# Patient Record
Sex: FEMALE | Race: WHITE | Employment: OTHER | URBAN - METROPOLITAN AREA
[De-identification: names, ages, dates, MRNs, and addresses within clinical notes are randomized per-mention and may not be internally consistent; named-entity substitution may affect disease eponyms.]

---

## 2017-03-07 ENCOUNTER — OFFICE VISIT (OUTPATIENT)
Dept: FAMILY MEDICINE CLINIC | Age: 77
End: 2017-03-07

## 2017-03-07 VITALS
HEART RATE: 81 BPM | OXYGEN SATURATION: 95 % | SYSTOLIC BLOOD PRESSURE: 132 MMHG | HEIGHT: 65 IN | BODY MASS INDEX: 29.16 KG/M2 | WEIGHT: 175 LBS | DIASTOLIC BLOOD PRESSURE: 82 MMHG

## 2017-03-07 DIAGNOSIS — E78.2 MIXED HYPERLIPIDEMIA: ICD-10-CM

## 2017-03-07 DIAGNOSIS — I10 ESSENTIAL HYPERTENSION: ICD-10-CM

## 2017-03-07 DIAGNOSIS — Z00.00 PHYSICAL EXAM, ROUTINE: Primary | ICD-10-CM

## 2017-03-07 DIAGNOSIS — Z71.2 ENCOUNTER TO DISCUSS TEST RESULTS: ICD-10-CM

## 2017-03-07 DIAGNOSIS — E03.9 HYPOTHYROIDISM, UNSPECIFIED TYPE: ICD-10-CM

## 2017-03-07 DIAGNOSIS — L98.9 SKIN ABNORMALITIES: ICD-10-CM

## 2017-03-07 PROCEDURE — G8399 PT W/DXA RESULTS DOCUMENT: HCPCS | Performed by: FAMILY MEDICINE

## 2017-03-07 PROCEDURE — 3017F COLORECTAL CA SCREEN DOC REV: CPT | Performed by: FAMILY MEDICINE

## 2017-03-07 PROCEDURE — G8598 ASA/ANTIPLAT THER USED: HCPCS | Performed by: FAMILY MEDICINE

## 2017-03-07 PROCEDURE — 4040F PNEUMOC VAC/ADMIN/RCVD: CPT | Performed by: FAMILY MEDICINE

## 2017-03-07 PROCEDURE — 1090F PRES/ABSN URINE INCON ASSESS: CPT | Performed by: FAMILY MEDICINE

## 2017-03-07 PROCEDURE — 99214 OFFICE O/P EST MOD 30 MIN: CPT | Performed by: FAMILY MEDICINE

## 2017-03-07 PROCEDURE — 1036F TOBACCO NON-USER: CPT | Performed by: FAMILY MEDICINE

## 2017-03-07 PROCEDURE — G8420 CALC BMI NORM PARAMETERS: HCPCS | Performed by: FAMILY MEDICINE

## 2017-03-07 PROCEDURE — 3014F SCREEN MAMMO DOC REV: CPT | Performed by: FAMILY MEDICINE

## 2017-03-07 PROCEDURE — 1123F ACP DISCUSS/DSCN MKR DOCD: CPT | Performed by: FAMILY MEDICINE

## 2017-03-07 PROCEDURE — G8427 DOCREV CUR MEDS BY ELIG CLIN: HCPCS | Performed by: FAMILY MEDICINE

## 2017-03-07 PROCEDURE — G8484 FLU IMMUNIZE NO ADMIN: HCPCS | Performed by: FAMILY MEDICINE

## 2017-03-07 RX ORDER — CHOLECALCIFEROL (VITAMIN D3) 125 MCG
1 CAPSULE ORAL DAILY
COMMUNITY

## 2017-07-03 RX ORDER — PRAVASTATIN SODIUM 40 MG
40 TABLET ORAL NIGHTLY
Qty: 90 TABLET | Refills: 3 | Status: SHIPPED | OUTPATIENT
Start: 2017-07-03 | End: 2017-07-07 | Stop reason: ALTCHOICE

## 2017-07-07 RX ORDER — ATORVASTATIN CALCIUM 10 MG/1
10 TABLET, FILM COATED ORAL DAILY
Qty: 90 TABLET | Refills: 3 | Status: SHIPPED | OUTPATIENT
Start: 2017-07-07 | End: 2018-05-06 | Stop reason: SDUPTHER

## 2017-11-17 ENCOUNTER — OFFICE VISIT (OUTPATIENT)
Dept: FAMILY MEDICINE CLINIC | Age: 77
End: 2017-11-17

## 2017-11-17 VITALS
HEART RATE: 99 BPM | OXYGEN SATURATION: 96 % | SYSTOLIC BLOOD PRESSURE: 132 MMHG | WEIGHT: 176.6 LBS | TEMPERATURE: 98 F | HEIGHT: 65 IN | BODY MASS INDEX: 29.42 KG/M2 | DIASTOLIC BLOOD PRESSURE: 80 MMHG

## 2017-11-17 DIAGNOSIS — J20.9 BRONCHITIS WITH BRONCHOSPASM: ICD-10-CM

## 2017-11-17 DIAGNOSIS — R06.02 SOB (SHORTNESS OF BREATH) ON EXERTION: Primary | ICD-10-CM

## 2017-11-17 PROCEDURE — 1090F PRES/ABSN URINE INCON ASSESS: CPT | Performed by: FAMILY MEDICINE

## 2017-11-17 PROCEDURE — 4040F PNEUMOC VAC/ADMIN/RCVD: CPT | Performed by: FAMILY MEDICINE

## 2017-11-17 PROCEDURE — 1036F TOBACCO NON-USER: CPT | Performed by: FAMILY MEDICINE

## 2017-11-17 PROCEDURE — 1123F ACP DISCUSS/DSCN MKR DOCD: CPT | Performed by: FAMILY MEDICINE

## 2017-11-17 PROCEDURE — 99213 OFFICE O/P EST LOW 20 MIN: CPT | Performed by: FAMILY MEDICINE

## 2017-11-17 PROCEDURE — G8484 FLU IMMUNIZE NO ADMIN: HCPCS | Performed by: FAMILY MEDICINE

## 2017-11-17 PROCEDURE — 3017F COLORECTAL CA SCREEN DOC REV: CPT | Performed by: FAMILY MEDICINE

## 2017-11-17 PROCEDURE — G8427 DOCREV CUR MEDS BY ELIG CLIN: HCPCS | Performed by: FAMILY MEDICINE

## 2017-11-17 PROCEDURE — G8399 PT W/DXA RESULTS DOCUMENT: HCPCS | Performed by: FAMILY MEDICINE

## 2017-11-17 PROCEDURE — G8419 CALC BMI OUT NRM PARAM NOF/U: HCPCS | Performed by: FAMILY MEDICINE

## 2017-11-17 RX ORDER — FLUTICASONE FUROATE AND VILANTEROL 100; 25 UG/1; UG/1
1 POWDER RESPIRATORY (INHALATION) DAILY
Qty: 1 EACH | Refills: 0 | COMMUNITY
Start: 2017-11-17 | End: 2020-02-24

## 2017-11-17 RX ORDER — DOXYCYCLINE HYCLATE 100 MG
100 TABLET ORAL 2 TIMES DAILY
Qty: 20 TABLET | Refills: 0 | Status: SHIPPED | OUTPATIENT
Start: 2017-11-17 | End: 2017-11-27

## 2017-11-17 ASSESSMENT — PATIENT HEALTH QUESTIONNAIRE - PHQ9
2. FEELING DOWN, DEPRESSED OR HOPELESS: 0
SUM OF ALL RESPONSES TO PHQ9 QUESTIONS 1 & 2: 0
1. LITTLE INTEREST OR PLEASURE IN DOING THINGS: 0
SUM OF ALL RESPONSES TO PHQ QUESTIONS 1-9: 0

## 2017-11-27 RX ORDER — LOSARTAN POTASSIUM 50 MG/1
50 TABLET ORAL DAILY
Qty: 90 TABLET | Refills: 1 | Status: SHIPPED | OUTPATIENT
Start: 2017-11-27 | End: 2018-05-06 | Stop reason: SDUPTHER

## 2017-11-27 RX ORDER — LEVOTHYROXINE SODIUM 0.07 MG/1
75 TABLET ORAL
Qty: 90 TABLET | Refills: 0 | Status: SHIPPED | OUTPATIENT
Start: 2017-11-27 | End: 2018-01-21 | Stop reason: SDUPTHER

## 2017-11-27 NOTE — TELEPHONE ENCOUNTER
Last OV  11/17/2017 SOB on exertion   Metformin  Last Refill 1/16/2017 #180 2 refills  Lab Results   Component Value Date    LABA1C 6.0 (H) 11/21/2016     Lab Results   Component Value Date    .8 12/17/2014   `    Levothyroxine  Last refill 11/26/2016 # 90 3 refils  Lab Results   Component Value Date    TSH 1.94 11/21/2016         Losartan  Last refill  11/26/2016 #90 3 refills   Lab Results   Component Value Date     11/21/2016    K 4.6 11/21/2016     11/21/2016    CO2 27 11/21/2016    BUN 24 11/21/2016    CREATININE 0.90 11/21/2016    GLUCOSE 104 (H) 11/21/2016    CALCIUM 10.1 11/21/2016    PROT 7.0 11/21/2016    LABALBU 4.4 11/21/2016    BILITOT 0.5 11/21/2016    ALKPHOS 97 11/21/2016    AST 17 11/21/2016    ALT 18 11/21/2016    LABGLOM 63 11/21/2016    GFRAA 73 11/21/2016    AGRATIO 1.7 11/21/2016    GLOB 2.6 11/21/2016

## 2017-11-27 NOTE — TELEPHONE ENCOUNTER
From: Ami Lehman  Sent: 11/26/2017 1:17 PM EST  Subject: Medication Renewal Request    Jennifer Davies. Dayana Urbina would like a refill of the following medications:  losartan (COZAAR) 50 MG tablet [Sarita Neal MD]  levothyroxine (SYNTHROID) 75 MCG tablet [Sarita Neal MD]  metFORMIN (GLUCOPHAGE) 500 MG tablet Johanna Cruz MD]    Preferred pharmacy: [address] [place] [address] [phone]    Comment:  Please request refill renewals for above Rxs (all 90 day) from [facility] asap.  Thanks

## 2018-01-03 ENCOUNTER — OFFICE VISIT (OUTPATIENT)
Dept: FAMILY MEDICINE CLINIC | Age: 78
End: 2018-01-03

## 2018-01-03 VITALS
SYSTOLIC BLOOD PRESSURE: 148 MMHG | WEIGHT: 177.2 LBS | HEIGHT: 65 IN | DIASTOLIC BLOOD PRESSURE: 92 MMHG | OXYGEN SATURATION: 98 % | BODY MASS INDEX: 29.52 KG/M2 | HEART RATE: 80 BPM

## 2018-01-03 DIAGNOSIS — I10 ESSENTIAL HYPERTENSION: Primary | ICD-10-CM

## 2018-01-03 DIAGNOSIS — E78.49 OTHER HYPERLIPIDEMIA: ICD-10-CM

## 2018-01-03 DIAGNOSIS — E03.9 ACQUIRED HYPOTHYROIDISM: ICD-10-CM

## 2018-01-03 DIAGNOSIS — J20.9 BRONCHITIS WITH BRONCHOSPASM: ICD-10-CM

## 2018-01-03 DIAGNOSIS — R73.03 PREDIABETES: ICD-10-CM

## 2018-01-03 PROCEDURE — 3017F COLORECTAL CA SCREEN DOC REV: CPT | Performed by: FAMILY MEDICINE

## 2018-01-03 PROCEDURE — G8484 FLU IMMUNIZE NO ADMIN: HCPCS | Performed by: FAMILY MEDICINE

## 2018-01-03 PROCEDURE — G8419 CALC BMI OUT NRM PARAM NOF/U: HCPCS | Performed by: FAMILY MEDICINE

## 2018-01-03 PROCEDURE — 99214 OFFICE O/P EST MOD 30 MIN: CPT | Performed by: FAMILY MEDICINE

## 2018-01-03 PROCEDURE — 4040F PNEUMOC VAC/ADMIN/RCVD: CPT | Performed by: FAMILY MEDICINE

## 2018-01-03 PROCEDURE — 1090F PRES/ABSN URINE INCON ASSESS: CPT | Performed by: FAMILY MEDICINE

## 2018-01-03 PROCEDURE — G8427 DOCREV CUR MEDS BY ELIG CLIN: HCPCS | Performed by: FAMILY MEDICINE

## 2018-01-03 PROCEDURE — 1036F TOBACCO NON-USER: CPT | Performed by: FAMILY MEDICINE

## 2018-01-03 PROCEDURE — G8399 PT W/DXA RESULTS DOCUMENT: HCPCS | Performed by: FAMILY MEDICINE

## 2018-01-03 PROCEDURE — 1123F ACP DISCUSS/DSCN MKR DOCD: CPT | Performed by: FAMILY MEDICINE

## 2018-01-03 NOTE — PROGRESS NOTES
po daily, will recheck BP on future visit  - Labs to be drawn on future visit  - CBC; Future  - Comprehensive Metabolic Panel; Future    2. Acquired hypothyroidism  - Continue Synthroid 75 mcg PO daily  - Labs to be drawn on future visit  - T4, Free; Future  - TSH without Reflex; Future    3. Other hyperlipidemia  - Continue Lipitor 10 mg po daily  - Will draw lipid panel on future visit    4. Prediabetes  - Continue metformin 500 mg po daily  - Hemoglobin A1C; Future  - Lipid Panel; Future    5. Bronchitis with bronchospasm  - Likely viral etiology  - Mild severity lack of symptoms such as fever or drainage to indicate bacterial infection  - CBC; Future  - mometasone-formoterol (DULERA) 100-5 MCG/ACT inhaler; Inhale 2 puffs into the lungs 2 times daily  Dispense: 1 Inhaler; Refill: 0      25 minutes spent with the pt face-to-face,  >50% spent reviewing test results and discussing plan of care and counseled on healthy diet, regular exercise, fasting blood work when she is feeling better and routine care in a month or 2.

## 2018-01-08 RX ORDER — PREDNISONE 20 MG/1
20 TABLET ORAL DAILY
Qty: 5 TABLET | Refills: 0 | Status: SHIPPED | OUTPATIENT
Start: 2018-01-08 | End: 2018-01-13

## 2018-01-22 RX ORDER — LEVOTHYROXINE SODIUM 0.07 MG/1
TABLET ORAL
Qty: 90 TABLET | Refills: 1 | Status: SHIPPED | OUTPATIENT
Start: 2018-01-22 | End: 2018-07-15 | Stop reason: SDUPTHER

## 2018-01-25 LAB
A/G RATIO: 1.6 (CALC) (ref 1–2.5)
ALBUMIN SERPL-MCNC: 4 G/DL (ref 3.6–5.1)
ALP BLD-CCNC: 100 U/L (ref 33–130)
ALT SERPL-CCNC: 13 U/L (ref 6–29)
AST SERPL-CCNC: 15 U/L (ref 10–35)
ATYPICAL LYMPHOCYTE RELATIVE PERCENT: NORMAL % (ref 0–10)
BAND NEUTROPHILS: NORMAL %
BANDED NEUTROPHILS ABSOLUTE COUNT: NORMAL CELLS/UL (ref 0–750)
BASOPHILS ABSOLUTE: 19 CELLS/UL (ref 0–200)
BASOPHILS RELATIVE PERCENT: 0.3 %
BILIRUB SERPL-MCNC: 0.4 MG/DL (ref 0.2–1.2)
BLASTS ABSOLUTE COUNT: NORMAL CELLS/UL
BLASTS RELATIVE PERCENT: NORMAL %
BUN / CREAT RATIO: 29 (CALC) (ref 6–22)
BUN BLDV-MCNC: 27 MG/DL (ref 7–25)
CALCIUM SERPL-MCNC: 9.7 MG/DL (ref 8.6–10.4)
CHLORIDE BLD-SCNC: 106 MMOL/L (ref 98–110)
CHOLESTEROL, TOTAL: 167 MG/DL
CHOLESTEROL/HDL RATIO: 2.6 (CALC)
CHOLESTEROL: 103 MG/DL (CALC)
CO2: 27 MMOL/L (ref 20–31)
COMMENT: NORMAL
CREAT SERPL-MCNC: 0.92 MG/DL (ref 0.6–0.93)
EOSINOPHILS ABSOLUTE: 130 CELLS/UL (ref 15–500)
EOSINOPHILS RELATIVE PERCENT: 2.1 %
GFR AFRICAN AMERICAN: 71 ML/MIN/1.73M2
GFR SERPL CREATININE-BSD FRML MDRD: 61 ML/MIN/1.73M2
GLOBULIN: 2.5 G/DL (CALC) (ref 1.9–3.7)
GLUCOSE BLD-MCNC: 87 MG/DL (ref 65–99)
HBA1C MFR BLD: 5.7 % OF TOTAL HGB
HCT VFR BLD CALC: 38.4 % (ref 35–45)
HDLC SERPL-MCNC: 64 MG/DL
HEMOGLOBIN: 13.1 G/DL (ref 11.7–15.5)
LDL CHOLESTEROL CALCULATED: 83 MG/DL (CALC)
LYMPHOCYTES ABSOLUTE: 1941 CELLS/UL (ref 850–3900)
LYMPHOCYTES RELATIVE PERCENT: 31.3 %
MCH RBC QN AUTO: 30.3 PG (ref 27–33)
MCHC RBC AUTO-ENTMCNC: 34.1 G/DL (ref 32–36)
MCV RBC AUTO: 88.7 FL (ref 80–100)
METAMYELOCYTES ABSOLUTE COUNT: NORMAL CELLS/UL
METAMYELOCYTES RELATIVE PERCENT: NORMAL %
MONOCYTES ABSOLUTE: 558 CELLS/UL (ref 200–950)
MONOCYTES RELATIVE PERCENT: 9 %
MYELOCYTE PERCENT: NORMAL %
MYELOCYTES ABSOLUTE COUNT: NORMAL CELLS/UL
NEUTROPHILS ABSOLUTE: 3553 CELLS/UL (ref 1500–7800)
NUCLEATED RED BLOOD CELLS: NORMAL /100 WBC
NUCLEATED RED BLOOD CELLS: NORMAL CELLS/UL
PDW BLD-RTO: 13.8 % (ref 11–15)
PLATELET # BLD: 287 THOUSAND/UL (ref 140–400)
PMV BLD AUTO: 9.1 FL (ref 7.5–12.5)
POTASSIUM SERPL-SCNC: 4.4 MMOL/L (ref 3.5–5.3)
PROMYELOCYTES ABSOLUTE COUNT: NORMAL CELLS/UL
PROMYELOCYTES PERCENT: NORMAL %
RBC # BLD: 4.33 MILLION/UL (ref 3.8–5.1)
SEGMENTED NEUTROPHILS RELATIVE PERCENT: 57.3 %
SODIUM BLD-SCNC: 140 MMOL/L (ref 135–146)
T4 FREE: 1.3 NG/DL (ref 0.8–1.8)
TOTAL PROTEIN: 6.5 G/DL (ref 6.1–8.1)
TRIGL SERPL-MCNC: 100 MG/DL
TSH ULTRASENSITIVE: 3.18 MIU/L (ref 0.4–4.5)
WBC # BLD: 6.2 THOUSAND/UL (ref 3.8–10.8)

## 2018-05-07 RX ORDER — LOSARTAN POTASSIUM 50 MG/1
50 TABLET ORAL DAILY
Qty: 90 TABLET | Refills: 3 | Status: SHIPPED | OUTPATIENT
Start: 2018-05-07 | End: 2019-02-18 | Stop reason: SDUPTHER

## 2018-05-07 RX ORDER — ATORVASTATIN CALCIUM 10 MG/1
10 TABLET, FILM COATED ORAL DAILY
Qty: 90 TABLET | Refills: 3 | Status: SHIPPED | OUTPATIENT
Start: 2018-05-07 | End: 2019-02-04 | Stop reason: SDUPTHER

## 2018-07-16 RX ORDER — LEVOTHYROXINE SODIUM 0.07 MG/1
TABLET ORAL
Qty: 90 TABLET | Refills: 2 | Status: SHIPPED | OUTPATIENT
Start: 2018-07-16 | End: 2019-02-04 | Stop reason: SDUPTHER

## 2018-10-17 ENCOUNTER — HOSPITAL ENCOUNTER (OUTPATIENT)
Dept: WOMENS IMAGING | Age: 78
Discharge: HOME | End: 2018-10-17
Payer: MEDICARE

## 2018-10-17 DIAGNOSIS — Z12.31 ENCOUNTER FOR SCREENING MAMMOGRAM FOR BREAST CANCER: ICD-10-CM

## 2018-10-17 PROCEDURE — 77063 BREAST TOMOSYNTHESIS BI: CPT

## 2019-02-04 DIAGNOSIS — E78.2 MIXED HYPERLIPIDEMIA: ICD-10-CM

## 2019-02-04 DIAGNOSIS — R73.03 PREDIABETES: Primary | ICD-10-CM

## 2019-02-04 DIAGNOSIS — E03.9 ACQUIRED HYPOTHYROIDISM: ICD-10-CM

## 2019-02-13 LAB
A/G RATIO: 1.8 (CALC) (ref 1–2.5)
ALBUMIN SERPL-MCNC: 4.4 G/DL (ref 3.6–5.1)
ALP BLD-CCNC: 98 U/L (ref 33–130)
ALT SERPL-CCNC: 15 U/L (ref 6–29)
AST SERPL-CCNC: 16 U/L (ref 10–35)
BILIRUB SERPL-MCNC: 0.5 MG/DL (ref 0.2–1.2)
BUN / CREAT RATIO: NORMAL (CALC) (ref 6–22)
BUN BLDV-MCNC: 24 MG/DL (ref 7–25)
CALCIUM SERPL-MCNC: 10 MG/DL (ref 8.6–10.4)
CHLORIDE BLD-SCNC: 105 MMOL/L (ref 98–110)
CHOLESTEROL, TOTAL: 176 MG/DL
CHOLESTEROL/HDL RATIO: 2.6 (CALC)
CHOLESTEROL: 108 MG/DL (CALC)
CO2: 28 MMOL/L (ref 20–32)
CREAT SERPL-MCNC: 0.84 MG/DL (ref 0.6–0.93)
GFR AFRICAN AMERICAN: 78 ML/MIN/1.73M2
GFR, ESTIMATED: 68 ML/MIN/1.73M2
GLOBULIN: 2.5 G/DL (CALC) (ref 1.9–3.7)
GLUCOSE BLD-MCNC: 91 MG/DL (ref 65–99)
HBA1C MFR BLD: 6.2 % OF TOTAL HGB
HDLC SERPL-MCNC: 68 MG/DL
LDL CHOLESTEROL CALCULATED: 89 MG/DL (CALC)
POTASSIUM SERPL-SCNC: 4.5 MMOL/L (ref 3.5–5.3)
SODIUM BLD-SCNC: 142 MMOL/L (ref 135–146)
T4 FREE: 1.2 NG/DL (ref 0.8–1.8)
TOTAL PROTEIN: 6.9 G/DL (ref 6.1–8.1)
TRIGL SERPL-MCNC: 95 MG/DL
TSH ULTRASENSITIVE: 2.47 MIU/L (ref 0.4–4.5)

## 2019-02-18 ENCOUNTER — OFFICE VISIT (OUTPATIENT)
Dept: FAMILY MEDICINE CLINIC | Age: 79
End: 2019-02-18
Payer: MEDICARE

## 2019-02-18 VITALS
WEIGHT: 187 LBS | SYSTOLIC BLOOD PRESSURE: 140 MMHG | DIASTOLIC BLOOD PRESSURE: 82 MMHG | OXYGEN SATURATION: 96 % | HEART RATE: 92 BPM | HEIGHT: 64 IN | BODY MASS INDEX: 31.92 KG/M2

## 2019-02-18 DIAGNOSIS — I10 ESSENTIAL HYPERTENSION: Primary | ICD-10-CM

## 2019-02-18 DIAGNOSIS — Z71.2 ENCOUNTER TO DISCUSS TEST RESULTS: ICD-10-CM

## 2019-02-18 DIAGNOSIS — Z76.0 ENCOUNTER FOR MEDICATION REFILL: ICD-10-CM

## 2019-02-18 DIAGNOSIS — R73.03 PREDIABETES: ICD-10-CM

## 2019-02-18 DIAGNOSIS — E03.9 ACQUIRED HYPOTHYROIDISM: ICD-10-CM

## 2019-02-18 DIAGNOSIS — Z23 NEED FOR PNEUMOCOCCAL VACCINATION: ICD-10-CM

## 2019-02-18 PROCEDURE — 1123F ACP DISCUSS/DSCN MKR DOCD: CPT | Performed by: FAMILY MEDICINE

## 2019-02-18 PROCEDURE — 1101F PT FALLS ASSESS-DOCD LE1/YR: CPT | Performed by: FAMILY MEDICINE

## 2019-02-18 PROCEDURE — 99214 OFFICE O/P EST MOD 30 MIN: CPT | Performed by: FAMILY MEDICINE

## 2019-02-18 PROCEDURE — G8417 CALC BMI ABV UP PARAM F/U: HCPCS | Performed by: FAMILY MEDICINE

## 2019-02-18 PROCEDURE — 1090F PRES/ABSN URINE INCON ASSESS: CPT | Performed by: FAMILY MEDICINE

## 2019-02-18 PROCEDURE — 90732 PPSV23 VACC 2 YRS+ SUBQ/IM: CPT | Performed by: FAMILY MEDICINE

## 2019-02-18 PROCEDURE — G8482 FLU IMMUNIZE ORDER/ADMIN: HCPCS | Performed by: FAMILY MEDICINE

## 2019-02-18 PROCEDURE — G8510 SCR DEP NEG, NO PLAN REQD: HCPCS | Performed by: FAMILY MEDICINE

## 2019-02-18 PROCEDURE — G8427 DOCREV CUR MEDS BY ELIG CLIN: HCPCS | Performed by: FAMILY MEDICINE

## 2019-02-18 PROCEDURE — G0009 ADMIN PNEUMOCOCCAL VACCINE: HCPCS | Performed by: FAMILY MEDICINE

## 2019-02-18 PROCEDURE — 1036F TOBACCO NON-USER: CPT | Performed by: FAMILY MEDICINE

## 2019-02-18 PROCEDURE — G8399 PT W/DXA RESULTS DOCUMENT: HCPCS | Performed by: FAMILY MEDICINE

## 2019-02-18 PROCEDURE — 3288F FALL RISK ASSESSMENT DOCD: CPT | Performed by: FAMILY MEDICINE

## 2019-02-18 PROCEDURE — 4040F PNEUMOC VAC/ADMIN/RCVD: CPT | Performed by: FAMILY MEDICINE

## 2019-02-18 RX ORDER — LOSARTAN POTASSIUM 50 MG/1
50 TABLET ORAL DAILY
Qty: 90 TABLET | Refills: 3 | Status: SHIPPED | OUTPATIENT
Start: 2019-02-18 | End: 2020-01-02 | Stop reason: SDUPTHER

## 2019-02-18 ASSESSMENT — PATIENT HEALTH QUESTIONNAIRE - PHQ9
SUM OF ALL RESPONSES TO PHQ QUESTIONS 1-9: 0
SUM OF ALL RESPONSES TO PHQ9 QUESTIONS 1 & 2: 0
2. FEELING DOWN, DEPRESSED OR HOPELESS: 0
1. LITTLE INTEREST OR PLEASURE IN DOING THINGS: 0
SUM OF ALL RESPONSES TO PHQ QUESTIONS 1-9: 0

## 2019-08-27 ENCOUNTER — OFFICE VISIT (OUTPATIENT)
Dept: FAMILY MEDICINE CLINIC | Age: 79
End: 2019-08-27
Payer: MEDICARE

## 2019-08-27 VITALS
DIASTOLIC BLOOD PRESSURE: 82 MMHG | WEIGHT: 179 LBS | BODY MASS INDEX: 30.26 KG/M2 | HEART RATE: 78 BPM | OXYGEN SATURATION: 97 % | SYSTOLIC BLOOD PRESSURE: 132 MMHG

## 2019-08-27 DIAGNOSIS — R53.1 GENERALIZED WEAKNESS: ICD-10-CM

## 2019-08-27 DIAGNOSIS — R10.10 UPPER ABDOMINAL PAIN OF UNKNOWN ETIOLOGY: ICD-10-CM

## 2019-08-27 DIAGNOSIS — R73.03 PREDIABETES: ICD-10-CM

## 2019-08-27 DIAGNOSIS — R15.9 INCONTINENCE OF FECES, UNSPECIFIED FECAL INCONTINENCE TYPE: Primary | ICD-10-CM

## 2019-08-27 LAB — HBA1C MFR BLD: 6.2 %

## 2019-08-27 PROCEDURE — 83036 HEMOGLOBIN GLYCOSYLATED A1C: CPT | Performed by: FAMILY MEDICINE

## 2019-08-27 PROCEDURE — 4040F PNEUMOC VAC/ADMIN/RCVD: CPT | Performed by: FAMILY MEDICINE

## 2019-08-27 PROCEDURE — 99214 OFFICE O/P EST MOD 30 MIN: CPT | Performed by: FAMILY MEDICINE

## 2019-08-27 PROCEDURE — G8427 DOCREV CUR MEDS BY ELIG CLIN: HCPCS | Performed by: FAMILY MEDICINE

## 2019-08-27 PROCEDURE — G8399 PT W/DXA RESULTS DOCUMENT: HCPCS | Performed by: FAMILY MEDICINE

## 2019-08-27 PROCEDURE — 1090F PRES/ABSN URINE INCON ASSESS: CPT | Performed by: FAMILY MEDICINE

## 2019-08-27 PROCEDURE — 1123F ACP DISCUSS/DSCN MKR DOCD: CPT | Performed by: FAMILY MEDICINE

## 2019-08-27 PROCEDURE — G8417 CALC BMI ABV UP PARAM F/U: HCPCS | Performed by: FAMILY MEDICINE

## 2019-08-27 PROCEDURE — 1036F TOBACCO NON-USER: CPT | Performed by: FAMILY MEDICINE

## 2019-08-27 RX ORDER — METFORMIN HYDROCHLORIDE 500 MG/1
1000 TABLET, EXTENDED RELEASE ORAL
Qty: 60 TABLET | Refills: 5 | Status: SHIPPED | OUTPATIENT
Start: 2019-08-27 | End: 2019-12-23

## 2019-08-27 NOTE — PATIENT INSTRUCTIONS
[place] [facility]  Multiple locations  [facility] [phone]  Fax  [phone]    [facility]  Phone  [phone]  Fax       [phone]    [facility]  Phone  [phone]  Fax       [phone]    [employer]  [phone]  Fax      [phone]

## 2019-08-27 NOTE — PROGRESS NOTES
times daily Yes Kirstie Figueroa MD   fluticasone-vilanterol (BREO ELLIPTA) 100-25 MCG/INH AEPB inhaler Inhale 1 puff into the lungs daily Yes Kirstie Figueroa MD   Coenzyme Q-10 100 MG CAPS Take 1 capsule by mouth Daily Yes Historical Provider, MD   calcium citrate-vitamin D (CITRICAL + D) 315-250 MG-UNIT TABS Take 2 tablets by mouth. Yes Historical Provider, MD   aspirin 81 MG tablet Take 81 mg by mouth daily. Yes Historical Provider, MD       Past Medical History:   Diagnosis Date    CAD (coronary artery disease)     Hypercholesterolemia     Hypertension     Pericarditis     history of    Vertigo        Social History     Tobacco Use    Smoking status: Former Smoker     Packs/day: 1.00     Years: 4.00     Pack years: 4.00     Types: Cigarettes     Last attempt to quit: 1965     Years since quittin.2    Smokeless tobacco: Never Used    Tobacco comment: stopped using cig many years ago late teens early 19's   Substance Use Topics    Alcohol use: Yes     Comment: seldom       History reviewed. No pertinent family history. OBJECTIVE:  /82   Pulse 78   Wt 179 lb (81.2 kg)   SpO2 97%   BMI 30.26 kg/m²   GEN:  in NAD  HEENT:  NCAT, TMs:normal and throat: clear  NECK:  Supple without adenopathy. CV:  Regular rate and rhythm, S1 and S2 normal, no murmurs, clicks  PULM:  Chest is clear, no wheezing ,  symmetric air entry throughout both lung fields. ABD: Soft, NT, no masses appreciated  EXT: No rash or edema  NEURO: Alert oriented ×3, no assistive device    ASSESSMENT/PLAN:  1. Incontinence of feces, unspecified fecal incontinence type  Trial of extended release metformin or no metformin at all  And monitor symptoms  Refer if symptoms persist  - Nikole Palmer MD, Colorectal Surgery, [place]    2. Prediabetes  A1c 6.2  - POCT glycosylated hemoglobin (Hb A1C)    3. Generalized weakness  Refer to PT  - External Referral To Physical Therapy    4.  Upper abdominal pain of unknown

## 2019-09-05 ENCOUNTER — HOSPITAL ENCOUNTER (OUTPATIENT)
Dept: ULTRASOUND IMAGING | Age: 79
Discharge: HOME | End: 2019-09-05
Payer: MEDICARE

## 2019-09-05 DIAGNOSIS — R10.10 UPPER ABDOMINAL PAIN OF UNKNOWN ETIOLOGY: ICD-10-CM

## 2019-09-05 PROCEDURE — 76700 US EXAM ABDOM COMPLETE: CPT

## 2019-10-01 RX ORDER — LEVOTHYROXINE SODIUM 0.07 MG/1
TABLET ORAL
Qty: 90 TABLET | Refills: 1 | Status: SHIPPED | OUTPATIENT
Start: 2019-10-01 | End: 2020-01-02 | Stop reason: SDUPTHER

## 2019-12-23 RX ORDER — METFORMIN HYDROCHLORIDE 500 MG/1
TABLET, EXTENDED RELEASE ORAL
Qty: 180 TABLET | Refills: 2 | Status: SHIPPED | OUTPATIENT
Start: 2019-12-23 | End: 2020-04-17 | Stop reason: SDUPTHER

## 2019-12-23 RX ORDER — ATORVASTATIN CALCIUM 10 MG/1
TABLET, FILM COATED ORAL
Qty: 90 TABLET | Refills: 2 | Status: SHIPPED | OUTPATIENT
Start: 2019-12-23 | End: 2020-04-17 | Stop reason: SDUPTHER

## 2020-01-02 ENCOUNTER — PATIENT MESSAGE (OUTPATIENT)
Dept: FAMILY MEDICINE CLINIC | Age: 80
End: 2020-01-02

## 2020-01-02 RX ORDER — LOSARTAN POTASSIUM 50 MG/1
50 TABLET ORAL DAILY
Qty: 90 TABLET | Refills: 0 | Status: SHIPPED | OUTPATIENT
Start: 2020-01-02 | End: 2020-02-24 | Stop reason: SDUPTHER

## 2020-01-02 RX ORDER — LEVOTHYROXINE SODIUM 0.07 MG/1
TABLET ORAL
Qty: 90 TABLET | Refills: 0 | Status: SHIPPED | OUTPATIENT
Start: 2020-01-02 | End: 2020-02-24 | Stop reason: SDUPTHER

## 2020-01-02 NOTE — TELEPHONE ENCOUNTER
Medication:   Requested Prescriptions     Pending Prescriptions Disp Refills    losartan (COZAAR) 50 MG tablet 90 tablet 3     Sig: Take 1 tablet by mouth daily    levothyroxine (SYNTHROID) 75 MCG tablet 90 tablet 1       Last Filled:    Cozaar 2/18/19 #90, 3 RF  Synthroid 10/1/19 #90, 1 RF       Patient Phone Number: [phone] (home)     Last appt: 8/27/2019 incontinence  Next appt: Visit date not found     Lab Results   Component Value Date     02/12/2019    K 4.5 02/12/2019     02/12/2019    CO2 28 02/12/2019    BUN 24 02/12/2019    CREATININE 0.84 02/12/2019    GLUCOSE 91 02/12/2019    CALCIUM 10.0 02/12/2019    PROT 6.9 02/12/2019    LABALBU 4.4 02/12/2019    BILITOT 0.5 02/12/2019    ALKPHOS 98 02/12/2019    AST 16 02/12/2019    ALT 15 02/12/2019    LABGLOM 61 01/24/2018    GFRAA 78 02/12/2019    AGRATIO 1.8 02/12/2019    GLOB 2.5 02/12/2019       Lab Results   Component Value Date    TSH 2.47 02/12/2019

## 2020-01-03 NOTE — TELEPHONE ENCOUNTER
I have called and spoke with pt and medications were sent to [facility] the mail order pharmacy. Pt had refills on her scripts from local [facility] and the mail order would not fill till resolve. Pt said all has been taking care of.

## 2020-02-03 ENCOUNTER — TELEPHONE (OUTPATIENT)
Dept: FAMILY MEDICINE CLINIC | Age: 80
End: 2020-02-03

## 2020-02-24 ENCOUNTER — OFFICE VISIT (OUTPATIENT)
Dept: FAMILY MEDICINE CLINIC | Age: 80
End: 2020-02-24
Payer: MEDICARE

## 2020-02-24 VITALS
BODY MASS INDEX: 30.56 KG/M2 | WEIGHT: 179 LBS | OXYGEN SATURATION: 97 % | DIASTOLIC BLOOD PRESSURE: 78 MMHG | HEIGHT: 64 IN | HEART RATE: 88 BPM | SYSTOLIC BLOOD PRESSURE: 114 MMHG

## 2020-02-24 PROCEDURE — G8482 FLU IMMUNIZE ORDER/ADMIN: HCPCS | Performed by: FAMILY MEDICINE

## 2020-02-24 PROCEDURE — G0439 PPPS, SUBSEQ VISIT: HCPCS | Performed by: FAMILY MEDICINE

## 2020-02-24 PROCEDURE — 1123F ACP DISCUSS/DSCN MKR DOCD: CPT | Performed by: FAMILY MEDICINE

## 2020-02-24 PROCEDURE — 4040F PNEUMOC VAC/ADMIN/RCVD: CPT | Performed by: FAMILY MEDICINE

## 2020-02-24 RX ORDER — LEVOTHYROXINE SODIUM 0.07 MG/1
TABLET ORAL
Qty: 90 TABLET | Refills: 3 | Status: SHIPPED | OUTPATIENT
Start: 2020-02-24 | End: 2020-11-20

## 2020-02-24 RX ORDER — LOSARTAN POTASSIUM 50 MG/1
50 TABLET ORAL DAILY
Qty: 90 TABLET | Refills: 3 | Status: SHIPPED | OUTPATIENT
Start: 2020-02-24 | End: 2020-11-20

## 2020-02-24 ASSESSMENT — LIFESTYLE VARIABLES
HOW OFTEN DO YOU HAVE A DRINK CONTAINING ALCOHOL: 1
HOW OFTEN DURING THE LAST YEAR HAVE YOU FOUND THAT YOU WERE NOT ABLE TO STOP DRINKING ONCE YOU HAD STARTED: 0
HAS A RELATIVE, FRIEND, DOCTOR, OR ANOTHER HEALTH PROFESSIONAL EXPRESSED CONCERN ABOUT YOUR DRINKING OR SUGGESTED YOU CUT DOWN: 0
HOW OFTEN DURING THE LAST YEAR HAVE YOU FAILED TO DO WHAT WAS NORMALLY EXPECTED FROM YOU BECAUSE OF DRINKING: 0
HOW OFTEN DURING THE LAST YEAR HAVE YOU NEEDED AN ALCOHOLIC DRINK FIRST THING IN THE MORNING TO GET YOURSELF GOING AFTER A NIGHT OF HEAVY DRINKING: 0
HOW OFTEN DO YOU HAVE SIX OR MORE DRINKS ON ONE OCCASION: 0
HAVE YOU OR SOMEONE ELSE BEEN INJURED AS A RESULT OF YOUR DRINKING: 0
AUDIT TOTAL SCORE: 1
HOW MANY STANDARD DRINKS CONTAINING ALCOHOL DO YOU HAVE ON A TYPICAL DAY: 0
AUDIT-C TOTAL SCORE: 1
HOW OFTEN DURING THE LAST YEAR HAVE YOU HAD A FEELING OF GUILT OR REMORSE AFTER DRINKING: 0
HOW OFTEN DURING THE LAST YEAR HAVE YOU BEEN UNABLE TO REMEMBER WHAT HAPPENED THE NIGHT BEFORE BECAUSE YOU HAD BEEN DRINKING: 0

## 2020-02-24 ASSESSMENT — PATIENT HEALTH QUESTIONNAIRE - PHQ9
SUM OF ALL RESPONSES TO PHQ QUESTIONS 1-9: 0
SUM OF ALL RESPONSES TO PHQ QUESTIONS 1-9: 0

## 2020-02-24 NOTE — PROGRESS NOTES
Medicare Annual Wellness Visit  Name: Major Somersing Date: 2020   MRN: [id number] Sex: Female   Age: 70 y.o. Ethnicity: Non-/Non    : 1940 Race: Janae Robles is here for Medicare AWV    Screenings for behavioral, psychosocial and functional/safety risks, and cognitive dysfunction are all negative except as indicated below. These results, as well as other patient data from the [address] form, are documented in Flowsheets linked to this Encounter. Overall doing very well  Painting is her hobby lately    Allergies   Allergen Reactions    Ceftin [Cefuroxime Axetil]      Diarrhea         Prior to Visit Medications    Medication Sig Taking? Authorizing Provider   losartan (COZAAR) 50 MG tablet Take 1 tablet by mouth daily Yes Alfred Eason MD   levothyroxine (SYNTHROID) 75 MCG tablet TAKE 1 TABLET BY MOUTH EVERY MORNING BEFORE BREAKFAST Yes Alfred Eason MD   atorvastatin (LIPITOR) 10 MG tablet TAKE 1 TABLET BY MOUTH EVERY DAY Yes Alfred Eason MD   metFORMIN (GLUCOPHAGE-XR) 500 MG extended release tablet TAKE 2 TABLETS BY MOUTH EVERY DAY WITH SUPPER Yes Alfred Eason MD   Coenzyme Q-10 100 MG CAPS Take 1 capsule by mouth Daily Yes Historical Provider, MD   calcium citrate-vitamin D (CITRICAL + D) 315-250 MG-UNIT TABS Take 2 tablets by mouth. Yes Historical Provider, MD   aspirin 81 MG tablet Take 81 mg by mouth daily.    Yes Historical Provider, MD         Past Medical History:   Diagnosis Date    CAD (coronary artery disease)     Hypercholesterolemia     Hypertension     Pericarditis     history of    Vertigo        Past Surgical History:   Procedure Laterality Date    CATARACT REMOVAL Left 2012    FRACTURE SURGERY      repair    TONSILLECTOMY           Family History   Problem Relation Age of Onset    Dementia Mother     Pancreatic Cancer Father        CareTeam (Including outside providers/suppliers regularly involved in providing in [facility]. The following problems were reviewed today and where indicated follow up appointments were made and/or referrals ordered. Positive Risk Factor Screenings with Interventions:     Health Habits/Nutrition:  Health Habits/Nutrition  Do you exercise for at least 20 minutes 2-3 times per week?: (!) No  Have you lost any weight without trying in the past 3 months?: No  Do you eat fewer than 2 meals per day?: No  Have you seen a dentist within the past year?: Yes  Body mass index is 30.26 kg/m².   Health Habits/Nutrition Interventions:  · Healthy diet, regular exercise    Hearing/Vision:  No exam data present  Hearing/Vision  Do you or your family notice any trouble with your hearing?: (!) Yes  Do you have difficulty driving, watching TV, or doing any of your daily activities because of your eyesight?: No  Have you had an eye exam within the past year?: Yes  Hearing/Vision Interventions:  · Offer hearing screening when ready    Personalized Preventive Plan   Current Health Maintenance Status  Immunization History   Administered Date(s) Administered    Influenza Virus Vaccine 10/22/2014, 10/22/2014    Influenza, High Dose (Fluzone 65 yrs and older) 12/11/2015, 10/28/2016, 10/28/2016, 10/25/2017, 10/20/2018, 10/06/2019    Pneumococcal Conjugate 13-valent ([id number]) 02/13/2015    Pneumococcal Polysaccharide ([id number]) 02/18/2019    Tdap (Boostrix, Adacel) 07/29/2014    Zoster Recombinant (Shingrix) 05/06/2019, 05/13/2019, 09/12/2019        Health Maintenance   Topic Date Due    Annual Wellness Visit (AWV)  05/29/2019    Breast cancer screen  10/17/2020    Lipid screen  02/14/2021    TSH testing  02/14/2021    Potassium monitoring  02/14/2021    Creatinine monitoring  02/14/2021    DTaP/Tdap/Td vaccine (2 - Td) 07/29/2024    DEXA (modify frequency per FRAX score)  Completed    Flu vaccine  Completed    Shingles Vaccine  Completed    Pneumococcal 65+ years Vaccine  Completed    Hepatitis A vaccine  Aged Out    Hepatitis B vaccine  Aged Out    Hib vaccine  Aged Out    Meningococcal (ACWY) vaccine  Aged Out     Recommendations for [place] Due: see orders and patient instructions/AVS.  . Recommended screening schedule for the next 5-10 years is provided to the patient in written form: see Patient Instructions/AVS.    Norma Park was seen today for medicare awv. Diagnoses and all orders for this visit:    Encounter for Medicare annual wellness exam  Healthy diet, stay active  Essential hypertension   Stable, continue medication  -     losartan (COZAAR) 50 MG tablet; Take 1 tablet by mouth daily    Acquired hypothyroidism  Stable, continue medication    Mixed hyperlipidemia  Stable, continue medication    Prediabetes  Stable, continue medication  Healthy diet and regular exercise    Breast cancer screening by mammogram  Screen every 1 to 2 years  -     IRVIN DIGITAL SCREENING AUGMENTED BILATERAL;  Future    Other orders  -     levothyroxine (SYNTHROID) 75 MCG tablet; TAKE 1 TABLET BY MOUTH EVERY MORNING BEFORE BREAKFAST    Follow-up every 6 to 12 months with Dr. Hyacinth Rucker

## 2020-02-24 NOTE — PATIENT INSTRUCTIONS
Personalized Preventive Plan for Angelica Raymundo - 2/24/2020  Medicare offers a range of preventive health benefits. Some of the tests and screenings are paid in full while other may be subject to a deductible, co-insurance, and/or copay. Some of these benefits include a comprehensive review of your medical history including lifestyle, illnesses that may run in your family, and various assessments and screenings as appropriate. After reviewing your medical record and screening and assessments performed today your provider may have ordered immunizations, labs, imaging, and/or referrals for you. A list of these orders (if applicable) as well as your Preventive Care list are included within your After Visit Summary for your review. Other Preventive Recommendations:    · A preventive eye exam performed by an eye specialist is recommended every 1-2 years to screen for glaucoma; cataracts, macular degeneration, and other eye disorders. · A preventive dental visit is recommended every 6 months. · Try to get at least 150 minutes of exercise per week or 10,000 steps per day on a pedometer . · Order or download the FREE \"Exercise & Physical Activity: Your Everyday Guide\" from [employer]. Call 3-890.914.7501 or search [employer] online. · You need 0905-9775 mg of calcium and 5444-1652 IU of vitamin D per day. It is possible to meet your calcium requirement with diet alone, but a vitamin D supplement is usually necessary to meet this goal.  · When exposed to the sun, use a sunscreen that protects against both UVA and UVB radiation with an SPF of 30 or greater. Reapply every 2 to 3 hours or after sweating, drying off with a towel, or swimming. · Always wear a seat belt when traveling in a car. Always wear a helmet when riding a bicycle or motorcycle.

## 2020-04-17 RX ORDER — ATORVASTATIN CALCIUM 10 MG/1
TABLET, FILM COATED ORAL
Qty: 90 TABLET | Refills: 2 | Status: SHIPPED | OUTPATIENT
Start: 2020-04-17 | End: 2020-11-20

## 2020-04-17 RX ORDER — METFORMIN HYDROCHLORIDE 500 MG/1
TABLET, EXTENDED RELEASE ORAL
Qty: 180 TABLET | Refills: 2 | Status: SHIPPED | OUTPATIENT
Start: 2020-04-17 | End: 2020-11-20

## 2020-04-17 NOTE — TELEPHONE ENCOUNTER
Medication and Quantity requested: atorvastatin  10mg # 90  Metformin er 500mg #180    Last Visit  02.24.2020    Pharmacy and phone number updated in [facility]:  yes    [facility] 90 day supply

## 2020-10-15 ENCOUNTER — NURSE ONLY (OUTPATIENT)
Dept: FAMILY MEDICINE CLINIC | Age: 80
End: 2020-10-15
Payer: MEDICARE

## 2020-10-15 PROCEDURE — G0008 ADMIN INFLUENZA VIRUS VAC: HCPCS | Performed by: FAMILY MEDICINE

## 2020-10-15 PROCEDURE — 90694 VACC AIIV4 NO PRSRV 0.5ML IM: CPT | Performed by: FAMILY MEDICINE

## 2020-10-29 ENCOUNTER — HOSPITAL ENCOUNTER (OUTPATIENT)
Dept: WOMENS IMAGING | Age: 80
Discharge: HOME | End: 2020-10-29
Payer: MEDICARE

## 2020-10-29 PROCEDURE — 77063 BREAST TOMOSYNTHESIS BI: CPT

## 2020-11-20 RX ORDER — METFORMIN HYDROCHLORIDE 500 MG/1
TABLET, EXTENDED RELEASE ORAL
Qty: 180 TABLET | Refills: 0 | Status: SHIPPED | OUTPATIENT
Start: 2020-11-20 | End: 2021-02-01

## 2020-11-20 RX ORDER — LEVOTHYROXINE SODIUM 0.07 MG/1
TABLET ORAL
Qty: 90 TABLET | Refills: 0 | Status: SHIPPED | OUTPATIENT
Start: 2020-11-20 | End: 2021-02-26 | Stop reason: SDUPTHER

## 2020-11-20 RX ORDER — ATORVASTATIN CALCIUM 10 MG/1
TABLET, FILM COATED ORAL
Qty: 90 TABLET | Refills: 0 | Status: SHIPPED | OUTPATIENT
Start: 2020-11-20 | End: 2021-02-01

## 2020-11-20 RX ORDER — LOSARTAN POTASSIUM 50 MG/1
TABLET ORAL
Qty: 90 TABLET | Refills: 0 | Status: SHIPPED | OUTPATIENT
Start: 2020-11-20 | End: 2021-01-12

## 2020-11-20 NOTE — TELEPHONE ENCOUNTER
Medication:   Requested Prescriptions     Pending Prescriptions Disp Refills    losartan (COZAAR) 50 MG tablet [Pharmacy Med Name: LOSARTAN TAB 50MG] 90 tablet 3     Sig: TAKE 1 TABLET DAILY    levothyroxine (SYNTHROID) 75 MCG tablet [Pharmacy Med Name: LEVOTHYROXIN TAB 0.075MG] 90 tablet 3     Sig: TAKE 1 TABLET EVERY MORNINGBEFORE BREAKFAST    atorvastatin (LIPITOR) 10 MG tablet [Pharmacy Med Name: ATORVASTATIN TAB 10MG] 90 tablet 2     Sig: TAKE 1 TABLET DAILY    metFORMIN (GLUCOPHAGE-XR) 500 MG extended release tablet [Pharmacy Med Name: METFORMIN ER TAB 500MG GP] 180 tablet 2     Sig: TAKE 2 TABLETS DAILY WITH  SUPPER       Last Filled:  Losartan 2/24/20 #90, 3 RF  Synthroid 2/24/20 #90, 3 RF  Atorvastatin 4/17/20 #90, 2 RF  Metformin 4/17/20 #180, 2 RF     Patient Phone Number: [phone] (home)     Last appt: 2/24/2020 AWV   Next appt: Visit date not found    Lab Results   Component Value Date     02/14/2020    K 4.0 02/14/2020     02/14/2020    CO2 27 02/14/2020    BUN 25 02/14/2020    CREATININE 0.76 02/14/2020    GLUCOSE 82 02/14/2020    CALCIUM 9.4 02/14/2020    PROT 6.7 02/14/2020    LABALBU 4.0 02/14/2020    BILITOT 0.4 02/14/2020    ALKPHOS 99 02/14/2020    AST 17 02/14/2020    ALT 14 02/14/2020    LABGLOM 61 01/24/2018    GFRAA 88 02/14/2020    AGRATIO 1.5 02/14/2020    GLOB 2.7 02/14/2020

## 2020-12-21 ENCOUNTER — OFFICE VISIT (OUTPATIENT)
Dept: PRIMARY CARE CLINIC | Age: 80
End: 2020-12-21
Payer: MEDICARE

## 2020-12-21 PROCEDURE — G8417 CALC BMI ABV UP PARAM F/U: HCPCS | Performed by: NURSE PRACTITIONER

## 2020-12-21 PROCEDURE — G8428 CUR MEDS NOT DOCUMENT: HCPCS | Performed by: NURSE PRACTITIONER

## 2020-12-21 PROCEDURE — 99211 OFF/OP EST MAY X REQ PHY/QHP: CPT | Performed by: NURSE PRACTITIONER

## 2020-12-21 NOTE — PROGRESS NOTES
Adali Anthony received a viral test for COVID-19. They were educated on isolation and quarantine as appropriate. For any symptoms, they were directed to seek care from their PCP, given contact information to establish with a doctor, directed to an urgent care or the emergency room.

## 2020-12-21 NOTE — PATIENT INSTRUCTIONS
You have received a viral test for COVID-19. Below is education on quarantine per the CDC guidelines. For any symptoms, seek care from your PCP, call [phone] to establish care with a doctor, or go directly to an urgent care or the emergency room. Test results will take 2-7 days and will be sent to you in your [employer] account. If you test positive, you will be contacted via phone. If you test negative, the ONLY communication will be through [address] GO TO [employer] AND SIGN UP FOR [employer]  (LOWER LEFT OF THE HOME PAGE)  No test is 100%. If you have symptoms, you should follow the guidance of quarantine as previously stated. You can still be contagious if you have symptoms. Your [facility] will reach out to you if you have a positive result. They will provide you with a return to work date and note. If you were tested for a pre-op, then you should remain in quarantine until your procedure. How do I know if I need to be in quarantine? If you live in a community where COVID-19 is or might be spreading (currently, that is virtually everywhere in the [place])  Be alert for symptoms. Watch for fever, cough, shortness of breath, or other symptoms of COVID-19.  ? Take your temperature if symptoms develop. ? Practice social distancing. Maintain 6 feet of distance from others and stay out of crowded places. ? Follow CDC guidance if symptoms develop. If you feel healthy but:  ? Recently had close contact with a person with COVID-19 you need to Quarantine:  ? Stay home until 14 days after your last exposure. ? Check your temperature twice a day and watch for symptoms of COVID-19.  ? If possible, stay away from people who are at higher-risk for getting very sick from COVID-19. Stay Home and Monitor Your Health if you:  ? Have been diagnosed with COVID-19, or  ? Are waiting for test results, or  ?  Have cough, fever, or shortness of breath, or symptoms of COVID-19 When You Can be Around Others After You Had or Likely Had COVID-19     If you have or think you might have COVID-19, it is important to stay home and away from other people. Staying away from others helps stop the spread of COVID-19. If you have an emergency warning sign (including trouble breathing), get emergency medical care immediately. When you can be around others (end home isolation) depends on different factors for different situations. Find CDC's recommendations for your situation below. I think or know I had COVID-19, and I had symptoms  You can be with others after  ? 3 days with no fever and  ? Respiratory symptoms have improved (e.g. cough, shortness of breath) and  ? 10 days since symptoms first appeared  Depending on your healthcare provider's advice and availability of testing, you might get tested to see if you still have COVID-19. If you will be tested, you can be around others when you have no fever, respiratory symptoms have improved, and you receive two negative test results in a row, at least 24 hours apart. I tested positive for COVID-19 but had no symptoms  If you continue to have no symptoms, you can be with others after:  ? 10 days have passed since test or 14 days since your exposure test   Depending on your healthcare provider's advice and availability of testing, you might get tested to see if you still have COVID-19. If you will be tested, you can be around others after you receive two negative test results in a row, at least 24 hours apart. If you develop symptoms after testing positive, follow the guidance above for I think or know I had COVID, and I had symptoms.   For Anyone Who Has Been Around a Person with COVID-19  It is important to remember that anyone who has close contact with someone with COVID-19 should stay home for 14 days after exposure based on the time it takes to develop illness. Testing is not necessary.     www.cdc.gov/coronavirus/2019-ncov/index.html

## 2020-12-22 LAB — SARS-COV-2, NAA: DETECTED

## 2021-01-12 DIAGNOSIS — I10 ESSENTIAL HYPERTENSION: ICD-10-CM

## 2021-01-12 RX ORDER — LOSARTAN POTASSIUM 50 MG/1
TABLET ORAL
Qty: 90 TABLET | Refills: 0 | Status: SHIPPED | OUTPATIENT
Start: 2021-01-12 | End: 2021-02-26 | Stop reason: SDUPTHER

## 2021-01-12 NOTE — TELEPHONE ENCOUNTER
Medication:   Requested Prescriptions     Pending Prescriptions Disp Refills    losartan (COZAAR) 50 MG tablet [Pharmacy Med Name: LOSARTAN TAB 50MG] 90 tablet 0     Sig: TAKE 1 TABLET DAILY       Last Filled: 11/20/20 #90, 0 RF      Patient Phone Number: [phone] (home)     Last appt: 2/24/2020 AWV   Next appt: Visit date not found    Lab Results   Component Value Date     02/14/2020    K 4.0 02/14/2020     02/14/2020    CO2 27 02/14/2020    BUN 25 02/14/2020    CREATININE 0.76 02/14/2020    GLUCOSE 82 02/14/2020    CALCIUM 9.4 02/14/2020    PROT 6.7 02/14/2020    LABALBU 4.0 02/14/2020    BILITOT 0.4 02/14/2020    ALKPHOS 99 02/14/2020    AST 17 02/14/2020    ALT 14 02/14/2020    LABGLOM 61 01/24/2018    GFRAA 88 02/14/2020    AGRATIO 1.5 02/14/2020    GLOB 2.7 02/14/2020

## 2021-01-14 ENCOUNTER — PATIENT MESSAGE (OUTPATIENT)
Dept: FAMILY MEDICINE CLINIC | Age: 81
End: 2021-01-14

## 2021-01-14 DIAGNOSIS — R73.03 PREDIABETES: ICD-10-CM

## 2021-01-14 DIAGNOSIS — I10 ESSENTIAL HYPERTENSION: Primary | ICD-10-CM

## 2021-01-14 DIAGNOSIS — E78.2 MIXED HYPERLIPIDEMIA: ICD-10-CM

## 2021-01-15 DIAGNOSIS — R73.03 PREDIABETES: Primary | ICD-10-CM

## 2021-01-15 NOTE — TELEPHONE ENCOUNTER
From: Darius Marrero  To: Wilda Pickens MD  Sent: 1/14/2021 5:23 PM EST  Subject: Non-Urgent Medical Question    Have appointment for annual checkup on 2/26/21. Can you send me an order for blood tests you would like me to have so we can discuss results at time of my appointment? Secondly, do you recommend covid vaccine for me even tho I tested positive for covid on Dec. 21?

## 2021-02-01 RX ORDER — METFORMIN HYDROCHLORIDE 500 MG/1
TABLET, EXTENDED RELEASE ORAL
Qty: 60 TABLET | Refills: 0 | Status: SHIPPED | OUTPATIENT
Start: 2021-02-01 | End: 2021-02-26 | Stop reason: SDUPTHER

## 2021-02-01 RX ORDER — ATORVASTATIN CALCIUM 10 MG/1
TABLET, FILM COATED ORAL
Qty: 30 TABLET | Refills: 0 | Status: SHIPPED | OUTPATIENT
Start: 2021-02-01 | End: 2021-02-26 | Stop reason: SDUPTHER

## 2021-02-01 NOTE — TELEPHONE ENCOUNTER
Medication:   Requested Prescriptions     Pending Prescriptions Disp Refills    atorvastatin (LIPITOR) 10 MG tablet [Pharmacy Med Name: ATORVASTATIN TAB 10MG] 90 tablet 0     Sig: TAKE 1 TABLET DAILY    metFORMIN (GLUCOPHAGE-XR) 500 MG extended release tablet [Pharmacy Med Name: METFORMIN ER TAB 500MG GP] 180 tablet 0     Sig: TAKE 2 TABLETS DAILY WITH  SUPPER       Last Filled:  11/20/20    Patient Phone Number: [phone] (home)     Last appt: 2/24/2020   Next appt: 2/26/2021    Last Labs DM:   Lab Results   Component Value Date    LABA1C 6.2 02/14/2020

## 2021-02-18 LAB
A/G RATIO: 1.4 (CALC) (ref 1–2.5)
ALBUMIN SERPL-MCNC: 4.2 G/DL (ref 3.6–5.1)
ALP BLD-CCNC: 100 U/L (ref 37–153)
ALT SERPL-CCNC: 15 U/L (ref 6–29)
AST SERPL-CCNC: 15 U/L (ref 10–35)
ATYPICAL LYMPHOCYTE RELATIVE PERCENT: NORMAL % (ref 0–10)
BAND NEUTROPHILS: NORMAL %
BANDED NEUTROPHILS ABSOLUTE COUNT: NORMAL CELLS/UL (ref 0–750)
BASOPHILS ABSOLUTE: 41 CELLS/UL (ref 0–200)
BASOPHILS RELATIVE PERCENT: 0.6 %
BILIRUB SERPL-MCNC: 0.6 MG/DL (ref 0.2–1.2)
BLASTS ABSOLUTE COUNT: NORMAL CELLS/UL
BLASTS RELATIVE PERCENT: NORMAL %
BUN / CREAT RATIO: NORMAL (CALC) (ref 6–22)
BUN BLDV-MCNC: 24 MG/DL (ref 7–25)
CALCIUM SERPL-MCNC: 10.1 MG/DL (ref 8.6–10.4)
CHLORIDE BLD-SCNC: 104 MMOL/L (ref 98–110)
CHOLESTEROL, TOTAL: 170 MG/DL
CHOLESTEROL/HDL RATIO: 2.7 (CALC)
CO2: 26 MMOL/L (ref 20–32)
COMMENT: NORMAL
CREAT SERPL-MCNC: 0.9 MG/DL (ref 0.6–0.93)
EOSINOPHILS ABSOLUTE: 143 CELLS/UL (ref 15–500)
EOSINOPHILS RELATIVE PERCENT: 2.1 %
GFR AFRICAN AMERICAN: 71 ML/MIN/1.73M2
GFR, ESTIMATED: 61 ML/MIN/1.73M2
GLOBULIN: 2.9 G/DL (CALC) (ref 1.9–3.7)
GLUCOSE BLD-MCNC: 92 MG/DL (ref 65–99)
HBA1C MFR BLD: 6.2 % OF TOTAL HGB
HCT VFR BLD CALC: 38.8 % (ref 35–45)
HDLC SERPL-MCNC: 63 MG/DL
HEMOGLOBIN: 13.2 G/DL (ref 11.7–15.5)
LDL CHOLESTEROL CALCULATED: 85 MG/DL (CALC)
LYMPHOCYTES ABSOLUTE: 2108 CELLS/UL (ref 850–3900)
LYMPHOCYTES RELATIVE PERCENT: 31 %
MCH RBC QN AUTO: 30.1 PG (ref 27–33)
MCHC RBC AUTO-ENTMCNC: 34 G/DL (ref 32–36)
MCV RBC AUTO: 88.4 FL (ref 80–100)
METAMYELOCYTES ABSOLUTE COUNT: NORMAL CELLS/UL
METAMYELOCYTES RELATIVE PERCENT: NORMAL %
MONOCYTES ABSOLUTE: 673 CELLS/UL (ref 200–950)
MONOCYTES RELATIVE PERCENT: 9.9 %
MYELOCYTE PERCENT: NORMAL %
MYELOCYTES ABSOLUTE COUNT: NORMAL CELLS/UL
NEUTROPHILS ABSOLUTE: 3835 CELLS/UL (ref 1500–7800)
NONHDLC SERPL-MCNC: 107 MG/DL (CALC)
NUCLEATED RED BLOOD CELLS: NORMAL /100 WBC
NUCLEATED RED BLOOD CELLS: NORMAL CELLS/UL
PDW BLD-RTO: 13.9 % (ref 11–15)
PLATELET # BLD: 332 THOUSAND/UL (ref 140–400)
PMV BLD AUTO: 9.1 FL (ref 7.5–12.5)
POTASSIUM SERPL-SCNC: 4.3 MMOL/L (ref 3.5–5.3)
PROMYELOCYTES ABSOLUTE COUNT: NORMAL CELLS/UL
PROMYELOCYTES PERCENT: NORMAL %
RBC # BLD: 4.39 MILLION/UL (ref 3.8–5.1)
SEGMENTED NEUTROPHILS RELATIVE PERCENT: 56.4 %
SODIUM BLD-SCNC: 141 MMOL/L (ref 135–146)
T4 FREE: 1.2 NG/DL (ref 0.8–1.8)
TOTAL PROTEIN: 7.1 G/DL (ref 6.1–8.1)
TRIGL SERPL-MCNC: 122 MG/DL
TSH ULTRASENSITIVE: 3.08 MIU/L (ref 0.4–4.5)
WBC # BLD: 6.8 THOUSAND/UL (ref 3.8–10.8)

## 2021-02-26 ENCOUNTER — OFFICE VISIT (OUTPATIENT)
Dept: FAMILY MEDICINE CLINIC | Age: 81
End: 2021-02-26
Payer: MEDICARE

## 2021-02-26 VITALS
OXYGEN SATURATION: 97 % | TEMPERATURE: 97.3 F | SYSTOLIC BLOOD PRESSURE: 118 MMHG | WEIGHT: 192 LBS | DIASTOLIC BLOOD PRESSURE: 78 MMHG | BODY MASS INDEX: 32.78 KG/M2 | HEIGHT: 64 IN | HEART RATE: 110 BPM

## 2021-02-26 DIAGNOSIS — Z00.00 MEDICARE ANNUAL WELLNESS VISIT, SUBSEQUENT: Primary | ICD-10-CM

## 2021-02-26 DIAGNOSIS — Z13.820 OSTEOPOROSIS SCREENING: ICD-10-CM

## 2021-02-26 DIAGNOSIS — I10 ESSENTIAL HYPERTENSION: ICD-10-CM

## 2021-02-26 PROCEDURE — G8484 FLU IMMUNIZE NO ADMIN: HCPCS | Performed by: FAMILY MEDICINE

## 2021-02-26 PROCEDURE — 1123F ACP DISCUSS/DSCN MKR DOCD: CPT | Performed by: FAMILY MEDICINE

## 2021-02-26 PROCEDURE — 4040F PNEUMOC VAC/ADMIN/RCVD: CPT | Performed by: FAMILY MEDICINE

## 2021-02-26 PROCEDURE — G0439 PPPS, SUBSEQ VISIT: HCPCS | Performed by: FAMILY MEDICINE

## 2021-02-26 RX ORDER — ATORVASTATIN CALCIUM 10 MG/1
TABLET, FILM COATED ORAL
Qty: 90 TABLET | Refills: 3 | Status: SHIPPED | OUTPATIENT
Start: 2021-02-26 | End: 2022-03-14

## 2021-02-26 RX ORDER — LOSARTAN POTASSIUM 50 MG/1
TABLET ORAL
Qty: 90 TABLET | Refills: 3 | Status: SHIPPED | OUTPATIENT
Start: 2021-02-26 | End: 2022-02-15

## 2021-02-26 RX ORDER — METFORMIN HYDROCHLORIDE 500 MG/1
TABLET, EXTENDED RELEASE ORAL
Qty: 180 TABLET | Refills: 3 | Status: SHIPPED | OUTPATIENT
Start: 2021-02-26 | End: 2022-03-14

## 2021-02-26 RX ORDER — LEVOTHYROXINE SODIUM 0.07 MG/1
TABLET ORAL
Qty: 90 TABLET | Refills: 3 | Status: SHIPPED | OUTPATIENT
Start: 2021-02-26 | End: 2022-02-08

## 2021-02-26 SDOH — ECONOMIC STABILITY: TRANSPORTATION INSECURITY
IN THE PAST 12 MONTHS, HAS LACK OF TRANSPORTATION KEPT YOU FROM MEETINGS, WORK, OR FROM GETTING THINGS NEEDED FOR DAILY LIVING?: NO

## 2021-02-26 SDOH — ECONOMIC STABILITY: FOOD INSECURITY: WITHIN THE PAST 12 MONTHS, THE FOOD YOU BOUGHT JUST DIDN'T LAST AND YOU DIDN'T HAVE MONEY TO GET MORE.: NEVER TRUE

## 2021-02-26 SDOH — ECONOMIC STABILITY: TRANSPORTATION INSECURITY
IN THE PAST 12 MONTHS, HAS THE LACK OF TRANSPORTATION KEPT YOU FROM MEDICAL APPOINTMENTS OR FROM GETTING MEDICATIONS?: NO

## 2021-02-26 ASSESSMENT — PATIENT HEALTH QUESTIONNAIRE - PHQ9
1. LITTLE INTEREST OR PLEASURE IN DOING THINGS: 0
2. FEELING DOWN, DEPRESSED OR HOPELESS: 0
SUM OF ALL RESPONSES TO PHQ9 QUESTIONS 1 & 2: 0
SUM OF ALL RESPONSES TO PHQ QUESTIONS 1-9: 0

## 2021-02-26 ASSESSMENT — LIFESTYLE VARIABLES: HOW OFTEN DO YOU HAVE A DRINK CONTAINING ALCOHOL: 1

## 2021-02-26 NOTE — PATIENT INSTRUCTIONS
Personalized Preventive Plan for Sandy Ambriz - 2/26/2021  Medicare offers a range of preventive health benefits. Some of the tests and screenings are paid in full while other may be subject to a deductible, co-insurance, and/or copay. Some of these benefits include a comprehensive review of your medical history including lifestyle, illnesses that may run in your family, and various assessments and screenings as appropriate. After reviewing your medical record and screening and assessments performed today your provider may have ordered immunizations, labs, imaging, and/or referrals for you. A list of these orders (if applicable) as well as your Preventive Care list are included within your After Visit Summary for your review. Other Preventive Recommendations:    · A preventive eye exam performed by an eye specialist is recommended every 1-2 years to screen for glaucoma; cataracts, macular degeneration, and other eye disorders. · A preventive dental visit is recommended every 6 months. · Try to get at least 150 minutes of exercise per week or 10,000 steps per day on a pedometer . · Order or download the FREE \"Exercise & Physical Activity: Your Everyday Guide\" from [employer]. Call 9-901.753.2377 or search [employer] online. · You need 3693-4147 mg of calcium and 2701-3068 IU of vitamin D per day. It is possible to meet your calcium requirement with diet alone, but a vitamin D supplement is usually necessary to meet this goal.  · When exposed to the sun, use a sunscreen that protects against both UVA and UVB radiation with an SPF of 30 or greater. Reapply every 2 to 3 hours or after sweating, drying off with a towel, or swimming. · Always wear a seat belt when traveling in a car. Always wear a helmet when riding a bicycle or motorcycle.

## 2021-02-26 NOTE — PROGRESS NOTES
Medicare Annual Wellness Visit  Name: Giana Perez Date: 2021   MRN: [id number] Sex: Female   Age: 68 y.o. Ethnicity: Non-/Non    : 1940 Race: Vidhya Goldsmith is here for Medicare AWV    Screenings for behavioral, psychosocial and functional/safety risks, and cognitive dysfunction are all negative except as indicated below. These results, as well as other patient data from the [address] form, are documented in Flowsheets linked to this Encounter. Overall doing well  No specific complaints  Aware of weight gain    Allergies   Allergen Reactions    Ceftin [Cefuroxime Axetil]      Diarrhea         Prior to Visit Medications    Medication Sig Taking? Authorizing Provider   metFORMIN (GLUCOPHAGE-XR) 500 MG extended release tablet TAKE 2 TABLETS DAILY WITH SUPPER Yes Destinee Curtis MD   atorvastatin (LIPITOR) 10 MG tablet TAKE 1 TABLET DAILY Yes Destinee Curtis MD   levothyroxine (SYNTHROID) 75 MCG tablet TAKE 1 TABLET EVERY MORNINGBEFORE BREAKFAST Yes Destinee Curtis MD   losartan (COZAAR) 50 MG tablet TAKE 1 TABLET DAILY Yes Destinee Curtis MD   Coenzyme Q-10 100 MG CAPS Take 1 capsule by mouth Daily Yes Historical Provider, MD   calcium citrate-vitamin D (CITRICAL + D) 315-250 MG-UNIT TABS Take 2 tablets by mouth. Yes Historical Provider, MD   aspirin 81 MG tablet Take 81 mg by mouth daily.    Yes Historical Provider, MD         Past Medical History:   Diagnosis Date    CAD (coronary artery disease)     Hypercholesterolemia     Hypertension     Pericarditis     history of    Vertigo        Past Surgical History:   Procedure Laterality Date    CATARACT REMOVAL Left 2012    FRACTURE SURGERY      repair    TONSILLECTOMY           Family History   Problem Relation Age of Onset    Dementia Mother     Pancreatic Cancer Father        CareTeam (Including outside providers/suppliers regularly involved in providing care):   Patient Care Team: Norberto Cedillo MD as PCP - General (Family Medicine)  Norberto Cedillo MD as PCP - [facility] Empaneled Provider  iTffany Masters MD as Consulting Physician (Cardiology)    Wt Readings from Last 3 Encounters:   02/26/21 192 lb (87.1 kg)   02/24/20 179 lb (81.2 kg)   08/27/19 179 lb (81.2 kg)     Vitals:    02/26/21 1105   BP: 118/78   Pulse: 110   Temp: 97.3 °F (36.3 °C)   SpO2: 97%   Weight: 192 lb (87.1 kg)   Height: 5' 4\" (1.626 m)     Body mass index is 32.96 kg/m². Based upon direct observation of the patient, evaluation of cognition reveals recent and remote memory intact.     General Appearance: alert and oriented to person, place and time, well developed and well- nourished, in no acute distress  Skin: warm and dry, no rash or erythema  Head: normocephalic and atraumatic  Eyes: pupils equal, round, and reactive to light, extraocular eye movements intact, conjunctivae normal  ENT: tympanic membrane, external ear and ear canal normal bilaterally  Neck: supple and non-tender without mass, no thyromegaly or thyroid nodules, no cervical lymphadenopathy  Pulmonary/Chest: clear to auscultation bilaterally- no wheezes, rales or rhonchi, normal air movement, no respiratory distress  Cardiovascular: normal rate, regular rhythm, normal S1 and S2, no murmurs, rubs, clicks, or gallops, distal pulses intact, no carotid bruits  Abdomen: soft, non-tender, non-distended, normal bowel sounds, no masses or organomegaly  Breast: appear normal, no suspicious masses, no skin or nipple changes or axillary nodes  Extremities: no cyanosis, clubbing or edema  Musculoskeletal: normal range of motion, no joint swelling, deformity or tenderness  Neurologic: reflexes normal and symmetric, no cranial nerve deficit, gait, coordination and speech normal Patient's complete Health Risk Assessment and screening values have been reviewed and are found in Flowsheets. The following problems were reviewed today and where indicated follow up appointments were made and/or referrals ordered.     Positive Risk Factor Screenings with Interventions:           Health Habits/Nutrition:  Health Habits/Nutrition  Do you exercise for at least 20 minutes 2-3 times per week?: (!) No  Have you lost any weight without trying in the past 3 months?: No  Do you eat only one meal per day?: No  Have you seen the dentist within the past year?: Yes  Body mass index: (!) 32.95  Health Habits/Nutrition Interventions:  · Healthy diet and more exercise     Safety:  Safety  Do you have working smoke detectors?: Yes  Have all throw rugs been removed or fastened?: Yes  Do you have non-slip mats or surfaces in all bathtubs/showers?: (!) No  Do all of your stairways have a railing or banister?: Yes  Are your doorways, halls and stairs free of clutter?: Yes  Do you always fasten your seatbelt when you are in a car?: Yes  Safety Interventions:  · Patient declines any further evaluation/treatment for this issue     Personalized Preventive Plan   Current Health Maintenance Status  Immunization History   Administered Date(s) Administered    COVID-19, Fidencio Wyatt, 30mcg/0.3ml Dose 01/31/2021, 02/22/2021    Influenza Virus Vaccine 10/22/2014, 10/22/2014    Influenza, High Dose (Fluzone 65 yrs and older) 12/11/2015, 10/28/2016, 10/28/2016, 10/25/2017, 10/20/2018, 10/06/2019    Influenza, Quadv, adjuvanted, 65 yrs +, IM, PF (Fluad) 10/15/2020    Pneumococcal Conjugate 13-valent ([id number]) 02/13/2015    Pneumococcal Polysaccharide ([id number]) 02/18/2019    Tdap (Boostrix, Adacel) 07/29/2014    Zoster Recombinant (Shingrix) 05/06/2019, 05/13/2019, 09/12/2019        Health Maintenance   Topic Date Due    Hepatitis C screen  1940    Annual Wellness Visit (AWV)  05/29/2019    Lipid screen  02/17/2022  TSH testing  02/17/2022    Potassium monitoring  02/17/2022    Creatinine monitoring  02/17/2022    Breast cancer screen  10/29/2022    DTaP/Tdap/Td vaccine (2 - Td) 07/29/2024    DEXA (modify frequency per FRAX score)  Completed    Flu vaccine  Completed    Shingles Vaccine  Completed    Pneumococcal 65+ years Vaccine  Completed    COVID-19 Vaccine  Completed    Hepatitis A vaccine  Aged Out    Hepatitis B vaccine  Aged Out    Hib vaccine  Aged Out    Meningococcal (ACWY) vaccine  Aged Out     Recommendations for [facility] Due: see orders and patient instructions/AVS.  . Recommended screening schedule for the next 5-10 years is provided to the patient in written form: see Patient Instructions/AVS.    Cheryl Mcneil was seen today for medicare aw.     Diagnoses and all orders for this visit:    Medicare annual wellness visit, subsequent  Healthy Diet, more exercise  DEXA scan    Essential hypertension  Stable, continue medication  -     losartan (COZAAR) 50 MG tablet; TAKE 1 TABLET DAILY    Osteoporosis screening  Screen  -     DEXA BONE DENSITY AXIAL SKELETON; Future    Other orders  Refill  -     metFORMIN (GLUCOPHAGE-XR) 500 MG extended release tablet; TAKE 2 TABLETS DAILY WITH SUPPER  -     atorvastatin (LIPITOR) 10 MG tablet; TAKE 1 TABLET DAILY  -     levothyroxine (SYNTHROID) 75 MCG tablet; TAKE 1 TABLET EVERY MORNINGBEFORE BREAKFAST

## 2021-03-24 LAB — DIABETIC RETINOPATHY: NEGATIVE

## 2021-08-27 ENCOUNTER — PATIENT MESSAGE (OUTPATIENT)
Dept: FAMILY MEDICINE CLINIC | Age: 81
End: 2021-08-27

## 2021-08-27 DIAGNOSIS — L98.9 SKIN ABNORMALITIES: Primary | ICD-10-CM

## 2021-08-27 NOTE — TELEPHONE ENCOUNTER
From: Darla Koyanagi  To: Lobo Arriola MD  Sent: 8/27/2021 9:42 AM EDT  Subject: Non-Urgent Medical Question    Can you recommend a dermatologist in the [place], [place] area who is accepting new patients. Preferably someone in the [facility] group where I can use [address] network diskst.

## 2021-09-28 ENCOUNTER — TELEPHONE (OUTPATIENT)
Dept: FAMILY MEDICINE CLINIC | Age: 81
End: 2021-09-28

## 2021-09-28 DIAGNOSIS — M85.851 OSTEOPENIA OF BOTH HIPS: ICD-10-CM

## 2021-09-28 DIAGNOSIS — Z78.0 POSTMENOPAUSAL: Primary | ICD-10-CM

## 2021-09-28 DIAGNOSIS — M85.852 OSTEOPENIA OF BOTH HIPS: ICD-10-CM

## 2021-09-28 NOTE — TELEPHONE ENCOUNTER
[facility] with [address] calls in to request an updated Order for the patient's Dexa Scan. Medicare rejected the diagnosis code on the order. [facility] is requesting the diagnosis code be updated so that the patient can be scheduled for testing.

## 2021-11-02 ENCOUNTER — HOSPITAL ENCOUNTER (OUTPATIENT)
Dept: WOMENS IMAGING | Age: 81
Discharge: HOME | End: 2021-11-02
Payer: MEDICARE

## 2021-11-02 ENCOUNTER — HOSPITAL ENCOUNTER (OUTPATIENT)
Dept: GENERAL RADIOLOGY | Age: 81
Discharge: HOME | End: 2021-11-02
Payer: MEDICARE

## 2021-11-02 VITALS — BODY MASS INDEX: 33.12 KG/M2 | HEIGHT: 64 IN | WEIGHT: 194 LBS

## 2021-11-02 DIAGNOSIS — Z12.31 VISIT FOR SCREENING MAMMOGRAM: ICD-10-CM

## 2021-11-02 DIAGNOSIS — M85.851 OSTEOPENIA OF BOTH HIPS: ICD-10-CM

## 2021-11-02 DIAGNOSIS — M85.852 OSTEOPENIA OF BOTH HIPS: ICD-10-CM

## 2021-11-02 DIAGNOSIS — Z78.0 POSTMENOPAUSAL: ICD-10-CM

## 2021-11-02 PROCEDURE — 77063 BREAST TOMOSYNTHESIS BI: CPT

## 2021-11-02 PROCEDURE — 77080 DXA BONE DENSITY AXIAL: CPT

## 2021-11-16 ENCOUNTER — PATIENT MESSAGE (OUTPATIENT)
Dept: FAMILY MEDICINE CLINIC | Age: 81
End: 2021-11-16

## 2021-11-16 PROBLEM — C44.729 SQUAMOUS CELL CARCINOMA OF LOWER LEG, LEFT: Status: ACTIVE | Noted: 2021-11-16

## 2021-11-17 NOTE — TELEPHONE ENCOUNTER
From: Camila Christy  To: Dr. Howard Southern: 2021 1:09 PM EST  Subject: Non-Urgent Medical Question    would you please add to my chart the followin) got flu shot at [facility] on [facility] in [facility] on 2021  2) got Nicolas Peter booster shot at same drug store on 2021

## 2021-12-14 ENCOUNTER — VIRTUAL VISIT (OUTPATIENT)
Dept: FAMILY MEDICINE CLINIC | Age: 81
End: 2021-12-14
Payer: MEDICARE

## 2021-12-14 ENCOUNTER — TELEPHONE (OUTPATIENT)
Dept: FAMILY MEDICINE CLINIC | Age: 81
End: 2021-12-14

## 2021-12-14 DIAGNOSIS — J06.9 URI, ACUTE: ICD-10-CM

## 2021-12-14 DIAGNOSIS — R05.9 COUGH: Primary | ICD-10-CM

## 2021-12-14 DIAGNOSIS — R52 BODY ACHES: ICD-10-CM

## 2021-12-14 LAB
INFLUENZA A ANTIBODY: ABNORMAL
INFLUENZA B ANTIBODY: ABNORMAL

## 2021-12-14 PROCEDURE — 99442 PR PHYS/QHP TELEPHONE EVALUATION 11-20 MIN: CPT | Performed by: FAMILY MEDICINE

## 2021-12-14 PROCEDURE — 87804 INFLUENZA ASSAY W/OPTIC: CPT | Performed by: FAMILY MEDICINE

## 2021-12-14 NOTE — PROGRESS NOTES
Little Hurtado is a 80 y.o. female. HPI:  Due to the current coronavirus pandemic, this telephone/video visit was insisted, with patient's consent, to reduce the patient's risk of exposure to COVID-19 and provide continuity of care for an established patient. The patient was at home while the provider was either at home or at the clinic. Services were provided through a synchronous discussion over the telephone and/or video chat to substitute for in person clinic visit, and coded as such. Telephone call with this patient she is concerned about congestion and cough and body achiness since Saturday/sunday   Fever for 1 day, no significant sore throat  Fully vaccinated with booster COVID and flu vaccine November    Nasal discharge, postnasal drip and some cough  Does Not feel particularly short of breath, can smell and taste  Wishes to be screened not to infect anyone    Meds, vitamins and allergies reviewed with pt    Wt Readings from Last 3 Encounters:   11/02/21 194 lb (88 kg)   02/26/21 192 lb (87.1 kg)   02/24/20 179 lb (81.2 kg)       REVIEW OF SYSTEMS:   CONSTITUTIONAL: See history of present illness,   Weight noted   HEENT: No new vision difficulties or ringing in the ears. RESPIRATORY: No new SOB, PND, orthopnea or cough. CARDIOVASCULAR: no CP, palpitations or SOB with exertion  GI: No nausea, vomiting, diarrhea, constipation, abdominal pain or changes in bowel habits. : No urinary frequency, urgency, incontinence hematuria or dysuria. SKIN: No cyanosis or skin lesions. MUSCULOSKELETAL: No new muscle or joint pain. NEUROLOGICAL: No syncope or TIA-like symptoms. PSYCHIATRIC: No anxiety, insomnia or depression     Allergies   Allergen Reactions    Ceftin [Cefuroxime Axetil]      Diarrhea       Prior to Visit Medications    Medication Sig Taking?  Authorizing Provider   metFORMIN (GLUCOPHAGE-XR) 500 MG extended release tablet TAKE 2 TABLETS DAILY WITH SUPPER Yes Matt Rosa MD atorvastatin (LIPITOR) 10 MG tablet TAKE 1 TABLET DAILY Yes Gideon Hernandez MD   levothyroxine (SYNTHROID) 75 MCG tablet TAKE 1 TABLET EVERY MORNINGBEFORE BREAKFAST Yes Gideon Hernandez MD   losartan (COZAAR) 50 MG tablet TAKE 1 TABLET DAILY Yes Gideon Hernandez MD   Coenzyme Q-10 100 MG CAPS Take 1 capsule by mouth Daily Yes Historical Provider, MD   calcium citrate-vitamin D (CITRICAL + D) 315-250 MG-UNIT TABS Take 2 tablets by mouth. Yes Historical Provider, MD   aspirin 81 MG tablet Take 81 mg by mouth daily. Yes Historical Provider, MD       Past Medical History:   Diagnosis Date    CAD (coronary artery disease)     Hypercholesterolemia     Hypertension     Pericarditis     history of    Vertigo        Social History     Tobacco Use    Smoking status: Former Smoker     Packs/day: 1.00     Years: 4.00     Pack years: 4.00     Types: Cigarettes     Quit date: 1965     Years since quittin.5    Smokeless tobacco: Never Used    Tobacco comment: stopped using cig many years ago late teens early 19's   Substance Use Topics    Alcohol use: Yes     Comment: seldom       Family History   Problem Relation Age of Onset    Dementia Mother     Pancreatic Cancer Father        OBJECTIVE:  There were no vitals taken for this visit. GEN:  alert and pleasant, sounds congested over the telephone  Respiratory rate seems normal  No abdominal pain or rash  Can smell and taste    ASSESSMENT/PLAN:  1. Cough  Screen  - COVID-19    2.  URI, acute  Screen  - POCT Influenza A/B    3. Body aches  Screen  - POCT Influenza A/B  - COVID-19    Alternate 650 mg tylenol with  400 mg advil  Every 6-8 hrs as needed for fever, pain  Rest, fluids, and screening as above  Flonase and Claritin    Addendum, her influenza test was positive for influenza A  Rest, fluids, alternate Tylenol with Advil   has had her flu vaccine    15 Total Minutes spent pre charting (reviewing problem list, reviewing health maintenance items , meds, any test results, consultant and hospital notes ) and  obtaining present visit history, performing appropriate medical exam/evaluation, counseling and educating the patient (and family), ordering medications ,tests, and procedures as needed, refilling medication(s), placing referral(s) when needed in addition to coordinating care for this patient and documenting in electronic health record

## 2021-12-14 NOTE — TELEPHONE ENCOUNTER
Symptom duration, days:  [] 1   [] 2   [] 3   [x] 4   [] 5   [] 6   [] 7   [] 8   [] 9   [] 10   [] 11   [] 12   [] 13   [] 14 or greater    Symptom course:   [] Worsening     [x] Stable     [] Improving    ** FEVER or Chills should be directed to the Flu Clinic **      [x] Cough  [] Chest Congestion  [x] Feeling short of breath  [x] Sometimes  [] Frequently  [] All the time  [x] Headaches  [x]Tolerable  [] Severe  [x] Sore throat  [x] Muscle aches  [] Nausea  [] Vomiting  []Unable to keep fluids down  [x] Diarrhea  []Severe    [x] OTHER SYMPTOMS: Nausea, no fever now, chills     RISK FACTORS:    [] Pregnant or possibly pregnant  [] Age over 61  [] Diabetes  [] Heart disease  [] Asthma  [] COPD/Other chronic lung diseases  [] Active Cancer  [] On Chemotherapy  [] Taking oral steroids  [] History Lymphoma/Leukemia  [] Close contact with a lab confirmed COVID-19 patient within 14 days of symptom onset  [] History of travel from affected geographical areas within 14 days of symptom onset    VITALS: if able to check at home   Temp: Normal  Pulse:   Pulse Ox:

## 2021-12-15 LAB — SARS-COV-2: NOT DETECTED

## 2022-02-08 DIAGNOSIS — E03.9 ACQUIRED HYPOTHYROIDISM: ICD-10-CM

## 2022-02-08 DIAGNOSIS — R73.03 PREDIABETES: ICD-10-CM

## 2022-02-08 DIAGNOSIS — I10 ESSENTIAL HYPERTENSION: Primary | ICD-10-CM

## 2022-02-08 DIAGNOSIS — E78.2 MIXED HYPERLIPIDEMIA: ICD-10-CM

## 2022-02-12 LAB
A/G RATIO: 1.4 (CALC) (ref 1–2.5)
ALBUMIN SERPL-MCNC: 4 G/DL (ref 3.6–5.1)
ALP BLD-CCNC: 101 U/L (ref 37–153)
ALT SERPL-CCNC: 13 U/L (ref 6–29)
AST SERPL-CCNC: 13 U/L (ref 10–35)
ATYPICAL LYMPHOCYTE RELATIVE PERCENT: NORMAL % (ref 0–10)
BAND NEUTROPHILS: NORMAL %
BANDED NEUTROPHILS ABSOLUTE COUNT: NORMAL CELLS/UL (ref 0–750)
BASOPHILS ABSOLUTE: 39 CELLS/UL (ref 0–200)
BASOPHILS RELATIVE PERCENT: 0.6 %
BILIRUB SERPL-MCNC: 0.3 MG/DL (ref 0.2–1.2)
BLASTS ABSOLUTE COUNT: NORMAL CELLS/UL
BLASTS RELATIVE PERCENT: NORMAL %
BUN / CREAT RATIO: NORMAL (CALC) (ref 6–22)
BUN BLDV-MCNC: 22 MG/DL (ref 7–25)
CALCIUM SERPL-MCNC: 9.8 MG/DL (ref 8.6–10.4)
CHLORIDE BLD-SCNC: 105 MMOL/L (ref 98–110)
CHOLESTEROL, TOTAL: 137 MG/DL
CHOLESTEROL/HDL RATIO: 2.9 (CALC)
CO2: 27 MMOL/L (ref 20–32)
COMMENT: NORMAL
CREAT SERPL-MCNC: 0.86 MG/DL (ref 0.6–0.93)
EOSINOPHILS ABSOLUTE: 189 CELLS/UL (ref 15–500)
EOSINOPHILS RELATIVE PERCENT: 2.9 %
GFR AFRICAN AMERICAN: 74 ML/MIN/1.73M2
GFR NON-AFRICAN AMERICAN: 64 ML/MIN/1.73M2
GLOBULIN: 2.9 G/DL (CALC) (ref 1.9–3.7)
GLUCOSE BLD-MCNC: 98 MG/DL (ref 65–99)
HBA1C MFR BLD: 6.2 % OF TOTAL HGB
HCT VFR BLD CALC: 39.6 % (ref 35–45)
HDLC SERPL-MCNC: 48 MG/DL
HEMOGLOBIN: 12.8 G/DL (ref 11.7–15.5)
LDL CHOLESTEROL CALCULATED: 68 MG/DL (CALC)
LYMPHOCYTES ABSOLUTE: 1950 CELLS/UL (ref 850–3900)
LYMPHOCYTES RELATIVE PERCENT: 30 %
MCH RBC QN AUTO: 29.4 PG (ref 27–33)
MCHC RBC AUTO-ENTMCNC: 32.3 G/DL (ref 32–36)
MCV RBC AUTO: 90.8 FL (ref 80–100)
METAMYELOCYTES ABSOLUTE COUNT: NORMAL CELLS/UL
METAMYELOCYTES RELATIVE PERCENT: NORMAL %
MONOCYTES ABSOLUTE: 572 CELLS/UL (ref 200–950)
MONOCYTES RELATIVE PERCENT: 8.8 %
MYELOCYTE PERCENT: NORMAL %
MYELOCYTES ABSOLUTE COUNT: NORMAL CELLS/UL
NEUTROPHILS ABSOLUTE: 3751 CELLS/UL (ref 1500–7800)
NONHDLC SERPL-MCNC: 89 MG/DL (CALC)
NUCLEATED RED BLOOD CELLS: NORMAL /100 WBC
NUCLEATED RED BLOOD CELLS: NORMAL CELLS/UL
PDW BLD-RTO: 14.5 % (ref 11–15)
PLATELET # BLD: 347 THOUSAND/UL (ref 140–400)
PMV BLD AUTO: 8.9 FL (ref 7.5–12.5)
POTASSIUM SERPL-SCNC: 4.4 MMOL/L (ref 3.5–5.3)
PROMYELOCYTES ABSOLUTE COUNT: NORMAL CELLS/UL
PROMYELOCYTES PERCENT: NORMAL %
RBC # BLD: 4.36 MILLION/UL (ref 3.8–5.1)
SEGMENTED NEUTROPHILS RELATIVE PERCENT: 57.7 %
SODIUM BLD-SCNC: 140 MMOL/L (ref 135–146)
T4 FREE: 1.2 NG/DL (ref 0.8–1.8)
TOTAL PROTEIN: 6.9 G/DL (ref 6.1–8.1)
TRIGL SERPL-MCNC: 128 MG/DL
TSH ULTRASENSITIVE: 3.54 MIU/L (ref 0.4–4.5)
WBC # BLD: 6.5 THOUSAND/UL (ref 3.8–10.8)

## 2022-02-14 NOTE — PROGRESS NOTES
[facility] Medicine Preoperative Evaluation       Sarita Gates M.D.     [address] [facility], [place], [address]     (phone) [phone]       (fax) [phone]    Dear Dr. Lia Berrios,        Thank you for referring Chandler Obrien to me for Preoperative Evaluation right  cataract surgery with [address]. Below are the relevant portions of my assessment and plan of care. Chandler Obrien    81 y.o.   1940    [address] [facility] [address]    Vitals:    02/15/22 0954 02/15/22 1021   BP: (!) 170/98 (!) 160/100   Pulse: 91    Temp: 96.9 °F (36.1 °C)    SpO2: 96%    Weight: 193 lb (87.5 kg)    Height: 5' 4\" (1.626 m)       Wt Readings from Last 2 Encounters:   02/15/22 193 lb (87.5 kg)   11/02/21 194 lb (88 kg)     BP Readings from Last 3 Encounters:   02/15/22 (!) 160/100   02/26/21 118/78   02/24/20 114/78        Allergies   Allergen Reactions    Ceftin [Cefuroxime Axetil]      Diarrhea     Current Outpatient Medications   Medication Sig Dispense Refill    latanoprost (XALATAN) 0.005 % ophthalmic solution       losartan (COZAAR) 100 MG tablet TAKE 1 TABLET DAILY with breakkfast 90 tablet 1    amLODIPine (NORVASC) 2.5 MG tablet Take 1 tablet by mouth daily (before dinner) 30 tablet 1    levothyroxine (SYNTHROID) 75 MCG tablet TAKE 1 TABLET EVERY MORNINGBEFORE BREAKFAST 90 tablet 0    metFORMIN (GLUCOPHAGE-XR) 500 MG extended release tablet TAKE 2 TABLETS DAILY WITH SUPPER 180 tablet 3    atorvastatin (LIPITOR) 10 MG tablet TAKE 1 TABLET DAILY 90 tablet 3    Coenzyme Q-10 100 MG CAPS Take 1 capsule by mouth Daily      calcium citrate-vitamin D (CITRICAL + D) 315-250 MG-UNIT TABS Take 2 tablets by mouth.  aspirin 81 MG tablet Take 81 mg by mouth daily. No current facility-administered medications for this visit.        She presents to the office today for a preoperative consultation at the request of surgeon, Dr. Lia Berrios who plans on performing right cataract surgery on ,   The current problem began 6 months ago and has worsened. Conservative therapy, including surveillance , has failed. Planned anesthesia is Topical anesthesia. The patient has no known anesthesia issues. Patient has no bleeding risk. No loose teeth or dentures    Past Medical History:   Diagnosis Date    CAD (coronary artery disease)     Hypercholesterolemia     Hypertension     Pericarditis     history of    Vertigo      Past Surgical History:   Procedure Laterality Date    CATARACT REMOVAL Left 2012    FRACTURE SURGERY      repair    TONSILLECTOMY       Family History is not significant for reactions to anesthesia  Social History     Socioeconomic History    Marital status:      Spouse name: Not on file    Number of children: 2    Years of education: Not on file    Highest education level: Not on file   Occupational History    Not on file   Tobacco Use    Smoking status: Former Smoker     Packs/day: 1.00     Years: 4.00     Pack years: 4.00     Types: Cigarettes     Quit date: 1965     Years since quittin.7    Smokeless tobacco: Never Used    Tobacco comment: stopped using cig many years ago late teens early 19's   Vaping Use    Vaping Use: Never used   Substance and Sexual Activity    Alcohol use: Yes     Comment: seldom    Drug use: No    Sexual activity: Not on file   Other Topics Concern    Not on file   Social History Narrative    Not on file     Social Determinants of Health     Financial Resource Strain: Low Risk     Difficulty of Paying Living Expenses: Not hard at all   Food Insecurity: No Food Insecurity    Worried About Running Out of Food in the Last Year: Never true    [place] of Food in the Last Year: Never true   Transportation Needs: No Transportation Needs    Lack of Transportation (Medical): No    Lack of Transportation (Non-Medical):  No   Physical Activity:     Days of Exercise per Week: Not on file  Minutes of Exercise per Session: Not on file   Stress:     Feeling of Stress : Not on file   Social Connections:     Frequency of Communication with Friends and Family: Not on file    Frequency of Social Gatherings with Friends and Family: Not on file    Attends Congregational Services: Not on file    Active Member of Clubs or Organizations: Not on file    Attends Club or Organization Meetings: Not on file    Marital Status: Not on file   Intimate Partner Violence:     Fear of Current or Ex-Partner: Not on file    Emotionally Abused: Not on file    Physically Abused: Not on file    Sexually Abused: Not on file   Housing Stability:     Unable to Pay for Housing in the Last Year: Not on file    Number of [place] in the Last Year: Not on file    Unstable Housing in the Last Year: Not on file       REVIEW OF SYSTEMS:   CONSTITUTIONAL: No major weight gain or loss, fatigue, weakness, night sweats or fever. HEENT: Cataract causing some visual symptoms, no trouble with hearing  RESPIRATORY: getting  over a cold  CARDIOVASCULAR: no CP, palpitations or SOB with exertion  GI: No nausea, vomiting, diarrhea, constipation, abdominal pain or changes in bowel habits. : No urinary frequency, urgency, incontinence hematuria or dysuria. SKIN: No cyanosis or skin lesions. MUSCULOSKELETAL: No new muscle or joint pain. NEUROLOGICAL: No syncope or TIA-like symptoms. PSYCHIATRIC: No anxiety, insomnia or depression     Physical Exam   BP (!) 160/100   Pulse 91   Temp 96.9 °F (36.1 °C)   Ht 5' 4\" (1.626 m)   Wt 193 lb (87.5 kg)   SpO2 96%   BMI 33.13 kg/m²   Constitutional: Patient is oriented to person, place, and time. She appears in no distress. Head: Normocephalic and atraumatic. Right Ear: Tympanic membrane, external ear and ear canal normal.   Left Ear: Tympanic membrane, external ear and ear canal normal.   Nose: Nose normal.   Mouth/Throat: There is no cervical adenopathy.   There are no loose teeth. Eyes: Conjunctivae and extraocular motions are normal. Pupils are equal, round, and reactive to light bilaterally. Neck: Neck supple. No JVD present. No mass and no thyromegaly present. Cardiovascular: Normal rate, regular rhythm, normal heart sounds and intact distal pulses. Exam reveals no gallop and no friction rub. No murmur heard. Breasts: No masses discharge or skin changes bilaterally  Pulmonary/Chest: Effort normal and breath sounds normal. No respiratory distress. There are no wheezes, rhonchi or rales. Abdominal: Soft, non-tender. Normal bowel sounds and aorta. There is no organomegaly, bruit or palpable mass. Neurological: She is alert and oriented to person, place, and time. She has normal reflexes. No cranial nerve deficit. Coordination normal.   Skin: Skin is warm and dry. There is no rash or erythema. No suspicious lesions noted. Psychiatric: She has a normal mood and affect.  Speech and behavior are normal. Judgment, cognition and memory are normal.     EKG Interpretation: not performed     Labs all acceptable     Lab Review   Orders Only on 02/08/2022   Component Date Value    Cholesterol, Total 02/11/2022 137     HDL 02/11/2022 48*    Triglycerides 02/11/2022 128     LDL Calculated 02/11/2022 68     Chol/HDL Ratio 02/11/2022 2.9     Cholesterol non HDL 02/11/2022 89     Glucose 02/11/2022 98     BUN 02/11/2022 22     CREATININE 02/11/2022 0.86     GFR Non- 02/11/2022 64     GFR  02/11/2022 74     BUN/Creatinine Ratio 81/19/1041 NOT APPLICABLE     Sodium 09/20/7447 140     Potassium 02/11/2022 4.4     Chloride 02/11/2022 105     CO2 02/11/2022 27     Calcium 02/11/2022 9.8     Total Protein 02/11/2022 6.9     Albumin 02/11/2022 4.0     Globulin 02/11/2022 2.9     Albumin/Globulin Ratio 02/11/2022 1.4     Total Bilirubin 02/11/2022 0.3     Alkaline Phosphatase 02/11/2022 101     AST 02/11/2022 13     ALT 02/11/2022 13  WBC 02/11/2022 6.5     RBC 02/11/2022 4.36     Hemoglobin 02/11/2022 12.8     Hematocrit 02/11/2022 39.6     MCV 02/11/2022 90.8     MCH 02/11/2022 29.4     MCHC 02/11/2022 32.3     RDW 02/11/2022 14.5     Platelets 25/38/9698 347     MPV 02/11/2022 8.9     Neutrophils Absolute 02/11/2022 3,751     Bands Absolute 02/11/2022 CANCELED     Metamyelocytes Absolute 02/11/2022 CANCELED     Myelocytes Absolute 02/11/2022 CANCELED     Promyelocytes Absolute 02/11/2022 CANCELED     Lymphocytes Absolute 02/11/2022 1,950     Monocytes Absolute 02/11/2022 572     Eosinophils Absolute 02/11/2022 189     Basophils Absolute 02/11/2022 39     Blasts Absolute 02/11/2022 CANCELED     nRBC 02/11/2022 CANCELED     Segs Relative 02/11/2022 57.7     Band Neutrophils 02/11/2022 CANCELED     Metamyelocytes Relative 02/11/2022 CANCELED     Myelocyte Percent 02/11/2022 CANCELED     Promyelocytes Percent 02/11/2022 CANCELED     Lymphocytes % 02/11/2022 30.0     Atypical Lymphocytes Rel* 02/11/2022 CANCELED     Monocytes % 02/11/2022 8.8     Eosinophils % 02/11/2022 2.9     Basophils % 02/11/2022 0.6     Blasts Relative 02/11/2022 CANCELED     nRBC 02/11/2022 CANCELED     Comment 02/11/2022 CANCELED     T4 Free 02/11/2022 1.2     TSH 02/11/2022 3.54     Hemoglobin A1C 02/11/2022 6.2*          Assessment:  Encounter Diagnoses   Name Primary?  Preop examination Yes    Senile cataract of right eye, unspecified age-related cataract type     Essential hypertension     Acquired hypothyroidism     Mixed hyperlipidemia     Prediabetes         80 y.o. patient with planned surgery as above. Known risk factors for perioperative complications:         Plan:    1. Preoperative workup as follows: hemoglobin, hematocrit, electrolytes, creatinine, glucose, liver function studies. 2. Change in medication regimen before surgery: discontinue NSAIDs (asa+ fish oil) 7d before surgery.   3. Prophylaxis for cardiac events with perioperative beta-blockers: not indicated. 4. Invasive hemodynamic monitoring perioperatively: not indicated. 5. Patient is cleared for upcoming surgery. 6.  Hypertension, will adjust meds for better blood pressure control        If you have questions, please do not hesitate to call me. Sincerely,        Unice Leventhal.  Samantha Whipple M.D.

## 2022-02-15 ENCOUNTER — OFFICE VISIT (OUTPATIENT)
Dept: FAMILY MEDICINE CLINIC | Age: 82
End: 2022-02-15
Payer: MEDICARE

## 2022-02-15 VITALS
OXYGEN SATURATION: 96 % | SYSTOLIC BLOOD PRESSURE: 160 MMHG | HEIGHT: 64 IN | TEMPERATURE: 96.9 F | WEIGHT: 193 LBS | DIASTOLIC BLOOD PRESSURE: 100 MMHG | BODY MASS INDEX: 32.95 KG/M2 | HEART RATE: 91 BPM

## 2022-02-15 DIAGNOSIS — E78.2 MIXED HYPERLIPIDEMIA: ICD-10-CM

## 2022-02-15 DIAGNOSIS — H25.9 SENILE CATARACT OF RIGHT EYE, UNSPECIFIED AGE-RELATED CATARACT TYPE: ICD-10-CM

## 2022-02-15 DIAGNOSIS — R73.03 PREDIABETES: ICD-10-CM

## 2022-02-15 DIAGNOSIS — I10 ESSENTIAL HYPERTENSION: ICD-10-CM

## 2022-02-15 DIAGNOSIS — Z01.818 PREOP EXAMINATION: Primary | ICD-10-CM

## 2022-02-15 DIAGNOSIS — E03.9 ACQUIRED HYPOTHYROIDISM: ICD-10-CM

## 2022-02-15 PROCEDURE — 1123F ACP DISCUSS/DSCN MKR DOCD: CPT | Performed by: FAMILY MEDICINE

## 2022-02-15 PROCEDURE — 1036F TOBACCO NON-USER: CPT | Performed by: FAMILY MEDICINE

## 2022-02-15 PROCEDURE — 1090F PRES/ABSN URINE INCON ASSESS: CPT | Performed by: FAMILY MEDICINE

## 2022-02-15 PROCEDURE — G8417 CALC BMI ABV UP PARAM F/U: HCPCS | Performed by: FAMILY MEDICINE

## 2022-02-15 PROCEDURE — 4040F PNEUMOC VAC/ADMIN/RCVD: CPT | Performed by: FAMILY MEDICINE

## 2022-02-15 PROCEDURE — G8427 DOCREV CUR MEDS BY ELIG CLIN: HCPCS | Performed by: FAMILY MEDICINE

## 2022-02-15 PROCEDURE — G8399 PT W/DXA RESULTS DOCUMENT: HCPCS | Performed by: FAMILY MEDICINE

## 2022-02-15 PROCEDURE — G8482 FLU IMMUNIZE ORDER/ADMIN: HCPCS | Performed by: FAMILY MEDICINE

## 2022-02-15 PROCEDURE — 99214 OFFICE O/P EST MOD 30 MIN: CPT | Performed by: FAMILY MEDICINE

## 2022-02-15 RX ORDER — AMLODIPINE BESYLATE 2.5 MG/1
2.5 TABLET ORAL
Qty: 30 TABLET | Refills: 1 | Status: SHIPPED | OUTPATIENT
Start: 2022-02-15 | End: 2022-02-21

## 2022-02-15 RX ORDER — LATANOPROST 50 UG/ML
SOLUTION/ DROPS OPHTHALMIC
COMMUNITY
Start: 2022-01-17

## 2022-02-15 RX ORDER — LOSARTAN POTASSIUM 100 MG/1
TABLET ORAL
Qty: 90 TABLET | Refills: 1 | Status: SHIPPED | OUTPATIENT
Start: 2022-02-15 | End: 2022-05-13 | Stop reason: ALTCHOICE

## 2022-02-21 ENCOUNTER — OFFICE VISIT (OUTPATIENT)
Dept: FAMILY MEDICINE CLINIC | Age: 82
End: 2022-02-21
Payer: MEDICARE

## 2022-02-21 VITALS
WEIGHT: 194.6 LBS | OXYGEN SATURATION: 94 % | BODY MASS INDEX: 33.4 KG/M2 | HEART RATE: 86 BPM | SYSTOLIC BLOOD PRESSURE: 144 MMHG | DIASTOLIC BLOOD PRESSURE: 88 MMHG

## 2022-02-21 DIAGNOSIS — I10 ESSENTIAL HYPERTENSION: Primary | ICD-10-CM

## 2022-02-21 PROCEDURE — 1036F TOBACCO NON-USER: CPT | Performed by: FAMILY MEDICINE

## 2022-02-21 PROCEDURE — G8417 CALC BMI ABV UP PARAM F/U: HCPCS | Performed by: FAMILY MEDICINE

## 2022-02-21 PROCEDURE — G8427 DOCREV CUR MEDS BY ELIG CLIN: HCPCS | Performed by: FAMILY MEDICINE

## 2022-02-21 PROCEDURE — 4040F PNEUMOC VAC/ADMIN/RCVD: CPT | Performed by: FAMILY MEDICINE

## 2022-02-21 PROCEDURE — G8399 PT W/DXA RESULTS DOCUMENT: HCPCS | Performed by: FAMILY MEDICINE

## 2022-02-21 PROCEDURE — 1090F PRES/ABSN URINE INCON ASSESS: CPT | Performed by: FAMILY MEDICINE

## 2022-02-21 PROCEDURE — G8482 FLU IMMUNIZE ORDER/ADMIN: HCPCS | Performed by: FAMILY MEDICINE

## 2022-02-21 PROCEDURE — 1123F ACP DISCUSS/DSCN MKR DOCD: CPT | Performed by: FAMILY MEDICINE

## 2022-02-21 PROCEDURE — 99213 OFFICE O/P EST LOW 20 MIN: CPT | Performed by: FAMILY MEDICINE

## 2022-02-21 RX ORDER — AMLODIPINE BESYLATE 5 MG/1
5 TABLET ORAL
Qty: 90 TABLET | Refills: 1 | Status: SHIPPED | OUTPATIENT
Start: 2022-02-21 | End: 2022-06-07

## 2022-02-21 NOTE — PROGRESS NOTES
Linda Trevino is a 80 y.o. female. HPI:  Here for BP check after medication adjustment prior to cataract surgery tomorrow  Increased losartan 100 mg in the morning and added on 2.5 mg  with dinner  Blood pressure improved to 144/88    Meds, vitamins and allergies reviewed with pt    Wt Readings from Last 3 Encounters:   02/21/22 194 lb 9.6 oz (88.3 kg)   02/15/22 193 lb (87.5 kg)   11/02/21 194 lb (88 kg)       REVIEW OF SYSTEMS:   CONSTITUTIONAL: See history of present illness,   Weight noted   HEENT: Upcoming cataract surgery  RESPIRATORY: No new SOB, PND, orthopnea or cough. CARDIOVASCULAR: no CP, palpitations or SOB with exertion  GI: No nausea, vomiting, diarrhea, constipation, abdominal pain or changes in bowel habits. : No urinary frequency, urgency, incontinence hematuria or dysuria. SKIN: No cyanosis or skin lesions. MUSCULOSKELETAL: No new muscle or joint pain. NEUROLOGICAL: No syncope or TIA-like symptoms. PSYCHIATRIC: No anxiety, insomnia or depression     Allergies   Allergen Reactions    Ceftin [Cefuroxime Axetil]      Diarrhea       Prior to Visit Medications    Medication Sig Taking? Authorizing Provider   amLODIPine (NORVASC) 5 MG tablet Take 1 tablet by mouth daily (before dinner) Yes Paramjit Iqbal MD   latanoprost (XALATAN) 0.005 % ophthalmic solution  Yes Historical Provider, MD   losartan (COZAAR) 100 MG tablet TAKE 1 TABLET DAILY with breakkfast Yes Paramjit Iqbal MD   levothyroxine (SYNTHROID) 75 MCG tablet TAKE 1 TABLET EVERY MORNINGBEFORE BREAKFAST Yes Paramjit Iqbal MD   metFORMIN (GLUCOPHAGE-XR) 500 MG extended release tablet TAKE 2 TABLETS DAILY WITH SUPPER Yes Paramjit Iqbal MD   atorvastatin (LIPITOR) 10 MG tablet TAKE 1 TABLET DAILY Yes Paramjit Iqbal MD   Coenzyme Q-10 100 MG CAPS Take 1 capsule by mouth Daily Yes Historical Provider, MD   calcium citrate-vitamin D (CITRICAL + D) 315-250 MG-UNIT TABS Take 2 tablets by mouth.  Yes Historical Provider, MD   aspirin 81 MG tablet Take 81 mg by mouth daily. Yes Historical Provider, MD       Past Medical History:   Diagnosis Date    CAD (coronary artery disease)     Hypercholesterolemia     Hypertension     Pericarditis     history of    Vertigo        Social History     Tobacco Use    Smoking status: Former Smoker     Packs/day: 1.00     Years: 4.00     Pack years: 4.00     Types: Cigarettes     Quit date: 1965     Years since quittin.6    Smokeless tobacco: Never Used    Tobacco comment: stopped using cig many years ago late teens early 19's   Substance Use Topics    Alcohol use: Yes     Comment: seldom       Family History   Problem Relation Age of Onset    Dementia Mother     Pancreatic Cancer Father        OBJECTIVE:  BP (!) 144/88   Pulse 86   Wt 194 lb 9.6 oz (88.3 kg)   SpO2 94%   BMI 33.40 kg/m²   GEN:  in NAD  HEENT:  NCAT  NECK:  Supple without adenopathy. CV:  Regular rate and rhythm, S1 and S2 normal, no murmurs, clicks  PULM:  Chest is clear, no wheezing ,  symmetric air entry throughout both lung fields. ABD: Soft, NT  EXT: No rash or edema  NEURO: Alert oriented ×3, nonfocal, no assistive    ASSESSMENT/PLAN:  1.  Essential hypertension  Gradually improving, will increase Norvasc to 5 mg with dinner  Follow-up 3 to 4 weeks for blood pressure  Dash diet info given   Okay to proceed with cataract surgery      20 Total Minutes spent pre charting (reviewing problem list, meds, any test results, consultant and hospital notes ) and  obtaining present visit history, performing appropriate medical exam/evaluation, counseling and educating the patient (and family), ordering medications ,tests, and procedures as needed, refilling medication(s), placing referral(s) when needed in addition to coordinating care for this patient and documenting in electronic health record

## 2022-03-14 RX ORDER — ATORVASTATIN CALCIUM 10 MG/1
TABLET, FILM COATED ORAL
Qty: 90 TABLET | Refills: 3 | Status: SHIPPED | OUTPATIENT
Start: 2022-03-14

## 2022-03-14 RX ORDER — METFORMIN HYDROCHLORIDE 500 MG/1
TABLET, EXTENDED RELEASE ORAL
Qty: 180 TABLET | Refills: 3 | Status: SHIPPED | OUTPATIENT
Start: 2022-03-14

## 2022-03-14 NOTE — TELEPHONE ENCOUNTER
Medication:   Requested Prescriptions     Pending Prescriptions Disp Refills    metFORMIN (GLUCOPHAGE-XR) 500 MG extended release tablet [Pharmacy Med Name: METFORMIN ER TAB 500MG GP] 180 tablet 3     Sig: TAKE 2 TABLETS DAILY WITH  SUPPER    atorvastatin (LIPITOR) 10 MG tablet [Pharmacy Med Name: ATORVASTATIN TAB 10MG] 90 tablet 3     Sig: TAKE 1 TABLET DAILY       Last Filled:  Metformin 02/26/2021 #180 3rf  Lipitor 02/26/2021 #90 3rf    Patient Phone Number: [phone] (home)     Last appt: 2/21/2022   Next appt: Visit date not found    Last Labs DM:   Lab Results   Component Value Date    LABA1C 6.2 02/11/2022

## 2022-04-13 ENCOUNTER — TELEMEDICINE (OUTPATIENT)
Dept: FAMILY MEDICINE CLINIC | Age: 82
End: 2022-04-13
Payer: MEDICARE

## 2022-04-13 DIAGNOSIS — J20.9 BRONCHITIS WITH BRONCHOSPASM: Primary | ICD-10-CM

## 2022-04-13 PROCEDURE — 99442 PR PHYS/QHP TELEPHONE EVALUATION 11-20 MIN: CPT | Performed by: STUDENT IN AN ORGANIZED HEALTH CARE EDUCATION/TRAINING PROGRAM

## 2022-04-13 RX ORDER — PREDNISONE 20 MG/1
40 TABLET ORAL DAILY
Qty: 14 TABLET | Refills: 0 | Status: SHIPPED | OUTPATIENT
Start: 2022-04-13 | End: 2022-04-20

## 2022-04-13 RX ORDER — ALBUTEROL SULFATE 90 UG/1
2 AEROSOL, METERED RESPIRATORY (INHALATION) EVERY 6 HOURS PRN
Qty: 18 G | Refills: 3 | Status: SHIPPED | OUTPATIENT
Start: 2022-04-13 | End: 2022-09-28 | Stop reason: ALTCHOICE

## 2022-04-13 RX ORDER — DOXYCYCLINE HYCLATE 100 MG
100 TABLET ORAL 2 TIMES DAILY
Qty: 20 TABLET | Refills: 0 | Status: SHIPPED | OUTPATIENT
Start: 2022-04-13 | End: 2022-04-23

## 2022-04-13 RX ORDER — GUAIFENESIN DEXTROMETHORPHAN HYDROBROMIDE ORAL SOLUTION 10; 100 MG/5ML; MG/5ML
10 SOLUTION ORAL EVERY 4 HOURS PRN
Qty: 237 ML | Refills: 0 | Status: SHIPPED | OUTPATIENT
Start: 2022-04-13 | End: 2022-06-07

## 2022-04-13 SDOH — ECONOMIC STABILITY: FOOD INSECURITY: WITHIN THE PAST 12 MONTHS, YOU WORRIED THAT YOUR FOOD WOULD RUN OUT BEFORE YOU GOT MONEY TO BUY MORE.: NEVER TRUE

## 2022-04-13 SDOH — ECONOMIC STABILITY: FOOD INSECURITY: WITHIN THE PAST 12 MONTHS, THE FOOD YOU BOUGHT JUST DIDN'T LAST AND YOU DIDN'T HAVE MONEY TO GET MORE.: NEVER TRUE

## 2022-04-13 ASSESSMENT — PATIENT HEALTH QUESTIONNAIRE - PHQ9
SUM OF ALL RESPONSES TO PHQ QUESTIONS 1-9: 0
SUM OF ALL RESPONSES TO PHQ QUESTIONS 1-9: 0
2. FEELING DOWN, DEPRESSED OR HOPELESS: 0
SUM OF ALL RESPONSES TO PHQ QUESTIONS 1-9: 0
1. LITTLE INTEREST OR PLEASURE IN DOING THINGS: 0
SUM OF ALL RESPONSES TO PHQ9 QUESTIONS 1 & 2: 0
SUM OF ALL RESPONSES TO PHQ QUESTIONS 1-9: 0

## 2022-04-13 ASSESSMENT — SOCIAL DETERMINANTS OF HEALTH (SDOH): HOW HARD IS IT FOR YOU TO PAY FOR THE VERY BASICS LIKE FOOD, HOUSING, MEDICAL CARE, AND HEATING?: NOT HARD AT ALL

## 2022-04-13 NOTE — PROGRESS NOTES
[address] [facility] Note    Date: 4/13/2022      Assessment/Plan:     1. Bronchitis with bronchospasm   doxy, pred burst, albuterol prn, robitussin dM prn  Warning signs    Orders Placed This Encounter   Medications    doxycycline hyclate (VIBRA-TABS) 100 MG tablet     Sig: Take 1 tablet by mouth 2 times daily for 10 days     Dispense:  20 tablet     Refill:  0    predniSONE (DELTASONE) 20 MG tablet     Sig: Take 2 tablets by mouth daily for 7 days     Dispense:  14 tablet     Refill:  0    albuterol sulfate HFA (PROVENTIL HFA) 108 (90 Base) MCG/ACT inhaler     Sig: Inhale 2 puffs into the lungs every 6 hours as needed for Wheezing     Dispense:  18 g     Refill:  3    dextromethorphan-guaiFENesin (ROBITUSSIN-DM)  MG/5ML syrup     Sig: Take 10 mLs by mouth every 4 hours as needed for Cough     Dispense:  237 mL     Refill:  0     Telephone visit. 15 minutes spent talking with patient, reviewing medical records and documenting in chart. No orders of the defined types were placed in this encounter. If any chest pain, shortness of breath, trouble breathing or other concerning symptoms to go to ER. Return if symptoms worsen or fail to improve. Due to the current coronavirus pandemic, this telephone/video visit was insisted, with patient's  (and/or legal guardian's) consent, to reduce the patient's risk of exposure to COVID-19 and provide necessary medical care. The patient was at home while the provider was either at home or at the clinic. Services were provided through a synchronous discussion over the telephone and/or video chat to substitute for in person clinic visit, and coded as such. The patient (and/or legal guardian) has also been advised to contact this office for worsening conditions or problems, and seek emergency medical treatment and/or call 911 if deemed necessary.     Discussed medication(s) risks, benefits, side effects, adverse reactions and interactions with patient. Patient voiced understanding. Subjective/Objective:     Chief Complaint   Patient presents with    Cough     2 weeks        HPI    Cough for 2 weeks  Been doing claritin and flonase without any change  Up 2-3 times a night coughing  Doesn't think has had fevers  Wheezing a lot  Tried cough drops and hot tea  No chest pain or shortness of breath  Helps with little ones  Had COVID before  Took home COVID test and it was negative    Wt Readings from Last 3 Encounters:   02/21/22 194 lb 9.6 oz (88.3 kg)   02/15/22 193 lb (87.5 kg)   11/02/21 194 lb (88 kg)     There is no height or weight on file to calculate BMI.     BP Readings from Last 3 Encounters:   02/21/22 (!) 144/88   02/15/22 (!) 160/100   02/26/21 118/78     The 10-year ASCVD risk score (Tori Gonzalez, et al., 2013) is: 37.7%    Values used to calculate the score:      Age: 80 years      Sex: Female      Is Non- : No      Diabetic: No      Tobacco smoker: No      Systolic Blood Pressure: 979 mmHg      Is BP treated: Yes      HDL Cholesterol: 48 mg/dL      Total Cholesterol: 137 mg/dL    ROS: denies nausea/vomiting, fevers, chills, chest pain, shortness of breath, diarrhea, constipation, blood in the urine or stool         Patient Active Problem List   Diagnosis    Essential hypertension    Pericarditis    Acquired hypothyroidism    Hyperlipidemia    Hx: recurrent pneumonia    Prediabetes    Squamous cell carcinoma of lower leg, left     Past Medical History:   Diagnosis Date    CAD (coronary artery disease)     Hypercholesterolemia     Hypertension     Pericarditis     history of    Vertigo        Past Surgical History:   Procedure Laterality Date    CATARACT REMOVAL Left 2/2012    FRACTURE SURGERY      repair    TONSILLECTOMY         Current Outpatient Medications   Medication Sig Dispense Refill    doxycycline hyclate (VIBRA-TABS) 100 MG tablet Take 1 tablet by mouth 2 times daily for 10 days 20 tablet 0    predniSONE (DELTASONE) 20 MG tablet Take 2 tablets by mouth daily for 7 days 14 tablet 0    albuterol sulfate HFA (PROVENTIL HFA) 108 (90 Base) MCG/ACT inhaler Inhale 2 puffs into the lungs every 6 hours as needed for Wheezing 18 g 3    dextromethorphan-guaiFENesin (ROBITUSSIN-DM)  MG/5ML syrup Take 10 mLs by mouth every 4 hours as needed for Cough 237 mL 0    metFORMIN (GLUCOPHAGE-XR) 500 MG extended release tablet TAKE 2 TABLETS DAILY WITH  SUPPER 180 tablet 3    atorvastatin (LIPITOR) 10 MG tablet TAKE 1 TABLET DAILY 90 tablet 3    amLODIPine (NORVASC) 5 MG tablet Take 1 tablet by mouth daily (before dinner) 90 tablet 1    latanoprost (XALATAN) 0.005 % ophthalmic solution       losartan (COZAAR) 100 MG tablet TAKE 1 TABLET DAILY with breakkfast 90 tablet 1    levothyroxine (SYNTHROID) 75 MCG tablet TAKE 1 TABLET EVERY MORNINGBEFORE BREAKFAST 90 tablet 0    Coenzyme Q-10 100 MG CAPS Take 1 capsule by mouth Daily      calcium citrate-vitamin D (CITRICAL + D) 315-250 MG-UNIT TABS Take 2 tablets by mouth.  aspirin 81 MG tablet Take 81 mg by mouth daily. No current facility-administered medications for this visit.      Allergies   Allergen Reactions    Ceftin [Cefuroxime Axetil]      Diarrhea       Social History     Socioeconomic History    Marital status:      Spouse name: None    Number of children: 2    Years of education: None    Highest education level: None   Occupational History    None   Tobacco Use    Smoking status: Former Smoker     Packs/day: 1.00     Years: 4.00     Pack years: 4.00     Types: Cigarettes     Quit date: 1965     Years since quittin.8    Smokeless tobacco: Never Used    Tobacco comment: stopped using cig many years ago late teens early 19's   Vaping Use    Vaping Use: Never used   Substance and Sexual Activity    Alcohol use: Yes     Comment: seldom    Drug use: No    Sexual activity: None   Other Topics Concern    None   Social History Narrative    None     Social Determinants of Health     Financial Resource Strain: Low Risk     Difficulty of Paying Living Expenses: Not hard at all   Food Insecurity: No Food Insecurity    Worried About Running Out of Food in the Last Year: Never true    Esperanza of Food in the Last Year: Never true   Transportation Needs:     Lack of Transportation (Medical): Not on file    Lack of Transportation (Non-Medical): Not on file   Physical Activity:     Days of Exercise per Week: Not on file    Minutes of Exercise per Session: Not on file   Stress:     Feeling of Stress : Not on file   Social Connections:     Frequency of Communication with Friends and Family: Not on file    Frequency of Social Gatherings with Friends and Family: Not on file    Attends [employer] Services: Not on file    Active Member of [address] [place] [address] or Organizations: Not on file    Attends Club or Organization Meetings: Not on file    Marital Status: Not on file   Intimate Partner Violence:     Fear of Current or Ex-Partner: Not on file    Emotionally Abused: Not on file    Physically Abused: Not on file    Sexually Abused: Not on file   Housing Stability:     Unable to Pay for Housing in the Last Year: Not on file    Number of [place] in the Last Year: Not on file    Unstable Housing in the Last Year: Not on file     Family History   Problem Relation Age of Onset    Dementia Mother     Pancreatic Cancer Father          Vitals: There were no vitals taken for this visit. Physical Exam   There were no vitals taken for this visit.   GEN:  alert and pleasant, in NAD  RR: in NAD over telephone, normal respiratory rate, talking in complete sentences without difficulty  NEURO: Alert oriented to person/place/date and time, normal mood and affect, able to follow commands  No focal changes over telephone    Wade Tillman MD    4/13/2022 2:45 PM    Documentation was done using voice recognition dragon software. Every effort was made to ensure accuracy; however, inadvertent, unintentional computerized transcription errors may be present.

## 2022-05-03 RX ORDER — LEVOTHYROXINE SODIUM 0.07 MG/1
TABLET ORAL
Qty: 90 TABLET | Refills: 3 | Status: SHIPPED | OUTPATIENT
Start: 2022-05-03 | End: 2022-06-24 | Stop reason: SDUPTHER

## 2022-05-13 ENCOUNTER — OFFICE VISIT (OUTPATIENT)
Dept: FAMILY MEDICINE CLINIC | Age: 82
End: 2022-05-13
Payer: MEDICARE

## 2022-05-13 VITALS
OXYGEN SATURATION: 97 % | HEART RATE: 95 BPM | WEIGHT: 182 LBS | DIASTOLIC BLOOD PRESSURE: 88 MMHG | SYSTOLIC BLOOD PRESSURE: 134 MMHG | BODY MASS INDEX: 31.07 KG/M2 | HEIGHT: 64 IN

## 2022-05-13 DIAGNOSIS — E03.9 ACQUIRED HYPOTHYROIDISM: ICD-10-CM

## 2022-05-13 DIAGNOSIS — I10 ESSENTIAL HYPERTENSION: Primary | ICD-10-CM

## 2022-05-13 DIAGNOSIS — R06.02 SOB (SHORTNESS OF BREATH) ON EXERTION: ICD-10-CM

## 2022-05-13 DIAGNOSIS — E78.2 MIXED HYPERLIPIDEMIA: ICD-10-CM

## 2022-05-13 DIAGNOSIS — Z87.01 HX: RECURRENT PNEUMONIA: ICD-10-CM

## 2022-05-13 DIAGNOSIS — Z23 NEED FOR COVID-19 VACCINE: ICD-10-CM

## 2022-05-13 DIAGNOSIS — R06.2 EXPIRATORY WHEEZING: ICD-10-CM

## 2022-05-13 PROCEDURE — G8417 CALC BMI ABV UP PARAM F/U: HCPCS | Performed by: FAMILY MEDICINE

## 2022-05-13 PROCEDURE — 4040F PNEUMOC VAC/ADMIN/RCVD: CPT | Performed by: FAMILY MEDICINE

## 2022-05-13 PROCEDURE — G8399 PT W/DXA RESULTS DOCUMENT: HCPCS | Performed by: FAMILY MEDICINE

## 2022-05-13 PROCEDURE — 1123F ACP DISCUSS/DSCN MKR DOCD: CPT | Performed by: FAMILY MEDICINE

## 2022-05-13 PROCEDURE — 99214 OFFICE O/P EST MOD 30 MIN: CPT | Performed by: FAMILY MEDICINE

## 2022-05-13 PROCEDURE — 1036F TOBACCO NON-USER: CPT | Performed by: FAMILY MEDICINE

## 2022-05-13 PROCEDURE — 1090F PRES/ABSN URINE INCON ASSESS: CPT | Performed by: FAMILY MEDICINE

## 2022-05-13 PROCEDURE — G8427 DOCREV CUR MEDS BY ELIG CLIN: HCPCS | Performed by: FAMILY MEDICINE

## 2022-05-13 RX ORDER — OLMESARTAN MEDOXOMIL AND HYDROCHLOROTHIAZIDE 20/12.5 20; 12.5 MG/1; MG/1
1 TABLET ORAL DAILY
Qty: 90 TABLET | Refills: 1 | Status: SHIPPED | OUTPATIENT
Start: 2022-05-13 | End: 2022-05-16

## 2022-05-13 RX ORDER — FLUTICASONE PROPIONATE AND SALMETEROL 113; 14 UG/1; UG/1
POWDER, METERED RESPIRATORY (INHALATION)
Qty: 1 EACH | Refills: 0 | Status: SHIPPED | OUTPATIENT
Start: 2022-05-13 | End: 2022-07-15

## 2022-05-13 ASSESSMENT — PATIENT HEALTH QUESTIONNAIRE - PHQ9
SUM OF ALL RESPONSES TO PHQ QUESTIONS 1-9: 0
2. FEELING DOWN, DEPRESSED OR HOPELESS: 0
SUM OF ALL RESPONSES TO PHQ9 QUESTIONS 1 & 2: 0
1. LITTLE INTEREST OR PLEASURE IN DOING THINGS: 0
SUM OF ALL RESPONSES TO PHQ QUESTIONS 1-9: 0

## 2022-05-16 RX ORDER — OLMESARTAN MEDOXOMIL, AMLODIPINE AND HYDROCHLOROTHIAZIDE TABLET 20/5/12.5 MG 20; 5; 12.5 MG/1; MG/1; MG/1
TABLET ORAL
Qty: 90 TABLET | Refills: 3 | Status: SHIPPED | OUTPATIENT
Start: 2022-05-16 | End: 2022-09-28 | Stop reason: ALTCHOICE

## 2022-05-27 ENCOUNTER — HOSPITAL ENCOUNTER (OUTPATIENT)
Dept: CT IMAGING | Age: 82
Discharge: HOME | End: 2022-05-27
Payer: MEDICARE

## 2022-05-27 DIAGNOSIS — R06.02 SOB (SHORTNESS OF BREATH) ON EXERTION: ICD-10-CM

## 2022-05-27 DIAGNOSIS — R06.2 EXPIRATORY WHEEZING: ICD-10-CM

## 2022-05-27 PROCEDURE — 71250 CT THORAX DX C-: CPT

## 2022-05-31 ENCOUNTER — TELEPHONE (OUTPATIENT)
Dept: FAMILY MEDICINE CLINIC | Age: 82
End: 2022-05-31

## 2022-05-31 ENCOUNTER — TELEMEDICINE (OUTPATIENT)
Dept: FAMILY MEDICINE CLINIC | Age: 82
End: 2022-05-31
Payer: MEDICARE

## 2022-05-31 DIAGNOSIS — N28.9 RENAL LESION: ICD-10-CM

## 2022-05-31 DIAGNOSIS — N28.89 RENAL MASS, RIGHT: Primary | ICD-10-CM

## 2022-05-31 DIAGNOSIS — Z01.818 PREOP EXAMINATION: ICD-10-CM

## 2022-05-31 DIAGNOSIS — R93.89 ABNORMAL CT OF THE CHEST: Primary | ICD-10-CM

## 2022-05-31 DIAGNOSIS — Z71.2 ENCOUNTER TO DISCUSS TEST RESULTS: ICD-10-CM

## 2022-05-31 PROCEDURE — 1123F ACP DISCUSS/DSCN MKR DOCD: CPT | Performed by: FAMILY MEDICINE

## 2022-05-31 PROCEDURE — 99213 OFFICE O/P EST LOW 20 MIN: CPT | Performed by: FAMILY MEDICINE

## 2022-05-31 PROCEDURE — G8427 DOCREV CUR MEDS BY ELIG CLIN: HCPCS | Performed by: FAMILY MEDICINE

## 2022-05-31 PROCEDURE — 1090F PRES/ABSN URINE INCON ASSESS: CPT | Performed by: FAMILY MEDICINE

## 2022-05-31 PROCEDURE — G8399 PT W/DXA RESULTS DOCUMENT: HCPCS | Performed by: FAMILY MEDICINE

## 2022-05-31 NOTE — PROGRESS NOTES
Stephanie Redman is a 80 y.o. female. HPI:  Stephanie Redman, was evaluated through a synchronous (real-time) audio-video encounter. The patient (or guardian if applicable) is aware that this is a billable service, which includes applicable co-pays. This Virtual Visit was conducted with patient's (and/or legal guardian's) consent. The visit was conducted pursuant to the emergency declaration under the [facility], [address] [place] [address] and the [employer] Act. Patient identification was verified, and a caregiver was present when appropriate. The patient was located in a state where the provider was licensed to provide care. --Haydee Riley MD on 5/31/2022 at 12:19 PM    An electronic signature was used to authenticate this note. Video visit to discuss results of chest CT  Results reviewed with patient in detail    She thinks inhaler may be helping some . ..she has been on it for 2 weeks    recommend CT abdomen and pelvis for right kidney mass       Wt Readings from Last 3 Encounters:   05/13/22 182 lb (82.6 kg)   02/21/22 194 lb 9.6 oz (88.3 kg)   02/15/22 193 lb (87.5 kg)       REVIEW OF SYSTEMS:   CONSTITUTIONAL: See history of present illness,   Weight noted   HEENT: No new vision difficulties or ringing in the ears. RESPIRATORY: Shortness of breath and cough, may be some improvement on inhaler  CARDIOVASCULAR: no CP, palpitations or SOB with exertion  GI: No nausea, vomiting, diarrhea, constipation, abdominal pain or changes in bowel habits. : No urinary frequency, urgency, incontinence hematuria or dysuria. SKIN: No cyanosis or skin lesions. MUSCULOSKELETAL: No new muscle or joint pain. NEUROLOGICAL: No syncope or TIA-like symptoms.    PSYCHIATRIC: No anxiety, insomnia or depression     Allergies   Allergen Reactions    Ceftin [Cefuroxime Axetil]      Diarrhea       Prior to Visit Medications Medication Sig Taking? Authorizing Provider   olmesartan-amLODIPine-HCTZ 20-5-12.5 MG TABS 1 tablet p.o. daily with dinner Yes Evgeny Grayson MD   Fluticasone-Salmeterol,sensor, (AIRDUO DIGIHALER) 113-14 MCG/ACT AEPB 1 puff twice a day Yes Evgeny Grayson MD   levothyroxine (SYNTHROID) 75 MCG tablet TAKE 1 TABLET EVERY MORNINGBEFORE BREAKFAST Yes Evgeny Grayson MD   albuterol sulfate HFA (PROVENTIL HFA) 108 (90 Base) MCG/ACT inhaler Inhale 2 puffs into the lungs every 6 hours as needed for Wheezing Yes Anuel Cheung MD   dextromethorphan-guaiFENesin (ROBITUSSIN-DM)  MG/5ML syrup Take 10 mLs by mouth every 4 hours as needed for Cough Yes Anuel Cheung MD   metFORMIN (GLUCOPHAGE-XR) 500 MG extended release tablet TAKE 2 TABLETS DAILY WITH  SUPPER Yes Evgeny Grayson MD   atorvastatin (LIPITOR) 10 MG tablet TAKE 1 TABLET DAILY Yes Evgeny Grayson MD   amLODIPine (NORVASC) 5 MG tablet Take 1 tablet by mouth daily (before dinner) Yes Evgeny Grayson MD   latanoprost (XALATAN) 0.005 % ophthalmic solution  Yes Historical Provider, MD   Coenzyme Q-10 100 MG CAPS Take 1 capsule by mouth Daily Yes Historical Provider, MD   calcium citrate-vitamin D (CITRICAL + D) 315-250 MG-UNIT TABS Take 2 tablets by mouth. Yes Historical Provider, MD   aspirin 81 MG tablet Take 81 mg by mouth daily.    Yes Historical Provider, MD       Past Medical History:   Diagnosis Date    CAD (coronary artery disease)     Hypercholesterolemia     Hypertension     Pericarditis     history of    Vertigo        Social History     Tobacco Use    Smoking status: Former Smoker     Packs/day: 1.00     Years: 4.00     Pack years: 4.00     Types: Cigarettes     Quit date: 1965     Years since quittin.9    Smokeless tobacco: Never Used    Tobacco comment: stopped using cig many years ago late teens early 19's   Substance Use Topics    Alcohol use: Yes     Comment: seldom       Family History   Problem Relation Age of Onset  Dementia Mother     Pancreatic Cancer Father        OBJECTIVE:  There were no vitals taken for this visit. GEN:  alert and pleasant , in NAD  HEENT:  NCAT, EOM intact, no facial asymmetry,   NECK:  good range of motion  RR: in NAD over video, normal respiratory rate   EXT: No rash or edema observed over video  NEURO: Alert oriented to person/place/date and time , normal mood and affect, able to follow commands ,  No focal changes over video     ASSESSMENT/PLAN:  1. Abnormal CT of the chest  Refer to pulmonary  Recommend she do PFTs  Not sure inhaler is working/air mirza  - Maxi Hubbard MD, Pulmonary, [facility]    2. Renal lesion  - CT ABDOMEN PELVIS W WO CONTRAST Additional Contrast? Radiologist Recommendation; Future    3.  Encounter to discuss test results  Chest CT results reviewed with patient indeed      22 Total Minutes spent pre charting (reviewing problem list, reviewing health maintenance items , meds, any test results, consultant and hospital notes ) and  obtaining present visit history, performing appropriate medical exam/evaluation, counseling and educating the patient (and family), ordering medications ,tests, and procedures as needed, refilling medication(s), placing referral(s) when needed in addition to coordinating care for this patient and documenting in electronic health record

## 2022-05-31 NOTE — TELEPHONE ENCOUNTER
Korey Jara with [facility] Scheduling calls in to request a stat Order be placed for a BUN/Creatinine to have it drawn before patient's CAT scan on 6/14/2022.

## 2022-06-06 NOTE — PROGRESS NOTES
Medicare Annual Wellness Visit    Summer Fields is here for Medicare AWV    Assessment & Plan   Medicare annual wellness visit, subsequent  Healthy diet, stay active    Mixed simple and mucopurulent chronic bronchitis ([address]  To see pulmonary and review chest CT, also getting PFT    Essential hypertension  Stable, continue medication    Right kidney mass  CT scan next week    Pulmonary nodule, right  Repeat chest CT 3-months    Cerumen impaction left  Debrox drops and irrigate in the future as needed      Recommendations for Preventive Services Due: see orders and patient instructions/AVS.  Recommended screening schedule for the next 5-10 years is provided to the patient in written form: see Patient Instructions/AVS.     Return for Medicare Annual Wellness Visit in 1 year. Subjective   To see     Patient's complete Health Risk Assessment and screening values have been reviewed and are found in Flowsheets. The following problems were reviewed today and where indicated follow up appointments were made and/or referrals ordered. Positive Risk Factor Screenings with Interventions:              Health Habits/Nutrition:     Physical Activity: Inactive    Days of Exercise per Week: 0 days    Minutes of Exercise per Session: 0 min     Have you lost any weight without trying in the past 3 months?: No     Have you seen the dentist within the past year?: Yes    Health Habits/Nutrition Interventions:  · seeing dentist   · To eat healthy and stay active             Objective   Vitals:    06/07/22 1021   BP: 110/80   Pulse: 92   SpO2: 94%   Weight: 193 lb (87.5 kg)      Body mass index is 33.13 kg/m².         General Appearance: alert and oriented to person, place and time, well developed and well- nourished, in no acute distress  Skin: warm and dry, no rash or erythema  Head: normocephalic and atraumatic  Eyes: pupils equal, round, and reactive to light, extraocular eye movements intact, conjunctivae normal  ENT: Cerumen impaction left, right TM clear, external ear and ear canal normal bilaterally, nose without deformity, nasal mucosa and turbinates normal without polyps  Neck: supple and non-tender without mass, no thyromegaly or thyroid nodules, no cervical lymphadenopathy  Pulmonary/Chest: clear to auscultation bilaterally- no wheezes, rales or rhonchi, normal air movement, no respiratory distress  Cardiovascular: normal rate, regular rhythm, normal S1 and S2, no murmurs, rubs, clicks, or gallops, distal pulses intact, no carotid bruits  Abdomen: soft, non-tender, non-distended, normal bowel sounds, no masses or organomegaly  Breast: appear normal, no suspicious masses, no skin or nipple changes or axillary nodes  Extremities: no cyanosis, clubbing or edema  Musculoskeletal: normal range of motion, no joint swelling, deformity or tenderness  Neurologic: reflexes normal and symmetric, no cranial nerve deficit, gait, coordination and speech normal       Allergies   Allergen Reactions    Ceftin [Cefuroxime Axetil]      Diarrhea     Prior to Visit Medications    Medication Sig Taking?  Authorizing Provider   Fluticasone furoate-vilanterol (BREO ELLIPTA) 200-25 MCG/INH AEPB inhaler Inhale 1 puff into the lungs daily Yes Neymar Jacques MD   carbamide peroxide (DEBROX) 6.5 % otic solution Place 5 drops into the left ear 2 times daily Yes Neymar Jacques MD   olmesartan-amLODIPine-HCTZ 20-5-12.5 MG TABS 1 tablet p.o. daily with dinner Yes Neymar Jacques MD   Fluticasone-Salmeterol,sensor, (AIRDUO DIGIHALER) 113-14 MCG/ACT AEPB 1 puff twice a day Yes Neymar Jacques MD   levothyroxine (SYNTHROID) 75 MCG tablet TAKE 1 TABLET EVERY MORNINGBEFORE BREAKFAST Yes Neymar Jacques MD   albuterol sulfate HFA (PROVENTIL HFA) 108 (90 Base) MCG/ACT inhaler Inhale 2 puffs into the lungs every 6 hours as needed for Wheezing Yes Carolynn Merritt MD   metFORMIN (GLUCOPHAGE-XR) 500 MG extended release tablet TAKE 2 TABLETS DAILY WITH  SUPPER Yes Jayjay Sotomayor MD   atorvastatin (LIPITOR) 10 MG tablet TAKE 1 TABLET DAILY Yes Jayjay Sotomayor MD   latanoprost (XALATAN) 0.005 % ophthalmic solution  Yes Historical Provider, MD   Coenzyme Q-10 100 MG CAPS Take 1 capsule by mouth Daily Yes Historical Provider, MD   calcium citrate-vitamin D (CITRICAL + D) 315-250 MG-UNIT TABS Take 2 tablets by mouth. Yes Historical Provider, MD   aspirin 81 MG tablet Take 81 mg by mouth daily.    Yes Historical Provider, MD Guo (Including outside providers/suppliers regularly involved in providing care):   Patient Care Team:  Jayjay Sotomayor MD as PCP - General (Family Medicine)  Jayjay Sotomayor MD as PCP - [facility] Empaneled Provider  Vitaly Hendrix MD as Consulting Physician (Cardiology)     Reviewed and updated this visit:  Tobacco  Allergies  Meds  Problems  Med Hx  Surg Hx  Soc Hx  Fam Hx

## 2022-06-07 ENCOUNTER — OFFICE VISIT (OUTPATIENT)
Dept: FAMILY MEDICINE CLINIC | Age: 82
End: 2022-06-07
Payer: MEDICARE

## 2022-06-07 VITALS
SYSTOLIC BLOOD PRESSURE: 110 MMHG | OXYGEN SATURATION: 94 % | BODY MASS INDEX: 33.13 KG/M2 | DIASTOLIC BLOOD PRESSURE: 80 MMHG | WEIGHT: 193 LBS | HEART RATE: 92 BPM

## 2022-06-07 DIAGNOSIS — H61.22 IMPACTED CERUMEN OF LEFT EAR: ICD-10-CM

## 2022-06-07 DIAGNOSIS — Z00.00 MEDICARE ANNUAL WELLNESS VISIT, SUBSEQUENT: Primary | ICD-10-CM

## 2022-06-07 DIAGNOSIS — R91.1 PULMONARY NODULE, RIGHT: ICD-10-CM

## 2022-06-07 DIAGNOSIS — R05.3 CHRONIC COUGH: ICD-10-CM

## 2022-06-07 DIAGNOSIS — N28.89 RIGHT KIDNEY MASS: ICD-10-CM

## 2022-06-07 DIAGNOSIS — J41.8 MIXED SIMPLE AND MUCOPURULENT CHRONIC BRONCHITIS (HCC): ICD-10-CM

## 2022-06-07 DIAGNOSIS — I10 ESSENTIAL HYPERTENSION: ICD-10-CM

## 2022-06-07 PROBLEM — J44.9 CHRONIC OBSTRUCTIVE PULMONARY DISEASE, UNSPECIFIED (HCC): Status: ACTIVE | Noted: 2022-06-07

## 2022-06-07 LAB
BILIRUBIN, POC: NORMAL
BLOOD URINE, POC: NORMAL
CLARITY, POC: CLEAR
COLOR, POC: YELLOW
GLUCOSE URINE, POC: NORMAL
KETONES, POC: NORMAL
LEUKOCYTE EST, POC: NORMAL
NITRITE, POC: NORMAL
PH, POC: 5
PROTEIN, POC: NORMAL
SPECIFIC GRAVITY, POC: 1.02
UROBILINOGEN, POC: 0.2

## 2022-06-07 PROCEDURE — 1123F ACP DISCUSS/DSCN MKR DOCD: CPT | Performed by: FAMILY MEDICINE

## 2022-06-07 PROCEDURE — 81002 URINALYSIS NONAUTO W/O SCOPE: CPT | Performed by: FAMILY MEDICINE

## 2022-06-07 PROCEDURE — G0439 PPPS, SUBSEQ VISIT: HCPCS | Performed by: FAMILY MEDICINE

## 2022-06-07 RX ORDER — FLUTICASONE FUROATE AND VILANTEROL 200; 25 UG/1; UG/1
1 POWDER RESPIRATORY (INHALATION) DAILY
Qty: 1 EACH | Refills: 2 | Status: SHIPPED | OUTPATIENT
Start: 2022-06-07 | End: 2022-07-15

## 2022-06-07 ASSESSMENT — PATIENT HEALTH QUESTIONNAIRE - PHQ9
2. FEELING DOWN, DEPRESSED OR HOPELESS: 0
SUM OF ALL RESPONSES TO PHQ QUESTIONS 1-9: 0
SUM OF ALL RESPONSES TO PHQ9 QUESTIONS 1 & 2: 0
1. LITTLE INTEREST OR PLEASURE IN DOING THINGS: 0
SUM OF ALL RESPONSES TO PHQ QUESTIONS 1-9: 0

## 2022-06-07 ASSESSMENT — LIFESTYLE VARIABLES: HOW OFTEN DO YOU HAVE A DRINK CONTAINING ALCOHOL: NEVER

## 2022-06-07 NOTE — PATIENT INSTRUCTIONS
Personalized Preventive Plan for Meg Hampton - 6/7/2022  Medicare offers a range of preventive health benefits. Some of the tests and screenings are paid in full while other may be subject to a deductible, co-insurance, and/or copay. Some of these benefits include a comprehensive review of your medical history including lifestyle, illnesses that may run in your family, and various assessments and screenings as appropriate. After reviewing your medical record and screening and assessments performed today your provider may have ordered immunizations, labs, imaging, and/or referrals for you. A list of these orders (if applicable) as well as your Preventive Care list are included within your After Visit Summary for your review. Other Preventive Recommendations:    · A preventive eye exam performed by an eye specialist is recommended every 1-2 years to screen for glaucoma; cataracts, macular degeneration, and other eye disorders. · A preventive dental visit is recommended every 6 months. · Try to get at least 150 minutes of exercise per week or 10,000 steps per day on a pedometer . · Order or download the FREE \"Exercise & Physical Activity: Your Everyday Guide\" from [employer]. Call 0-173.122.6802 or search [employer] online. · You need 8835-3052 mg of calcium and 6074-3633 IU of vitamin D per day. It is possible to meet your calcium requirement with diet alone, but a vitamin D supplement is usually necessary to meet this goal.  · When exposed to the sun, use a sunscreen that protects against both UVA and UVB radiation with an SPF of 30 or greater. Reapply every 2 to 3 hours or after sweating, drying off with a towel, or swimming. · Always wear a seat belt when traveling in a car. Always wear a helmet when riding a bicycle or motorcycle.

## 2022-06-08 RX ORDER — FLUTICASONE PROPIONATE AND SALMETEROL 113; 14 UG/1; UG/1
POWDER, METERED RESPIRATORY (INHALATION)
Qty: 1 EACH | Refills: 0 | COMMUNITY
Start: 2022-06-08 | End: 2022-07-15

## 2022-06-14 ENCOUNTER — HOSPITAL ENCOUNTER (OUTPATIENT)
Dept: CT IMAGING | Age: 82
Discharge: HOME | End: 2022-06-14
Payer: MEDICARE

## 2022-06-14 ENCOUNTER — HOSPITAL ENCOUNTER (OUTPATIENT)
Dept: PULMONOLOGY | Age: 82
Discharge: HOME | End: 2022-06-14
Payer: MEDICARE

## 2022-06-14 VITALS — RESPIRATION RATE: 18 BRPM | OXYGEN SATURATION: 96 % | HEART RATE: 91 BPM

## 2022-06-14 DIAGNOSIS — R06.02 SOB (SHORTNESS OF BREATH) ON EXERTION: ICD-10-CM

## 2022-06-14 DIAGNOSIS — N28.9 RENAL LESION: ICD-10-CM

## 2022-06-14 LAB
BUN BLDV-MCNC: 27 MG/DL (ref 7–20)
CREAT SERPL-MCNC: 1 MG/DL (ref 0.6–1.2)
DLCO %PRED: 61 %
DLCO PRED: NORMAL
DLCO/VA %PRED: NORMAL
DLCO/VA PRED: NORMAL
DLCO/VA: NORMAL
DLCO: NORMAL
EXPIRATORY TIME-POST: NORMAL
EXPIRATORY TIME: NORMAL
FEF 25-75% %CHNG: NORMAL
FEF 25-75% %PRED-POST: NORMAL
FEF 25-75% %PRED-PRE: NORMAL
FEF 25-75% PRED: NORMAL
FEF 25-75%-POST: NORMAL
FEF 25-75%-PRE: NORMAL
FEV1 %PRED-POST: 77 %
FEV1 %PRED-PRE: 76 %
FEV1 PRED: NORMAL
FEV1-POST: NORMAL
FEV1-PRE: NORMAL
FEV1/FVC %PRED-POST: NORMAL
FEV1/FVC %PRED-PRE: NORMAL
FEV1/FVC PRED: NORMAL
FEV1/FVC-POST: 83 %
FEV1/FVC-PRE: 79 %
FVC %PRED-POST: NORMAL
FVC %PRED-PRE: NORMAL
FVC PRED: NORMAL
FVC-POST: NORMAL
FVC-PRE: NORMAL
GAW %PRED: NORMAL
GAW PRED: NORMAL
GAW: NORMAL
GFR AFRICAN AMERICAN: >60
GFR NON-AFRICAN AMERICAN: 53
IC %PRED: NORMAL
IC PRED: NORMAL
IC: NORMAL
MEP: NORMAL
MIP: NORMAL
MVV %PRED-PRE: NORMAL
MVV PRED: NORMAL
MVV-PRE: NORMAL
PEF %PRED-POST: NORMAL
PEF %PRED-PRE: NORMAL
PEF PRED: NORMAL
PEF%CHNG: NORMAL
PEF-POST: NORMAL
PEF-PRE: NORMAL
RAW %PRED: NORMAL
RAW PRED: NORMAL
RAW: NORMAL
RV %PRED: NORMAL
RV PRED: NORMAL
RV: NORMAL
SVC %PRED: NORMAL
SVC PRED: NORMAL
SVC: NORMAL
TLC %PRED: 70 %
TLC PRED: NORMAL
TLC: NORMAL
VA %PRED: NORMAL
VA PRED: NORMAL
VA: NORMAL
VTG %PRED: NORMAL
VTG PRED: NORMAL
VTG: NORMAL

## 2022-06-14 PROCEDURE — 84520 ASSAY OF UREA NITROGEN: CPT

## 2022-06-14 PROCEDURE — 36415 COLL VENOUS BLD VENIPUNCTURE: CPT

## 2022-06-14 PROCEDURE — 6360000004 HC RX CONTRAST MEDICATION: Performed by: INTERNAL MEDICINE

## 2022-06-14 PROCEDURE — 94760 N-INVAS EAR/PLS OXIMETRY 1: CPT

## 2022-06-14 PROCEDURE — 74178 CT ABD&PLV WO CNTR FLWD CNTR: CPT

## 2022-06-14 PROCEDURE — 94729 DIFFUSING CAPACITY: CPT

## 2022-06-14 PROCEDURE — 6370000000 HC RX 637 (ALT 250 FOR IP): Performed by: FAMILY MEDICINE

## 2022-06-14 PROCEDURE — 94060 EVALUATION OF WHEEZING: CPT

## 2022-06-14 PROCEDURE — 94726 PLETHYSMOGRAPHY LUNG VOLUMES: CPT

## 2022-06-14 PROCEDURE — 94200 LUNG FUNCTION TEST (MBC/MVV): CPT

## 2022-06-14 PROCEDURE — 82565 ASSAY OF CREATININE: CPT

## 2022-06-14 RX ORDER — ALBUTEROL SULFATE 90 UG/1
2 AEROSOL, METERED RESPIRATORY (INHALATION) ONCE
Status: COMPLETED | OUTPATIENT
Start: 2022-06-14 | End: 2022-06-14

## 2022-06-14 RX ADMIN — IOPAMIDOL 75 ML: 755 INJECTION, SOLUTION INTRAVENOUS at 14:27

## 2022-06-14 RX ADMIN — Medication 2 PUFF: at 15:23

## 2022-06-14 ASSESSMENT — PULMONARY FUNCTION TESTS
FEV1/FVC_POST: 83
FEV1_PERCENT_PREDICTED_POST: 77
FEV1_PERCENT_PREDICTED_PRE: 76
FEV1/FVC_PRE: 79

## 2022-06-18 NOTE — PROCEDURES
[facility]                     [facility], [address]                               PULMONARY FUNCTION    PATIENT NAME: Raymundo Garza                   :        1940  MED REC NO:   [id number]                          ROOM:  ACCOUNT NO:   [de-identified]                           ADMIT DATE: 2022  PROVIDER:     Kemal Alberto MD    DATE OF PROCEDURE:  2022    Spirometry reveals decreased FVC at 1.94 liters, which is 71% predicted. FEV1 is normal at 76% predicted. FEV1/FVC ratio is normal.  Expiratory  flow rates are normal.  There is no significant change after inhaled  bronchodilators. Lung volumes reveal decreased total lung capacity at  70% predicted, residual volume is decreased at 58% predicted, diffusion  capacity is at the lower limits of normal.    IMPRESSION:  Mild to moderate restrictive lung disease.         Lamonte Paz MD    D: 2022 16:27:14       T: 2022 0:17:55     [id number]  Job#: [id number]     Doc#: [id number]    CC:

## 2022-06-24 ENCOUNTER — NURSE TRIAGE (OUTPATIENT)
Dept: OTHER | Facility: CLINIC | Age: 82
End: 2022-06-24

## 2022-06-24 ENCOUNTER — OFFICE VISIT (OUTPATIENT)
Dept: FAMILY MEDICINE CLINIC | Age: 82
End: 2022-06-24
Payer: MEDICARE

## 2022-06-24 VITALS
OXYGEN SATURATION: 95 % | HEIGHT: 64 IN | DIASTOLIC BLOOD PRESSURE: 78 MMHG | BODY MASS INDEX: 33.29 KG/M2 | WEIGHT: 195 LBS | SYSTOLIC BLOOD PRESSURE: 132 MMHG | HEART RATE: 107 BPM

## 2022-06-24 DIAGNOSIS — L08.9 WOUND INFECTION: Primary | ICD-10-CM

## 2022-06-24 DIAGNOSIS — T14.8XXA WOUND INFECTION: Primary | ICD-10-CM

## 2022-06-24 PROCEDURE — 1123F ACP DISCUSS/DSCN MKR DOCD: CPT | Performed by: FAMILY MEDICINE

## 2022-06-24 PROCEDURE — G8417 CALC BMI ABV UP PARAM F/U: HCPCS | Performed by: FAMILY MEDICINE

## 2022-06-24 PROCEDURE — G8427 DOCREV CUR MEDS BY ELIG CLIN: HCPCS | Performed by: FAMILY MEDICINE

## 2022-06-24 PROCEDURE — 99213 OFFICE O/P EST LOW 20 MIN: CPT | Performed by: FAMILY MEDICINE

## 2022-06-24 PROCEDURE — G8399 PT W/DXA RESULTS DOCUMENT: HCPCS | Performed by: FAMILY MEDICINE

## 2022-06-24 PROCEDURE — 1090F PRES/ABSN URINE INCON ASSESS: CPT | Performed by: FAMILY MEDICINE

## 2022-06-24 PROCEDURE — 1036F TOBACCO NON-USER: CPT | Performed by: FAMILY MEDICINE

## 2022-06-24 RX ORDER — SULFAMETHOXAZOLE AND TRIMETHOPRIM 800; 160 MG/1; MG/1
1 TABLET ORAL 2 TIMES DAILY
Qty: 20 TABLET | Refills: 0 | Status: SHIPPED | OUTPATIENT
Start: 2022-06-24 | End: 2022-07-04

## 2022-06-24 ASSESSMENT — PATIENT HEALTH QUESTIONNAIRE - PHQ9
DEPRESSION UNABLE TO ASSESS: FUNCTIONAL CAPACITY MOTIVATION LIMITS ACCURACY
SUM OF ALL RESPONSES TO PHQ QUESTIONS 1-9: 0
SUM OF ALL RESPONSES TO PHQ QUESTIONS 1-9: 0
SUM OF ALL RESPONSES TO PHQ9 QUESTIONS 1 & 2: 0
1. LITTLE INTEREST OR PLEASURE IN DOING THINGS: 0
SUM OF ALL RESPONSES TO PHQ QUESTIONS 1-9: 0
SUM OF ALL RESPONSES TO PHQ QUESTIONS 1-9: 0
2. FEELING DOWN, DEPRESSED OR HOPELESS: 0

## 2022-06-24 NOTE — TELEPHONE ENCOUNTER
Received call from Krista Brice at [facility] with Red Flag Complaint. Subjective: Caller states \"I poked myself in the R ankle, on the outside surface. It is swollen some, not really red, not warm to the touch, but is very painful to the touch. I can't touch or put any pressure on the wound - such as laying over on my R side. \"     Onset: 3 days ago; sudden    Pain Severity: 7/10; piercing; intermittent    Temperature: no fever    What has been tried: neosporin    Recommended disposition: See PCP within 24 Hours    Care advice provided, patient verbalizes understanding; denies any other questions or concerns; instructed to call back for any new or worsening symptoms. Silas, Service Expert, to work at getting an appt for pt. Attention Provider: Thank you for allowing me to participate in the care of your patient. The patient was connected to triage in response to information provided to the ECC/PSC. Please do not respond through this encounter as the response is not directed to a shared pool.       Reason for Disposition   [1] Looks infected (spreading redness, pus) AND [2] no fever    Protocols used: CUTS AND LACERATIONS-ADULT-

## 2022-06-24 NOTE — PROGRESS NOTES
[address] Note    Date: 6/24/2022                                               Carolee Mendoza:     Chief Complaint   Patient presents with    Puncture Wound     right leg / poked with a branch while doing yard work        HPI   Pt poked her R lower leg with a branch 4 days ago in the yard. Washed it with soap and water. It's getting more swollen and red. No fever, no vomiting.          Patient Active Problem List    Diagnosis Date Noted    Chronic obstructive pulmonary disease, unspecified 06/07/2022    Need for COVID-19 vaccine 05/13/2022    Squamous cell carcinoma of lower leg, left 11/16/2021    Prediabetes 08/19/2014    Acquired hypothyroidism 07/29/2014    Hyperlipidemia 07/29/2014    Hx: recurrent pneumonia 07/29/2014    Essential hypertension 08/31/2011    Pericarditis 08/31/2011       Past Medical History:   Diagnosis Date    CAD (coronary artery disease)     Hypercholesterolemia     Hypertension     Pericarditis     history of    Vertigo        Current Outpatient Medications   Medication Sig Dispense Refill    sulfamethoxazole-trimethoprim (BACTRIM DS;SEPTRA DS) 800-160 MG per tablet Take 1 tablet by mouth 2 times daily for 10 days 20 tablet 0    Fluticasone-Salmeterol,sensor, (AIRDUO DIGIHALER) 113-14 MCG/ACT AEPB 1 puff twice a day 1 each 0    carbamide peroxide (DEBROX) 6.5 % otic solution Place 5 drops into the left ear 2 times daily 15 mL 0    olmesartan-amLODIPine-HCTZ 20-5-12.5 MG TABS 1 tablet p.o. daily with dinner 90 tablet 3    Fluticasone-Salmeterol,sensor, (AIRDUO DIGIHALER) 113-14 MCG/ACT AEPB 1 puff twice a day 1 each 0    albuterol sulfate HFA (PROVENTIL HFA) 108 (90 Base) MCG/ACT inhaler Inhale 2 puffs into the lungs every 6 hours as needed for Wheezing 18 g 3    metFORMIN (GLUCOPHAGE-XR) 500 MG extended release tablet TAKE 2 TABLETS DAILY WITH  SUPPER 180 tablet 3    atorvastatin (LIPITOR) 10 MG tablet TAKE 1 TABLET DAILY 90 tablet 3    latanoprost (XALATAN) 0.005 % ophthalmic solution       Coenzyme Q-10 100 MG CAPS Take 1 capsule by mouth Daily      calcium citrate-vitamin D (CITRICAL + D) 315-250 MG-UNIT TABS Take 2 tablets by mouth.  aspirin 81 MG tablet Take 81 mg by mouth daily.  Fluticasone furoate-vilanterol (BREO ELLIPTA) 200-25 MCG/INH AEPB inhaler Inhale 1 puff into the lungs daily (Patient not taking: Reported on 6/24/2022) 1 each 2     No current facility-administered medications for this visit. Allergies   Allergen Reactions    Ceftin [Cefuroxime Axetil]      Diarrhea       Review of Systems   No SOB    Vitals:  /78   Pulse (!) 107   Ht 5' 4\" (1.626 m)   Wt 195 lb (88.5 kg)   SpO2 95%   BMI 33.47 kg/m²     Wt Readings from Last 3 Encounters:   06/24/22 195 lb (88.5 kg)   06/07/22 193 lb (87.5 kg)   05/13/22 182 lb (82.6 kg)        Physical Exam   General:  Well-appearing, NAD, alert, non-toxic  HEENT:  Normocephalic, atraumatic. CHEST/LUNGS: No dyspnea  EXTREMETIES: Normal movement of all extremities. No edema. No joint swelling. SKIN:  +6 x4 cm abrasion on R anterior lower leg with surrounding erythema. +yellow granulation tissue. PSYCH:  A+O x 3; normal affect  NEURO:  GCS 15, CN2-12 grossly intact, no focal motor/sensory deficits, normal gait, normal speech      Assessment/Plan     62-year-old female with wound infection/cellulitis. Will treat with Bactrim. Patient advised to go to the emergency room if symptoms worsen in any way. Wound care advice given. Discussed with patient medication/s dosage, instructions, potential S/E, A/R and Drug Interaction  Tylenol or Motrin as needed for pain or fever  Advise to return here if worse or go to nearest ER  Encourage fluids  Pt discharged in stable condition at 15:03      1. Wound infection  -     sulfamethoxazole-trimethoprim (BACTRIM DS;SEPTRA DS) 800-160 MG per tablet;  Take 1 tablet by mouth 2 times daily for 10 days, Disp-20 tablet, R-0Normal No orders of the defined types were placed in this encounter. No follow-ups on file.     Elsa York MD, MD    6/24/2022  3:02 PM

## 2022-07-15 ENCOUNTER — OFFICE VISIT (OUTPATIENT)
Dept: PULMONOLOGY | Age: 82
End: 2022-07-15
Payer: MEDICARE

## 2022-07-15 VITALS
DIASTOLIC BLOOD PRESSURE: 78 MMHG | HEART RATE: 95 BPM | BODY MASS INDEX: 33.12 KG/M2 | OXYGEN SATURATION: 93 % | HEIGHT: 64 IN | WEIGHT: 194 LBS | SYSTOLIC BLOOD PRESSURE: 118 MMHG

## 2022-07-15 DIAGNOSIS — J45.40 MODERATE PERSISTENT REACTIVE AIRWAY DISEASE WITHOUT COMPLICATION: ICD-10-CM

## 2022-07-15 DIAGNOSIS — R91.1 LUNG NODULE: Primary | ICD-10-CM

## 2022-07-15 PROCEDURE — G8417 CALC BMI ABV UP PARAM F/U: HCPCS | Performed by: INTERNAL MEDICINE

## 2022-07-15 PROCEDURE — 99204 OFFICE O/P NEW MOD 45 MIN: CPT | Performed by: INTERNAL MEDICINE

## 2022-07-15 PROCEDURE — 1090F PRES/ABSN URINE INCON ASSESS: CPT | Performed by: INTERNAL MEDICINE

## 2022-07-15 PROCEDURE — G8428 CUR MEDS NOT DOCUMENT: HCPCS | Performed by: INTERNAL MEDICINE

## 2022-07-15 RX ORDER — LEVOTHYROXINE SODIUM 0.07 MG/1
75 TABLET ORAL DAILY
COMMUNITY
End: 2022-09-12 | Stop reason: SDUPTHER

## 2022-07-15 RX ORDER — MONTELUKAST SODIUM 10 MG/1
10 TABLET ORAL DAILY
Qty: 30 TABLET | Refills: 3 | Status: SHIPPED | OUTPATIENT
Start: 2022-07-15 | End: 2022-09-20 | Stop reason: SDUPTHER

## 2022-07-15 RX ORDER — MONTELUKAST SODIUM 10 MG/1
10 TABLET ORAL DAILY
Qty: 30 TABLET | Refills: 3 | Status: SHIPPED | OUTPATIENT
Start: 2022-07-15

## 2022-07-15 ASSESSMENT — ENCOUNTER SYMPTOMS
DIARRHEA: 0
COUGH: 1
APNEA: 0
CHEST TIGHTNESS: 0
SORE THROAT: 0
ABDOMINAL DISTENTION: 0
BLOOD IN STOOL: 0
ANAL BLEEDING: 0
STRIDOR: 0
SINUS PRESSURE: 0
VOICE CHANGE: 0
BACK PAIN: 0
CONSTIPATION: 0
ABDOMINAL PAIN: 0
WHEEZING: 1
CHOKING: 0
RHINORRHEA: 0
SHORTNESS OF BREATH: 1

## 2022-07-15 NOTE — PROGRESS NOTES
Heather Harley    YOB: 1940     Date of Service:  7/15/2022     Chief Complaint   Patient presents with    New Patient     Ref by Dr Dario Biggs of Breath    Cough         HPI patient has been referred over by Dr. Juanjose Moses for an evaluation of shortness of breath and abnormal CT scan. Patient states that she has had a nonproductive cough for several years, but lately has been more short of breath and wheezing particularly since this year. She does have symptoms related to postnasal drip. Denies any chest pain or tightness. Currently treated with Airduo [address], with no clear relief. She was also noted to have an abnormal CT scan. Pulmonary consultation has been requested. No prior history of asthma. Smoked only during her college years. No exposure to pets or birds. History of pneumonia 3-4 times, last episode was in 2004. No occupational dust exposure. History of \"pericarditis\" in 65s. No history of coronary artery disease. Allergies   Allergen Reactions    Ceftin [Cefuroxime Axetil]      Diarrhea     Outpatient Medications Marked as Taking for the 7/15/22 encounter (Office Visit) with Tiana Calderon MD   Medication Sig Dispense Refill    levothyroxine (SYNTHROID) 75 MCG tablet Take 75 mcg by mouth in the morning.      montelukast (SINGULAIR) 10 MG tablet Take 1 tablet by mouth in the morning. 30 tablet 3    montelukast (SINGULAIR) 10 MG tablet Take 1 tablet by mouth in the morning.  30 tablet 3    olmesartan-amLODIPine-HCTZ 20-5-12.5 MG TABS 1 tablet p.o. daily with dinner 90 tablet 3    metFORMIN (GLUCOPHAGE-XR) 500 MG extended release tablet TAKE 2 TABLETS DAILY WITH  SUPPER 180 tablet 3    atorvastatin (LIPITOR) 10 MG tablet TAKE 1 TABLET DAILY 90 tablet 3    latanoprost (XALATAN) 0.005 % ophthalmic solution       Coenzyme Q-10 100 MG CAPS Take 1 capsule by mouth Daily      calcium citrate-vitamin D (CITRICAL + D) 315-250 MG-UNIT TABS Take 2 tablets by mouth. aspirin 81 MG tablet Take 81 mg by mouth daily. Immunization History   Administered Date(s) Administered    COVID-19, PFIZER PURPLE top, DILUTE for use, (age 15 y+), 30mcg/0.3mL 01/31/2021, 02/22/2021, 11/14/2021    Influenza Virus Vaccine 10/22/2014, 10/22/2014    Influenza, High Dose (Fluzone 65 yrs and older) 12/11/2015, 10/28/2016, 10/28/2016, 10/25/2017, 10/20/2018, 10/06/2019, 11/04/2021, 11/04/2021    Influenza, [place]a [place], adjuvanted, 65 yrs +, IM, PF (Fluad) 10/15/2020    Pneumococcal Conjugate 13-valent ([id number]) 02/13/2015    Pneumococcal Polysaccharide ([id number]) 02/18/2019    Tdap (Boostrix, Adacel) 07/29/2014    Zoster Recombinant (Shingrix) 05/06/2019, 05/13/2019, 09/12/2019       Past Medical History:   Diagnosis Date    CAD (coronary artery disease)     Hypercholesterolemia     Hypertension     Pericarditis     history of    Vertigo      Past Surgical History:   Procedure Laterality Date    CATARACT REMOVAL Left 2/2012    FRACTURE SURGERY      repair    TONSILLECTOMY       Family History   Problem Relation Age of Onset    Dementia Mother     Pancreatic Cancer Father        Review of Systems:  Review of Systems   Constitutional:  Positive for fatigue. Negative for activity change, appetite change and fever. HENT:  Positive for congestion and postnasal drip. Negative for ear discharge, ear pain, rhinorrhea, sinus pressure, sneezing, sore throat, tinnitus and voice change. Respiratory:  Positive for cough, shortness of breath and wheezing. Negative for apnea, choking, chest tightness and stridor. Cardiovascular:  Negative for chest pain, palpitations and leg swelling. Gastrointestinal:  Negative for abdominal distention, abdominal pain, anal bleeding, blood in stool, constipation and diarrhea. Musculoskeletal:  Negative for arthralgias, back pain and gait problem. Skin:  Negative for pallor and rash.    Allergic/Immunologic: Negative for environmental allergies. Neurological:  Negative for dizziness, tremors, seizures, syncope, speech difficulty, weakness, light-headedness, numbness and headaches. Hematological:  Negative for adenopathy. Does not bruise/bleed easily. Psychiatric/Behavioral:  Negative for sleep disturbance. Vitals:    07/15/22 1457   BP: 118/78   Pulse: 95   SpO2: 93%   Weight: 194 lb (88 kg)   Height: 5' 4\" (1.626 m)     No flowsheet data found. Body mass index is 33.3 kg/m². Wt Readings from Last 3 Encounters:   07/15/22 194 lb (88 kg)   06/24/22 195 lb (88.5 kg)   06/07/22 193 lb (87.5 kg)     BP Readings from Last 3 Encounters:   07/15/22 118/78   06/24/22 132/78   06/07/22 110/80         Physical Exam  Constitutional:       General: She is not in acute distress. Appearance: She is well-developed. She is not diaphoretic. HENT:      Mouth/Throat:      Pharynx: No oropharyngeal exudate. Cardiovascular:      Rate and Rhythm: Normal rate and regular rhythm. Heart sounds: Normal heart sounds. No murmur heard. No friction rub. Pulmonary:      Effort: No respiratory distress. Breath sounds: Normal breath sounds. No wheezing, rhonchi or rales. Chest:      Chest wall: No tenderness. Abdominal:      General: There is no distension. Palpations: There is no mass. Tenderness: There is no abdominal tenderness. There is no guarding or rebound. Musculoskeletal:         General: No swelling, tenderness or deformity. Skin:     Coloration: Skin is not pale. Findings: No erythema or rash. Neurological:      Mental Status: She is alert and oriented to person, place, and time. Cranial Nerves: No cranial nerve deficit. Motor: No abnormal muscle tone.       Coordination: Coordination normal.      Deep Tendon Reflexes: Reflexes normal.           Health Maintenance   Topic Date Due    Hepatitis C screen  Never done    COVID-19 Vaccine (4 - Booster for Pfizer series) 03/14/2022    Flu vaccine (1) 09/01/2022    Lipids  02/11/2023    Colorectal Cancer Screen  05/24/2023    Annual Wellness Visit (AWV)  06/08/2023    Depression Screen  06/24/2023    Breast cancer screen  11/02/2023    DTaP/Tdap/Td vaccine (2 - Td or Tdap) 07/29/2024    DEXA (modify frequency per FRAX score)  Completed    Shingles vaccine  Completed    Pneumococcal 65+ years Vaccine  Completed    Hepatitis A vaccine  Aged Out    Hepatitis B vaccine  Aged Out    Hib vaccine  Aged Out    Meningococcal (ACWY) vaccine  Aged Out          Assessment/Plan:     Diagnosis Orders   1. Lung nodule  CT CHEST WO CONTRAST      2. Perennial/seasonal allergies and reactive airway disease    Patient's symptoms are likely to be related to allergies and reactive airway disease. Poor response to ICS/lab inhaler, will discontinue Airduo and try Singulair 10 mg at nighttime. Continue with albuterol inhaler as needed. Patient requested to contact us if she notices any change in symptoms. PFT performed on 6/14 revealed restriction only, FVC of 1.94 L [71% predicted, TLC 70% predicted and DLCO 61% predicted. Personally reviewed CT chest which was performed on 5/27, which shows bilateral scarlike infiltrates which possibly represents old pneumonia/atelectasis. There is also 8 mm nodule in the right lower lobe, marginating the pleura and calcified lymph nodes in the mediastinum. The nodule is likely to be benign and represents granulomatous lung disease. Will repeat CT imaging in August to evaluate for any change in the nodule. Since there is no prior CT imaging, the nodule will ideally need to be followed for a total of 2 years. Return in about 2 months (around 9/15/2022).

## 2022-09-12 RX ORDER — LEVOTHYROXINE SODIUM 0.07 MG/1
75 TABLET ORAL DAILY
Qty: 90 TABLET | Refills: 3 | Status: SHIPPED | OUTPATIENT
Start: 2022-09-12

## 2022-09-12 NOTE — TELEPHONE ENCOUNTER
Medication and Quantity requested:    levothyroxine (SYNTHROID) 75 MCG tablet [    90 day supply     Last Visit  06/24/2022    Pharmacy and phone number updated in EPIC:  yes    [facility]     Patient going on a trip next week and would like to make sure she has her medicine

## 2022-09-12 NOTE — TELEPHONE ENCOUNTER
Medication:   Requested Prescriptions     Pending Prescriptions Disp Refills    levothyroxine (SYNTHROID) 75 MCG tablet 30 tablet      Sig: Take 1 tablet by mouth Daily       Last Filled: No date      Patient Phone Number: [phone] (home)     Last appt: 6/24/2022   Next appt: Visit date not found    Last Thyroid:   Lab Results   Component Value Date/Time    TSH 3.54 02/11/2022 08:34 AM    TSH 6.54 08/17/2016 12:00 AM    T4FREE 1.2 02/11/2022 08:34 AM

## 2022-09-13 ENCOUNTER — HOSPITAL ENCOUNTER (OUTPATIENT)
Dept: CT IMAGING | Age: 82
Discharge: HOME | End: 2022-09-13
Payer: MEDICARE

## 2022-09-13 DIAGNOSIS — R91.1 LUNG NODULE: ICD-10-CM

## 2022-09-13 PROCEDURE — 71250 CT THORAX DX C-: CPT

## 2022-09-20 ENCOUNTER — OFFICE VISIT (OUTPATIENT)
Dept: PULMONOLOGY | Age: 82
End: 2022-09-20
Payer: MEDICARE

## 2022-09-20 VITALS
DIASTOLIC BLOOD PRESSURE: 80 MMHG | WEIGHT: 193 LBS | OXYGEN SATURATION: 94 % | HEART RATE: 90 BPM | SYSTOLIC BLOOD PRESSURE: 131 MMHG | BODY MASS INDEX: 33.13 KG/M2

## 2022-09-20 DIAGNOSIS — R05.3 CHRONIC COUGH: ICD-10-CM

## 2022-09-20 DIAGNOSIS — R91.8 MULTIPLE LUNG NODULES ON CT: ICD-10-CM

## 2022-09-20 DIAGNOSIS — J84.10 GRANULOMATOUS LUNG DISEASE (HCC): Primary | ICD-10-CM

## 2022-09-20 PROCEDURE — 1090F PRES/ABSN URINE INCON ASSESS: CPT | Performed by: INTERNAL MEDICINE

## 2022-09-20 PROCEDURE — G8399 PT W/DXA RESULTS DOCUMENT: HCPCS | Performed by: INTERNAL MEDICINE

## 2022-09-20 PROCEDURE — G8417 CALC BMI ABV UP PARAM F/U: HCPCS | Performed by: INTERNAL MEDICINE

## 2022-09-20 PROCEDURE — G8427 DOCREV CUR MEDS BY ELIG CLIN: HCPCS | Performed by: INTERNAL MEDICINE

## 2022-09-20 PROCEDURE — 99214 OFFICE O/P EST MOD 30 MIN: CPT | Performed by: INTERNAL MEDICINE

## 2022-09-20 PROCEDURE — 1123F ACP DISCUSS/DSCN MKR DOCD: CPT | Performed by: INTERNAL MEDICINE

## 2022-09-20 PROCEDURE — 1036F TOBACCO NON-USER: CPT | Performed by: INTERNAL MEDICINE

## 2022-09-20 RX ORDER — MONTELUKAST SODIUM 10 MG/1
10 TABLET ORAL DAILY
Qty: 90 TABLET | Refills: 1 | Status: SHIPPED | OUTPATIENT
Start: 2022-09-20

## 2022-09-20 ASSESSMENT — ENCOUNTER SYMPTOMS
BACK PAIN: 0
APNEA: 0
STRIDOR: 0
ABDOMINAL PAIN: 0
VOICE CHANGE: 0
CHEST TIGHTNESS: 0
CONSTIPATION: 0
CHOKING: 0
RHINORRHEA: 0
DIARRHEA: 0
BLOOD IN STOOL: 0
WHEEZING: 0
SINUS PRESSURE: 0
COUGH: 1
ABDOMINAL DISTENTION: 0
SHORTNESS OF BREATH: 1
ANAL BLEEDING: 0
SORE THROAT: 0

## 2022-09-20 NOTE — PROGRESS NOTES
Kahlil Dickson    YOB: 1940     Date of Service:  9/20/2022     Chief Complaint   Patient presents with    Shortness of Breath     Had CT done Only using Singulair          HPI patient still continues to have some cough and shortness of breath with exertion. Air duo was switched to Singulair, patient states that it has made no difference with her symptoms. Patient is here to discuss the results of CT chest.    Allergies   Allergen Reactions    Ceftin [Cefuroxime Axetil]      Diarrhea     Outpatient Medications Marked as Taking for the 9/20/22 encounter (Office Visit) with Melissa Mccormack MD   Medication Sig Dispense Refill    montelukast (SINGULAIR) 10 MG tablet Take 1 tablet by mouth daily 90 tablet 1       Immunization History   Administered Date(s) Administered    COVID-19, PFIZER PURPLE top, DILUTE for use, (age 15 y+), 30mcg/0.3mL 01/31/2021, 02/22/2021, 11/14/2021    Influenza Virus Vaccine 10/22/2014, 10/22/2014    Influenza, FLUAD, (age 72 y+), Adjuvanted, 0.5mL 10/15/2020    Influenza, High Dose (Fluzone 65 yrs and older) 12/11/2015, 10/28/2016, 10/28/2016, 10/25/2017, 10/20/2018, 10/06/2019, 11/04/2021, 11/04/2021    Pneumococcal Conjugate 13-valent ([id number]) 02/13/2015    Pneumococcal Polysaccharide ([id number]) 02/18/2019    Tdap (Boostrix, Adacel) 07/29/2014    Zoster Recombinant (Shingrix) 05/06/2019, 05/13/2019, 09/12/2019       Past Medical History:   Diagnosis Date    CAD (coronary artery disease)     Hypercholesterolemia     Hypertension     Pericarditis     history of    Vertigo      Past Surgical History:   Procedure Laterality Date    CATARACT REMOVAL Left 2/2012    FRACTURE SURGERY      repair    TONSILLECTOMY       Family History   Problem Relation Age of Onset    Dementia Mother     Pancreatic Cancer Father        Review of Systems:  Review of Systems   Constitutional:  Positive for fatigue. Negative for activity change, appetite change and fever.    HENT: Negative for congestion, ear discharge, ear pain, postnasal drip, rhinorrhea, sinus pressure, sneezing, sore throat, tinnitus and voice change. Respiratory:  Positive for cough and shortness of breath. Negative for apnea, choking, chest tightness, wheezing and stridor. Cardiovascular:  Negative for chest pain, palpitations and leg swelling. Gastrointestinal:  Negative for abdominal distention, abdominal pain, anal bleeding, blood in stool, constipation and diarrhea. Musculoskeletal:  Negative for arthralgias, back pain and gait problem. Skin:  Negative for pallor and rash. Allergic/Immunologic: Negative for environmental allergies. Neurological:  Negative for dizziness, tremors, seizures, syncope, speech difficulty, weakness, light-headedness, numbness and headaches. Hematological:  Negative for adenopathy. Does not bruise/bleed easily. Psychiatric/Behavioral:  Negative for sleep disturbance. Vitals:    09/20/22 1023   BP: 131/80   Pulse: 90   SpO2: 94%   Weight: 193 lb (87.5 kg)     No flowsheet data found. Body mass index is 33.13 kg/m². Wt Readings from Last 3 Encounters:   09/20/22 193 lb (87.5 kg)   07/15/22 194 lb (88 kg)   06/24/22 195 lb (88.5 kg)     BP Readings from Last 3 Encounters:   09/20/22 131/80   07/15/22 118/78   06/24/22 132/78         Physical Exam  Constitutional:       General: She is not in acute distress. Appearance: She is well-developed. She is not ill-appearing or diaphoretic. HENT:      Mouth/Throat:      Pharynx: No oropharyngeal exudate. Cardiovascular:      Rate and Rhythm: Normal rate and regular rhythm. Heart sounds: Normal heart sounds. No murmur heard. No friction rub. Pulmonary:      Effort: No respiratory distress. Breath sounds: Normal breath sounds. No wheezing, rhonchi or rales. Chest:      Chest wall: No tenderness. Abdominal:      General: There is no distension. Palpations: There is no mass.       Tenderness: There is no abdominal tenderness. There is no guarding or rebound. Musculoskeletal:         General: No swelling or tenderness. Skin:     Coloration: Skin is not pale. Findings: No erythema or rash. Neurological:      Mental Status: She is alert and oriented to person, place, and time. Cranial Nerves: No cranial nerve deficit. Motor: No abnormal muscle tone. Coordination: Coordination normal.      Deep Tendon Reflexes: Reflexes normal.           Health Maintenance   Topic Date Due    Hepatitis C screen  Never done    COVID-19 Vaccine (4 - Booster for Pfizer series) 03/14/2022    Flu vaccine (1) 09/01/2022    Lipids  02/11/2023    Colorectal Cancer Screen  05/24/2023    Annual Wellness Visit (AWV)  06/08/2023    Depression Screen  06/24/2023    Breast cancer screen  11/02/2023    DTaP/Tdap/Td vaccine (2 - Td or Tdap) 07/29/2024    DEXA (modify frequency per FRAX score)  Completed    Shingles vaccine  Completed    Pneumococcal 65+ years Vaccine  Completed    Hepatitis A vaccine  Aged Out    Hepatitis B vaccine  Aged Out    Hib vaccine  Aged Out    Meningococcal (ACWY) vaccine  Aged Out          Assessment/Plan:    I personally reviewed CT chest performed on 9/13 which shows bilateral scarlike opacities particularly at both bases with lung nodules, some of which are heavily calcified associated with calcified lymph nodes. No significant new findings of note. Findings are suggestive of old granulomatous lung disease ? Histoplasmosis. Patient denies any significant exposure history. We will repeat CT imaging of the chest without contrast in 6 months. Patient has not noticed any improvement in symptoms with use of Singulair, which was used to potentially treat perennial allergies and reactive airway disease. Prior PFT study from 6/14 revealed restriction, FVC 1.94 L [71% predicted, TLC 70% predicted and DLCO 61% predicted].   Patient states that she has prior history of recurrent \"pneumonias\". Findings on PFT, most likely correlates with fibrotic lung disease from prior infections. Will check histoplasma antigen in urine along with fungal antibodies. Check for methacholine challenge test in order to rule out asthma/reactive airway disease. Continue with Singulair and albuterol. Return in about 6 months (around 3/20/2023).

## 2022-09-26 DIAGNOSIS — E03.9 ACQUIRED HYPOTHYROIDISM: ICD-10-CM

## 2022-09-26 DIAGNOSIS — N28.89 RENAL MASS, RIGHT: ICD-10-CM

## 2022-09-26 DIAGNOSIS — R73.03 PREDIABETES: ICD-10-CM

## 2022-09-26 DIAGNOSIS — E78.2 MIXED HYPERLIPIDEMIA: ICD-10-CM

## 2022-09-26 DIAGNOSIS — Z01.818 PREOP EXAMINATION: ICD-10-CM

## 2022-09-26 DIAGNOSIS — J84.10 GRANULOMATOUS LUNG DISEASE (HCC): ICD-10-CM

## 2022-09-26 LAB
A/G RATIO: 1.6 (ref 1.1–2.2)
ALBUMIN SERPL-MCNC: 4.6 G/DL (ref 3.4–5)
ALP BLD-CCNC: 109 U/L (ref 40–129)
ALT SERPL-CCNC: 11 U/L (ref 10–40)
ANION GAP SERPL CALCULATED.3IONS-SCNC: 15 MMOL/L (ref 3–16)
AST SERPL-CCNC: 12 U/L (ref 15–37)
BASOPHILS ABSOLUTE: 0 K/UL (ref 0–0.2)
BASOPHILS RELATIVE PERCENT: 0.3 %
BILIRUB SERPL-MCNC: 0.5 MG/DL (ref 0–1)
BUN BLDV-MCNC: 18 MG/DL (ref 7–20)
CALCIUM SERPL-MCNC: 9.8 MG/DL (ref 8.3–10.6)
CHLORIDE BLD-SCNC: 99 MMOL/L (ref 99–110)
CHOLESTEROL, FASTING: 158 MG/DL (ref 0–199)
CO2: 27 MMOL/L (ref 21–32)
CREAT SERPL-MCNC: 0.9 MG/DL (ref 0.6–1.2)
EOSINOPHILS ABSOLUTE: 0.1 K/UL (ref 0–0.6)
EOSINOPHILS RELATIVE PERCENT: 1.1 %
GFR AFRICAN AMERICAN: >60
GFR NON-AFRICAN AMERICAN: >60
GLUCOSE FASTING: 108 MG/DL (ref 70–99)
HCT VFR BLD CALC: 38.5 % (ref 36–48)
HDLC SERPL-MCNC: 66 MG/DL (ref 40–60)
HEMOGLOBIN: 12.8 G/DL (ref 12–16)
LDL CHOLESTEROL CALCULATED: 76 MG/DL
LYMPHOCYTES ABSOLUTE: 1.5 K/UL (ref 1–5.1)
LYMPHOCYTES RELATIVE PERCENT: 13.7 %
MCH RBC QN AUTO: 30.5 PG (ref 26–34)
MCHC RBC AUTO-ENTMCNC: 33.1 G/DL (ref 31–36)
MCV RBC AUTO: 92 FL (ref 80–100)
MONOCYTES ABSOLUTE: 1.3 K/UL (ref 0–1.3)
MONOCYTES RELATIVE PERCENT: 11.7 %
NEUTROPHILS ABSOLUTE: 7.9 K/UL (ref 1.7–7.7)
NEUTROPHILS RELATIVE PERCENT: 73.2 %
PDW BLD-RTO: 14.7 % (ref 12.4–15.4)
PLATELET # BLD: 339 K/UL (ref 135–450)
PMV BLD AUTO: 7 FL (ref 5–10.5)
POTASSIUM SERPL-SCNC: 4.3 MMOL/L (ref 3.5–5.1)
RBC # BLD: 4.19 M/UL (ref 4–5.2)
SODIUM BLD-SCNC: 141 MMOL/L (ref 136–145)
T4 FREE: 1.5 NG/DL (ref 0.9–1.8)
TOTAL PROTEIN: 7.4 G/DL (ref 6.4–8.2)
TRIGLYCERIDE, FASTING: 82 MG/DL (ref 0–150)
TSH SERPL DL<=0.05 MIU/L-ACNC: 2.67 UIU/ML (ref 0.27–4.2)
VLDLC SERPL CALC-MCNC: 16 MG/DL
WBC # BLD: 10.7 K/UL (ref 4–11)

## 2022-09-27 LAB
ESTIMATED AVERAGE GLUCOSE: 137 MG/DL
HBA1C MFR BLD: 6.4 %

## 2022-09-27 NOTE — PROGRESS NOTES
Veronica Silva is a 80 y.o. female. HPI:  Here with concerned about ankle swelling and upcoming travel  recommend compression stockings    To complain of nagging cough some sinus congestion and pressure associated with postnasal drip  COVID testing multiple times is negative    Recent labs reviewed with patient in detail  Medical problems including hypertension, hypothyroidism, hyperlipidemia, prediabetes all addressed with patient today    Meds, vitamins and allergies reviewed with pt    Wt Readings from Last 3 Encounters:   09/28/22 190 lb 3.2 oz (86.3 kg)   09/20/22 193 lb (87.5 kg)   07/15/22 194 lb (88 kg)       REVIEW OF SYSTEMS:   CONSTITUTIONAL: See history of present illness,   Weight noted   HEENT: No new vision difficulties or ringing in the ears. RESPIRATORY: No new SOB, PND, orthopnea or cough. CARDIOVASCULAR: no CP, palpitations or SOB with exertion  GI: No nausea, vomiting, diarrhea, constipation, abdominal pain or changes in bowel habits. : No urinary frequency, urgency, incontinence hematuria or dysuria. SKIN: No cyanosis or skin lesions. MUSCULOSKELETAL: No new muscle or joint pain. NEUROLOGICAL: No syncope or TIA-like symptoms. PSYCHIATRIC: No anxiety, insomnia or depression     Allergies   Allergen Reactions    Ceftin [Cefuroxime Axetil]      Diarrhea       Prior to Visit Medications    Medication Sig Taking?  Authorizing Provider   olmesartan-hydroCHLOROthiazide (BENICAR HCT) 40-12.5 MG per tablet Take 1 tablet by mouth daily Yes Yareli Gallardo MD   fluticasone (FLONASE) 50 MCG/ACT nasal spray 2 sprays by Each Nostril route daily Yes Yareli Gallardo MD   loratadine (CLARITIN) 10 MG tablet Take 1 tablet by mouth daily Yes Yareli Gallardo MD   doxycycline hyclate (VIBRAMYCIN) 100 MG capsule Take 1 capsule by mouth 2 times daily for 10 days Yes Yareli Gallardo MD   predniSONE (DELTASONE) 20 MG tablet Take 1 tablet by mouth daily for 7 days Yes Yarlei Gallardo MD   montelukast (SINGULAIR) 10 MG tablet Take 1 tablet by mouth daily Yes Esequiel Miranda MD   levothyroxine (SYNTHROID) 75 MCG tablet Take 1 tablet by mouth Daily Yes Myron Gross MD   metFORMIN (GLUCOPHAGE-XR) 500 MG extended release tablet TAKE 2 TABLETS DAILY WITH  SUPPER Yes Myron Gross MD   atorvastatin (LIPITOR) 10 MG tablet TAKE 1 TABLET DAILY Yes Myron Gross MD   latanoprost (XALATAN) 0.005 % ophthalmic solution  Yes Historical Provider, MD   Coenzyme Q-10 100 MG CAPS Take 1 capsule by mouth Daily Yes Historical Provider, MD   calcium citrate-vitamin D (CITRICAL + D) 315-250 MG-UNIT TABS Take 2 tablets by mouth. Yes Historical Provider, MD   aspirin 81 MG tablet Take 81 mg by mouth daily. Yes Historical Provider, MD   montelukast (SINGULAIR) 10 MG tablet Take 1 tablet by mouth in the morning. Esequiel Miranda MD       Past Medical History:   Diagnosis Date    CAD (coronary artery disease)     Hypercholesterolemia     Hypertension     Pericarditis     history of    Vertigo        Social History     Tobacco Use    Smoking status: Former     Packs/day: 1.00     Years: 4.00     Pack years: 4.00     Types: Cigarettes     Quit date: 1965     Years since quittin.2    Smokeless tobacco: Never    Tobacco comments:     stopped using cig many years ago late teens early 19's   Substance Use Topics    Alcohol use: Yes     Comment: seldom       Family History   Problem Relation Age of Onset    Dementia Mother     Pancreatic Cancer Father        OBJECTIVE:  /78 (Site: Left Upper Arm, Position: Sitting, Cuff Size: Large Adult)   Pulse 95   Resp 16   Ht 5' 4\" (1.626 m)   Wt 190 lb 3.2 oz (86.3 kg)   SpO2 94%   BMI 32.65 kg/m²   GEN: URI symptoms with postnasal drip  HEENT:  NCAT, TMs:normal and throat: Thickish postnasal drip seen in the back of her throat, tonsils unremarkable  NECK:  Supple without adenopathy.   CV:  Regular rate and rhythm, S1 and S2 normal, no murmurs, clicks  PULM: Chest is clear, no wheezing ,  symmetric air entry throughout both lung fields. ABD: Soft, NT  EXT: No rash or edema  NEURO: Alert oriented ×3, nonfocal     Lab Results   Component Value Date    CREATININE 0.9 09/26/2022        Lab Results   Component Value Date    LABA1C 6.4 09/26/2022     Lab Results   Component Value Date    .0 09/26/2022        ASSESSMENT/PLAN:  1. Edema of both ankles  Likely amlodipine  Changed to Benicar HCT  Monitor blood pressure and edema in ankles  Wear support stockings on trip as well as mask    2. Medication side effects  Likely amlodipine, change blood pressure medication    3. Protracted URI  Flonase and Claritin  Doxycycline with food twice daily  Take antibiotic with food/water and probiotic     4. Postnasal drip  Claritin and Flonase  Doxycycline twice daily with food twice daily  Take antibiotic with food/water and probiotic     5. Essential hypertension  Changed to Benicar HCT 40/12.5 and monitor blood pressure    6. Acquired hypothyroidism  Stable on thyroid supplementation  Continue levothyroxine 75 mcg daily    7. Mixed hyperlipidemia  Stable continue statin/Lipitor 10 mg nightly    8. Prediabetes  Stable continue healthy diet and exercise and metformin twice daily with food  Follow-up every 6 months    9.  Encounter to discuss test results  Labs reviewed with patient in detail    40 Total Minutes spent pre charting (reviewing problem list, meds, any test results, consultant and hospital notes ) and  obtaining present visit history, performing appropriate medical exam/evaluation, counseling and educating the patient (and family), ordering medications ,tests, and procedures as needed, refilling medication(s), placing referral(s) when needed in addition to coordinating care for this patient and documenting in electronic health record

## 2022-09-28 ENCOUNTER — OFFICE VISIT (OUTPATIENT)
Dept: FAMILY MEDICINE CLINIC | Age: 82
End: 2022-09-28
Payer: MEDICARE

## 2022-09-28 VITALS
BODY MASS INDEX: 32.47 KG/M2 | RESPIRATION RATE: 16 BRPM | DIASTOLIC BLOOD PRESSURE: 78 MMHG | HEART RATE: 95 BPM | OXYGEN SATURATION: 94 % | HEIGHT: 64 IN | WEIGHT: 190.2 LBS | SYSTOLIC BLOOD PRESSURE: 111 MMHG

## 2022-09-28 DIAGNOSIS — R73.03 PREDIABETES: ICD-10-CM

## 2022-09-28 DIAGNOSIS — M25.471 EDEMA OF BOTH ANKLES: Primary | ICD-10-CM

## 2022-09-28 DIAGNOSIS — Z71.2 ENCOUNTER TO DISCUSS TEST RESULTS: ICD-10-CM

## 2022-09-28 DIAGNOSIS — E03.9 ACQUIRED HYPOTHYROIDISM: ICD-10-CM

## 2022-09-28 DIAGNOSIS — I10 ESSENTIAL HYPERTENSION: ICD-10-CM

## 2022-09-28 DIAGNOSIS — E78.2 MIXED HYPERLIPIDEMIA: ICD-10-CM

## 2022-09-28 DIAGNOSIS — M25.472 EDEMA OF BOTH ANKLES: Primary | ICD-10-CM

## 2022-09-28 DIAGNOSIS — R09.82 POSTNASAL DRIP: ICD-10-CM

## 2022-09-28 DIAGNOSIS — T88.7XXA MEDICATION SIDE EFFECTS: ICD-10-CM

## 2022-09-28 DIAGNOSIS — J06.9 PROTRACTED URI: ICD-10-CM

## 2022-09-28 LAB
HISTOPLASMA ANTIGEN URINE INTERP: NOT DETECTED
HISTOPLASMA ANTIGEN URINE: NOT DETECTED NG/ML

## 2022-09-28 PROCEDURE — 1036F TOBACCO NON-USER: CPT | Performed by: FAMILY MEDICINE

## 2022-09-28 PROCEDURE — G8417 CALC BMI ABV UP PARAM F/U: HCPCS | Performed by: FAMILY MEDICINE

## 2022-09-28 PROCEDURE — 1123F ACP DISCUSS/DSCN MKR DOCD: CPT | Performed by: FAMILY MEDICINE

## 2022-09-28 PROCEDURE — G8399 PT W/DXA RESULTS DOCUMENT: HCPCS | Performed by: FAMILY MEDICINE

## 2022-09-28 PROCEDURE — 1090F PRES/ABSN URINE INCON ASSESS: CPT | Performed by: FAMILY MEDICINE

## 2022-09-28 PROCEDURE — 99215 OFFICE O/P EST HI 40 MIN: CPT | Performed by: FAMILY MEDICINE

## 2022-09-28 PROCEDURE — G8427 DOCREV CUR MEDS BY ELIG CLIN: HCPCS | Performed by: FAMILY MEDICINE

## 2022-09-28 RX ORDER — DOXYCYCLINE HYCLATE 100 MG/1
100 CAPSULE ORAL 2 TIMES DAILY
Qty: 20 CAPSULE | Refills: 0 | Status: SHIPPED | OUTPATIENT
Start: 2022-09-28 | End: 2022-10-08

## 2022-09-28 RX ORDER — LORATADINE 10 MG/1
10 TABLET ORAL DAILY
Qty: 30 TABLET | Refills: 1 | Status: SHIPPED | OUTPATIENT
Start: 2022-09-28

## 2022-09-28 RX ORDER — PREDNISONE 20 MG/1
20 TABLET ORAL DAILY
Qty: 7 TABLET | Refills: 0 | Status: SHIPPED | OUTPATIENT
Start: 2022-09-28 | End: 2022-10-05

## 2022-09-28 RX ORDER — FLUTICASONE PROPIONATE 50 MCG
2 SPRAY, SUSPENSION (ML) NASAL DAILY
Qty: 16 G | Refills: 5 | Status: SHIPPED | OUTPATIENT
Start: 2022-09-28

## 2022-09-28 RX ORDER — OLMESARTAN MEDOXOMIL AND HYDROCHLOROTHIAZIDE 40/12.5 40; 12.5 MG/1; MG/1
1 TABLET ORAL DAILY
Qty: 30 TABLET | Refills: 1 | Status: SHIPPED | OUTPATIENT
Start: 2022-09-28

## 2022-09-28 ASSESSMENT — PATIENT HEALTH QUESTIONNAIRE - PHQ9
SUM OF ALL RESPONSES TO PHQ9 QUESTIONS 1 & 2: 0
SUM OF ALL RESPONSES TO PHQ QUESTIONS 1-9: 0
2. FEELING DOWN, DEPRESSED OR HOPELESS: 0
SUM OF ALL RESPONSES TO PHQ QUESTIONS 1-9: 0
1. LITTLE INTEREST OR PLEASURE IN DOING THINGS: 0
SUM OF ALL RESPONSES TO PHQ QUESTIONS 1-9: 0
SUM OF ALL RESPONSES TO PHQ QUESTIONS 1-9: 0

## 2022-10-01 LAB
ASPERGILLUS ANTIBODY ID: NOT DETECTED
BLASTOMYCES ANTIBODY ID: NOT DETECTED
COCCIDIOIDES ANTIBODY ID: NOT DETECTED
HISTOPLASMA ABS, ID: NOT DETECTED

## 2022-10-11 ENCOUNTER — HOSPITAL ENCOUNTER (OUTPATIENT)
Dept: PULMONOLOGY | Age: 82
Discharge: HOME | End: 2022-10-11
Payer: MEDICARE

## 2022-10-11 VITALS — RESPIRATION RATE: 16 BRPM | HEART RATE: 91 BPM | OXYGEN SATURATION: 91 %

## 2022-10-11 DIAGNOSIS — R05.3 CHRONIC COUGH: ICD-10-CM

## 2022-10-11 PROCEDURE — 6360000002 HC RX W HCPCS: Performed by: INTERNAL MEDICINE

## 2022-10-11 PROCEDURE — 94070 EVALUATION OF WHEEZING: CPT

## 2022-10-11 PROCEDURE — 94760 N-INVAS EAR/PLS OXIMETRY 1: CPT

## 2022-10-11 PROCEDURE — 6370000000 HC RX 637 (ALT 250 FOR IP): Performed by: INTERNAL MEDICINE

## 2022-10-11 PROCEDURE — 94010 BREATHING CAPACITY TEST: CPT

## 2022-10-11 RX ORDER — SODIUM CHLORIDE FOR INHALATION 0.9 %
3 VIAL, NEBULIZER (ML) INHALATION ONCE
Status: COMPLETED | OUTPATIENT
Start: 2022-10-11 | End: 2022-10-11

## 2022-10-11 RX ORDER — ALBUTEROL SULFATE 2.5 MG/3ML
2.5 SOLUTION RESPIRATORY (INHALATION) ONCE
Status: COMPLETED | OUTPATIENT
Start: 2022-10-11 | End: 2022-10-11

## 2022-10-11 RX ADMIN — ALBUTEROL SULFATE 2.5 MG: 2.5 SOLUTION RESPIRATORY (INHALATION) at 10:53

## 2022-10-11 RX ADMIN — METHACHOLINE CHLORIDE 0.25 MG: 100 POWDER, FOR SOLUTION RESPIRATORY (INHALATION) at 10:41

## 2022-10-11 RX ADMIN — METHACHOLINE CHLORIDE 1 MG: 100 POWDER, FOR SOLUTION RESPIRATORY (INHALATION) at 10:44

## 2022-10-11 RX ADMIN — METHACHOLINE CHLORIDE 0.06 MG: 100 POWDER, FOR SOLUTION RESPIRATORY (INHALATION) at 10:38

## 2022-10-11 RX ADMIN — METHACHOLINE CHLORIDE 16 MG: 100 POWDER, FOR SOLUTION RESPIRATORY (INHALATION) at 10:50

## 2022-10-11 RX ADMIN — METHACHOLINE CHLORIDE 4 MG: 100 POWDER, FOR SOLUTION RESPIRATORY (INHALATION) at 10:47

## 2022-10-11 RX ADMIN — Medication 3 ML: at 10:04

## 2022-10-11 NOTE — RT PROTOCOL NOTE
Bronchial Challenge completed. Pt had a 20% decrease in FEV 1 after 5th dose of methacholine.(16mg). Pt given albuterol HHN for reversal of methacholine. Pt retested back to baseline.

## 2022-10-12 NOTE — PROCEDURES
[facility]                     [facility], [address]                               PULMONARY FUNCTION    PATIENT NAME: Karyle Crook                   :        1940  MED REC NO:   [id number]                          ROOM:  ACCOUNT NO:   [de-identified]                           ADMIT DATE: 10/11/2022  PROVIDER:     Abby Scales MD    DATE OF PROCEDURE:  10/11/2022    Spirometry reveals decreased FVC at 1.92 liters, which is 73% predicted. FEV1 is normal.  FEV1/FVC ratio is normal.  Expiratory flow rates are  normal.  There is no significant change after inhaled bronchodilators. IMPRESSION:  Overall normal spirometry.         Mona Perez MD    D: 10/12/2022 11:45:45       T: 10/12/2022 13:52:53     [id number]  Job#: [id number]     Doc#: [id number]    CC:

## 2022-10-12 NOTE — PROCEDURES
[facility]                     [facility], [address]                               PULMONARY FUNCTION    PATIENT NAME: Ginger Jin                   :        1940  MED REC NO:   [id number]                          ROOM:  ACCOUNT NO:   [de-identified]                           ADMIT DATE: 10/11/2022  PROVIDER:     Rianna Mc MD    DATE OF PROCEDURE:  10/11/2022    This is a methacholine bronchoprovocation. The patient revealed decrease in FEV1 of 20% of the highest dose of  inhaled methacholine. IMPRESSION:  Borderline airway hyperreactivity in response to  methacholine bronchoprovocation.         Nata Solo MD    D: 10/12/2022 11:47:11       T: 10/12/2022 13:56:02     [id number]  Job#: [id number]     Doc#: [id number]    CC:

## 2022-10-17 ENCOUNTER — TELEPHONE (OUTPATIENT)
Dept: PULMONOLOGY | Age: 82
End: 2022-10-17

## 2022-10-17 RX ORDER — BUDESONIDE AND FORMOTEROL FUMARATE DIHYDRATE 80; 4.5 UG/1; UG/1
2 AEROSOL RESPIRATORY (INHALATION) 2 TIMES DAILY
Qty: 10.2 G | Refills: 3 | Status: SHIPPED | OUTPATIENT
Start: 2022-10-17 | End: 2022-10-25 | Stop reason: ALTCHOICE

## 2022-10-17 NOTE — TELEPHONE ENCOUNTER
Pt called and wanted to get results of pft test and also wanted to know if she can get covid and flu shots.     Ph # [phone]

## 2022-10-18 ENCOUNTER — TELEPHONE (OUTPATIENT)
Dept: PULMONOLOGY | Age: 82
End: 2022-10-18

## 2022-10-18 NOTE — TELEPHONE ENCOUNTER
Patient called and said the Symbicort is to expensive and wants to know if there is something cheaper but still does the same thing.       [place] [address]: [phone]

## 2022-10-20 RX ORDER — FLUTICASONE PROPIONATE AND SALMETEROL 250; 50 UG/1; UG/1
1 POWDER RESPIRATORY (INHALATION) EVERY 12 HOURS
Qty: 60 EACH | Refills: 3 | Status: SHIPPED | OUTPATIENT
Start: 2022-10-20

## 2022-10-25 ENCOUNTER — OFFICE VISIT (OUTPATIENT)
Dept: FAMILY MEDICINE CLINIC | Age: 82
End: 2022-10-25
Payer: MEDICARE

## 2022-10-25 VITALS
SYSTOLIC BLOOD PRESSURE: 136 MMHG | HEIGHT: 64 IN | OXYGEN SATURATION: 91 % | WEIGHT: 188 LBS | DIASTOLIC BLOOD PRESSURE: 86 MMHG | RESPIRATION RATE: 16 BRPM | BODY MASS INDEX: 32.1 KG/M2 | HEART RATE: 94 BPM

## 2022-10-25 DIAGNOSIS — Z23 FLU VACCINE NEED: ICD-10-CM

## 2022-10-25 DIAGNOSIS — I10 ESSENTIAL HYPERTENSION: Primary | ICD-10-CM

## 2022-10-25 DIAGNOSIS — J45.31 MILD PERSISTENT ASTHMA WITH ACUTE EXACERBATION: ICD-10-CM

## 2022-10-25 PROCEDURE — 1036F TOBACCO NON-USER: CPT | Performed by: FAMILY MEDICINE

## 2022-10-25 PROCEDURE — G8399 PT W/DXA RESULTS DOCUMENT: HCPCS | Performed by: FAMILY MEDICINE

## 2022-10-25 PROCEDURE — G8427 DOCREV CUR MEDS BY ELIG CLIN: HCPCS | Performed by: FAMILY MEDICINE

## 2022-10-25 PROCEDURE — G8417 CALC BMI ABV UP PARAM F/U: HCPCS | Performed by: FAMILY MEDICINE

## 2022-10-25 PROCEDURE — 1090F PRES/ABSN URINE INCON ASSESS: CPT | Performed by: FAMILY MEDICINE

## 2022-10-25 PROCEDURE — 90694 VACC AIIV4 NO PRSRV 0.5ML IM: CPT | Performed by: FAMILY MEDICINE

## 2022-10-25 PROCEDURE — 1123F ACP DISCUSS/DSCN MKR DOCD: CPT | Performed by: FAMILY MEDICINE

## 2022-10-25 PROCEDURE — 99214 OFFICE O/P EST MOD 30 MIN: CPT | Performed by: FAMILY MEDICINE

## 2022-10-25 PROCEDURE — G0008 ADMIN INFLUENZA VIRUS VAC: HCPCS | Performed by: FAMILY MEDICINE

## 2022-10-25 PROCEDURE — G8484 FLU IMMUNIZE NO ADMIN: HCPCS | Performed by: FAMILY MEDICINE

## 2022-10-25 ASSESSMENT — PATIENT HEALTH QUESTIONNAIRE - PHQ9
SUM OF ALL RESPONSES TO PHQ9 QUESTIONS 1 & 2: 0
SUM OF ALL RESPONSES TO PHQ QUESTIONS 1-9: 0
1. LITTLE INTEREST OR PLEASURE IN DOING THINGS: 0
SUM OF ALL RESPONSES TO PHQ QUESTIONS 1-9: 0
2. FEELING DOWN, DEPRESSED OR HOPELESS: 0

## 2022-10-25 NOTE — PROGRESS NOTES
Karina Pinzon is a [de-identified] y.o. female. HPI:  Here For blood pressure check and ankle edema check  Amlodipine stopped /causing edema    Leg edema resolved, blood pressure = normal at home  Still coughing but is only been on Advair and singular for a few days  Recommend giving it more time and follow-up with Dr. Ruth Reed in 2 to 4 weeks    Saw pulmonary, PFT showed mild asthma  Using Advair twice a day, just started  Just started on Singulair also    If symptoms persist, allergy referral    Offer flu vaccine today     Meds, vitamins and allergies reviewed with pt    Bp at home 120 70 range     Wt Readings from Last 3 Encounters:   10/25/22 188 lb (85.3 kg)   09/28/22 190 lb 3.2 oz (86.3 kg)   09/20/22 193 lb (87.5 kg)       REVIEW OF SYSTEMS:   CONSTITUTIONAL: See history of present illness,   Weight noted   HEENT: No new vision difficulties or ringing in the ears. RESPIRATORY: No new SOB, PND, orthopnea or cough. CARDIOVASCULAR: no CP, palpitations or SOB with exertion  GI: No nausea, vomiting, diarrhea, constipation, abdominal pain or changes in bowel habits. : No urinary frequency, urgency, incontinence hematuria or dysuria. SKIN: No cyanosis or skin lesions. MUSCULOSKELETAL: No new muscle or joint pain. NEUROLOGICAL: No syncope or TIA-like symptoms. PSYCHIATRIC: No anxiety, insomnia or depression     Allergies   Allergen Reactions    Ceftin [Cefuroxime Axetil]      Diarrhea    Norvasc [Amlodipine] Swelling     Lower extremity edema       Prior to Visit Medications    Medication Sig Taking? Authorizing Provider   fluticasone-salmeterol (ADVAIR DISKUS) 250-50 MCG/ACT AEPB diskus inhaler Inhale 1 puff into the lungs in the morning and 1 puff in the evening.  Yes Gautam Houston MD   olmesartan-hydroCHLOROthiazide (BENICAR HCT) 40-12.5 MG per tablet Take 1 tablet by mouth daily Yes Almita Wiley MD   fluticasone (FLONASE) 50 MCG/ACT nasal spray 2 sprays by Each Nostril route daily Yes Sarita HUDSON MD Ángel   loratadine (CLARITIN) 10 MG tablet Take 1 tablet by mouth daily Yes Prakash Rodriguez MD   montelukast (SINGULAIR) 10 MG tablet Take 1 tablet by mouth daily Yes Shweta Perez MD   levothyroxine (SYNTHROID) 75 MCG tablet Take 1 tablet by mouth Daily Yes Prakash Rodriguez MD   montelukast (SINGULAIR) 10 MG tablet Take 1 tablet by mouth in the morning. Yes Shweta Perez MD   metFORMIN (GLUCOPHAGE-XR) 500 MG extended release tablet TAKE 2 TABLETS DAILY WITH  SUPPER Yes Prakash Rodriguez MD   atorvastatin (LIPITOR) 10 MG tablet TAKE 1 TABLET DAILY Yes Prakash Rodriguez MD   latanoprost (XALATAN) 0.005 % ophthalmic solution  Yes Historical Provider, MD   Coenzyme Q-10 100 MG CAPS Take 1 capsule by mouth Daily Yes Historical Provider, MD   calcium citrate-vitamin D (CITRICAL + D) 315-250 MG-UNIT TABS Take 2 tablets by mouth. Yes Historical Provider, MD   aspirin 81 MG tablet Take 81 mg by mouth daily. Yes Historical Provider, MD       Past Medical History:   Diagnosis Date    CAD (coronary artery disease)     Hypercholesterolemia     Hypertension     Pericarditis     history of    Vertigo        Social History     Tobacco Use    Smoking status: Former     Packs/day: 1.00     Years: 4.00     Pack years: 4.00     Types: Cigarettes     Quit date: 1965     Years since quittin.3    Smokeless tobacco: Never    Tobacco comments:     stopped using cig many years ago late teens early 19's   Substance Use Topics    Alcohol use: Yes     Comment: seldom       Family History   Problem Relation Age of Onset    Dementia Mother     Pancreatic Cancer Father        OBJECTIVE:  /86   Pulse 94   Resp 16   Ht 5' 4\" (1.626 m)   Wt 188 lb (85.3 kg)   SpO2 91%   BMI 32.27 kg/m²   GEN:  in NAD, residual wheezy cough  HEENT:  NCAT, TMs:normal  NECK:  Supple without adenopathy.   CV:  Regular rate and rhythm, S1 and S2 normal, no murmurs, clicks  PULM:  Chest is clear, no wheezing ,  symmetric air entry throughout both lung fields. ABD: Soft, NT, no masses appreciated  EXT: No rash or edema  NEURO: Alert oriented ×3, nonfocal, no assistive device    ASSESSMENT/PLAN:  1. Essential hypertension  Stable, continue Benicar HCT 40/12.5  Edema resolved off Norvasc    Check blood pressure 2 to 4 weeks    2. Flu vaccine need   vaccinate today  - Influenza, FLUAD, (age 72 y+), IM, Preservative Free, 0.5 mL    3.  Mild persistent asthma with acute exacerbation  Advair twice daily  Continue Singulair nightly  Allergy referral  If symptoms persist, trial of Trelegy  - External Referral To Allergy    reCheck asthma 2 to 4 weeks    PFTs reviewed    30 Total Minutes spent pre charting (reviewing problem list, meds, any test results, consultant and hospital notes ) and  obtaining present visit history, performing appropriate medical exam/evaluation, counseling and educating the patient (and family), ordering medications ,tests, and procedures as needed, refilling medication(s), placing referral(s) when needed in addition to coordinating care for this patient and documenting in electronic health record

## 2022-10-25 NOTE — PATIENT INSTRUCTIONS
[facility] [address] [facility], [address]  Tel. [phone]    [address]  Dr. Elisa Alvarado  [address], [address] [place] [address]  Phone: [phone]    [facility], [address] [facility], [address]    MD Deborah Alvarado MD  [phone]    Fax: [phone]

## 2022-11-01 ENCOUNTER — PATIENT MESSAGE (OUTPATIENT)
Dept: FAMILY MEDICINE CLINIC | Age: 82
End: 2022-11-01

## 2022-11-01 NOTE — TELEPHONE ENCOUNTER
From: Angella Austin  To: Dr. Orlin Xie: 11/1/2022 4:05 PM EDT  Subject: results of allergy tests    Saw Dr Maria L Hernandez on Monday and the tests he gave me showed no allergies. Anything else I need to know about asthma and/or my cough?

## 2022-11-08 ENCOUNTER — HOSPITAL ENCOUNTER (OUTPATIENT)
Dept: WOMENS IMAGING | Age: 82
Discharge: HOME | End: 2022-11-08
Payer: MEDICARE

## 2022-11-08 ENCOUNTER — PATIENT MESSAGE (OUTPATIENT)
Dept: FAMILY MEDICINE CLINIC | Age: 82
End: 2022-11-08

## 2022-11-08 VITALS — WEIGHT: 189 LBS | HEIGHT: 64 IN | BODY MASS INDEX: 32.27 KG/M2

## 2022-11-08 DIAGNOSIS — Z12.31 VISIT FOR SCREENING MAMMOGRAM: ICD-10-CM

## 2022-11-08 PROCEDURE — 77063 BREAST TOMOSYNTHESIS BI: CPT

## 2022-11-08 RX ORDER — OLMESARTAN MEDOXOMIL AND HYDROCHLOROTHIAZIDE 40/12.5 40; 12.5 MG/1; MG/1
1 TABLET ORAL DAILY
Qty: 90 TABLET | Refills: 3 | Status: SHIPPED | OUTPATIENT
Start: 2022-11-08

## 2022-11-08 RX ORDER — FLUTICASONE PROPIONATE AND SALMETEROL 250; 50 UG/1; UG/1
1 POWDER RESPIRATORY (INHALATION) EVERY 12 HOURS
Qty: 60 EACH | Refills: 3 | OUTPATIENT
Start: 2022-11-08

## 2022-11-08 RX ORDER — OLMESARTAN MEDOXOMIL AND HYDROCHLOROTHIAZIDE 40/12.5 40; 12.5 MG/1; MG/1
1 TABLET ORAL DAILY
Qty: 90 TABLET | Refills: 3 | Status: CANCELLED | OUTPATIENT
Start: 2022-11-08

## 2022-11-08 RX ORDER — FLUTICASONE PROPIONATE AND SALMETEROL 250; 50 UG/1; UG/1
1 POWDER RESPIRATORY (INHALATION) EVERY 12 HOURS
Qty: 60 EACH | Refills: 3 | Status: CANCELLED | OUTPATIENT
Start: 2022-11-08

## 2022-11-08 NOTE — TELEPHONE ENCOUNTER
From: Camila Elizabeth  To: Dr. Manuel Marquez: 11/8/2022 9:27 AM EST  Subject: inhaler + olmesartan    Will run out of sample Trelegy on Friday, but do have about 8 more days of Advair. Could you please refill one of those at [facility] (phone [phone] or fax [phone]) for 90 day refills to be filled asap. Also need to fill olmesartan for 90-day refills at same place. Prefer NOT to keep getting these at local [employer] store if they can be filled by mail order.      Thanks

## 2022-11-25 RX ORDER — OLMESARTAN MEDOXOMIL AND HYDROCHLOROTHIAZIDE 40/12.5 40; 12.5 MG/1; MG/1
TABLET ORAL
Qty: 30 TABLET | Refills: 1 | OUTPATIENT
Start: 2022-11-25

## 2022-11-25 NOTE — TELEPHONE ENCOUNTER
Medication:   Requested Prescriptions     Pending Prescriptions Disp Refills    olmesartan-hydroCHLOROthiazide (BENICAR HCT) 40-12.5 MG per tablet [Pharmacy Med Name: OLMESARTAN-HCTZ 40-12.5 MG TAB] 30 tablet 1     Sig: TAKE 1 TABLET BY MOUTH EVERY DAY       Last Filled:  11/8/2022    Patient Phone Number: [phone] (home)     Last appt: 10/25/2022   Next appt: Visit date not found    Lab Results   Component Value Date     09/26/2022    K 4.3 09/26/2022    CL 99 09/26/2022    CO2 27 09/26/2022    BUN 18 09/26/2022    CREATININE 0.9 09/26/2022    GLUCOSE 98 02/11/2022    CALCIUM 9.8 09/26/2022    PROT 7.4 09/26/2022    LABALBU 4.6 09/26/2022    BILITOT 0.5 09/26/2022    ALKPHOS 109 09/26/2022    AST 12 (L) 09/26/2022    ALT 11 09/26/2022    LABGLOM >60 09/26/2022    GFRAA >60 09/26/2022    AGRATIO 1.6 09/26/2022    GLOB 2.9 02/11/2022

## 2023-01-17 NOTE — PROGRESS NOTES
Sitting, Cuff Size: Small Adult)   Pulse 99   Temp 98 °F (36.7 °C)   Ht 5' 4.5\" (1.638 m)   Wt 176 lb 9.6 oz (80.1 kg)   SpO2 96%   BMI 29.85 kg/m²   GEN:  URI and postnasal drip  HEENT:  NCAT, TMs:normal and throat: clear  NECK:  Supple without adenopathy. CV:  Regular rate and rhythm, S1 and S2 normal, no murmurs, clicks  PULM:  Chest with expiratory wheezing and cough  ABD: Soft, NT  EXT: No rash or edema    ASSESSMENT/PLAN:  1. SOB (shortness of breath) on exertion  PFTs when she is improved  - FULL PFT STUDY WITH PRE AND POST; Future    2.  Bronchitis with bronchospasm  Doxycycline + breo sample inhaler  Follow up if symptoms persist or worsen Patient/Caregiver provided printed discharge information.

## 2023-03-07 ENCOUNTER — HOSPITAL ENCOUNTER (OUTPATIENT)
Dept: CT IMAGING | Age: 83
Discharge: HOME | End: 2023-03-07
Payer: MEDICARE

## 2023-03-07 DIAGNOSIS — R91.8 MULTIPLE LUNG NODULES ON CT: ICD-10-CM

## 2023-03-07 DIAGNOSIS — J84.10 GRANULOMATOUS LUNG DISEASE (HCC): ICD-10-CM

## 2023-03-07 PROCEDURE — 71250 CT THORAX DX C-: CPT

## 2023-03-08 RX ORDER — ATORVASTATIN CALCIUM 10 MG/1
TABLET, FILM COATED ORAL
Qty: 90 TABLET | Refills: 3 | Status: SHIPPED | OUTPATIENT
Start: 2023-03-08

## 2023-03-08 RX ORDER — METFORMIN HYDROCHLORIDE 500 MG/1
TABLET, EXTENDED RELEASE ORAL
Qty: 180 TABLET | Refills: 3 | Status: SHIPPED | OUTPATIENT
Start: 2023-03-08

## 2023-03-08 NOTE — TELEPHONE ENCOUNTER
Medication:   Requested Prescriptions     Pending Prescriptions Disp Refills    metFORMIN (GLUCOPHAGE-XR) 500 MG extended release tablet [Pharmacy Med Name: METFORMIN ER TAB 500MG GP] 180 tablet 3     Sig: TAKE 2 TABLETS DAILY WITH  SUPPER    atorvastatin (LIPITOR) 10 MG tablet [Pharmacy Med Name: ATORVASTATIN TAB 10MG] 90 tablet 3     Sig: TAKE 1 TABLET DAILY       Last Filled: 3/14/2022 both medications      Patient Phone Number: [phone] (home)     Last appt: 10/25/2022   Next appt: Visit date not found    Last Lipid:   Lab Results   Component Value Date/Time    CHOL 137 02/11/2022 08:34 AM    TRIG 128 02/11/2022 08:34 AM    HDL 66 09/26/2022 09:40 AM    LDLCALC 76 09/26/2022 09:40 AM

## 2023-03-12 SDOH — ECONOMIC STABILITY: FOOD INSECURITY: WITHIN THE PAST 12 MONTHS, YOU WORRIED THAT YOUR FOOD WOULD RUN OUT BEFORE YOU GOT MONEY TO BUY MORE.: NEVER TRUE

## 2023-03-12 SDOH — ECONOMIC STABILITY: FOOD INSECURITY: WITHIN THE PAST 12 MONTHS, THE FOOD YOU BOUGHT JUST DIDN'T LAST AND YOU DIDN'T HAVE MONEY TO GET MORE.: NEVER TRUE

## 2023-03-12 SDOH — ECONOMIC STABILITY: HOUSING INSECURITY
IN THE LAST 12 MONTHS, WAS THERE A TIME WHEN YOU DID NOT HAVE A STEADY PLACE TO SLEEP OR SLEPT IN A SHELTER (INCLUDING NOW)?: NO

## 2023-03-12 SDOH — ECONOMIC STABILITY: INCOME INSECURITY: HOW HARD IS IT FOR YOU TO PAY FOR THE VERY BASICS LIKE FOOD, HOUSING, MEDICAL CARE, AND HEATING?: NOT HARD AT ALL

## 2023-03-14 NOTE — PROGRESS NOTES
Marleen Arias is a [de-identified] y.o. female. HPI:  Multiple medical issues, here for 6-month follow-up, med review and refills  Hypertension, prediabetes, COPD, hypothyroidism    Pressure mildly elevated  Still struggling with vertigo, recommend vestibular specialist to help her,  Symptoms usually with head movement    Labs from September 2022 reviewed with patient in detail    Immunizations up-to-date    Wants to change blood pressure medication due to expense  She will send me a list that is covered by her insurance and we will make adjustments    Has follow-up appointment with pulmonary    Meds, vitamins and allergies reviewed with pt  Wt Readings from Last 3 Encounters:   03/15/23 191 lb 3.2 oz (86.7 kg)   11/08/22 189 lb (85.7 kg)   10/25/22 188 lb (85.3 kg)       REVIEW OF SYSTEMS:   CONSTITUTIONAL: See history of present illness,   Weight noted   HEENT: No new vision difficulties or ringing in the ears. RESPIRATORY: Ongoing cough with some shortness of breath  CARDIOVASCULAR: no CP, palpitations or SOB with exertion, inhalers not helping  GI: No nausea, vomiting, diarrhea, constipation, abdominal pain or changes in bowel habits. : No urinary frequency, urgency, incontinence hematuria or dysuria. SKIN: No cyanosis or skin lesions. MUSCULOSKELETAL: No new muscle or joint pain. NEUROLOGICAL: No syncope or TIA-like symptoms. Intermittent vertigo fairly frequently with head movement  PSYCHIATRIC: No anxiety, insomnia or depression     Allergies   Allergen Reactions    Ceftin [Cefuroxime Axetil]      Diarrhea    Norvasc [Amlodipine] Swelling     Lower extremity edema       Prior to Visit Medications    Medication Sig Taking?  Authorizing Provider   metFORMIN (GLUCOPHAGE-XR) 500 MG extended release tablet TAKE 2 TABLETS DAILY WITH  SUPPER Yes Rosalie Mujica MD   atorvastatin (LIPITOR) 10 MG tablet TAKE 1 TABLET DAILY Yes Rosalie Mujica MD   olmesartan-hydroCHLOROthiazide (BENICAR HCT) 40-12.5 MG per tablet Take 1 tablet by mouth daily Yes Ana Ayoub MD   fluticasone-umeclidin-vilant (TRELEGY ELLIPTA) 100-62.5-25 MCG/INH AEPB Inhale 1 puff into the lungs daily 1 box  LOT # [id number]  EXP 2024 Yes Ana Ayoub MD   loratadine (CLARITIN) 10 MG tablet Take 1 tablet by mouth daily Yes Ana Ayoub MD   montelukast (SINGULAIR) 10 MG tablet Take 1 tablet by mouth daily Yes Navdeep Ramírez MD   levothyroxine (SYNTHROID) 75 MCG tablet Take 1 tablet by mouth Daily Yes Ana Ayoub MD   latanoprost (XALATAN) 0.005 % ophthalmic solution  Yes Historical Provider, MD   calcium citrate-vitamin D (CITRICAL + D) 315-250 MG-UNIT TABS Take 2 tablets by mouth. Yes Historical Provider, MD   aspirin 81 MG tablet Take 81 mg by mouth daily. Yes Historical Provider, MD       Past Medical History:   Diagnosis Date    CAD (coronary artery disease)     Hypercholesterolemia     Hypertension     Pericarditis     history of    Vertigo        Social History     Tobacco Use    Smoking status: Former     Packs/day: 1.00     Years: 4.00     Pack years: 4.00     Types: Cigarettes     Quit date: 1965     Years since quittin.7    Smokeless tobacco: Never    Tobacco comments:     stopped using cig many years ago late teens early 19's   Substance Use Topics    Alcohol use: Yes     Comment: seldom       Family History   Problem Relation Age of Onset    Dementia Mother     Pancreatic Cancer Father        OBJECTIVE:  /86   Pulse 89   Resp 16   Ht 5' 4\" (1.626 m)   Wt 191 lb 3.2 oz (86.7 kg)   SpO2 92%   BMI 32.82 kg/m²   GEN:  in NAD  HEENT:  NCAT, TMs:normal and throat: clear  NECK:  Supple without adenopathy. CV:  Regular rate and rhythm, S1 and S2 normal, no murmurs, clicks  PULM:  Chest is clear, no wheezing ,  symmetric air entry throughout both lung fields. ABD: Soft, NT  EXT: No rash or edema  NEURO: Alert oriented ×3, nonfocal     Chest CT and PFTs reviewed    ASSESSMENT/PLAN:  1.  Essential hypertension  What elevated, too costly on olmesartan HCT  Patient to get me a list of what is covered by her insurance and we will make substitutions    2. Vertigo  Vertigo still bothering her with certain head movements, referral to vestibular therapist, patient asking for 2001 m2fx  - External Referral To Physical Therapy    3. Mixed hyperlipidemia  Stable on Lipitor 10 mg nightly, continue    4. Prediabetes  Stable on twice daily metformin, continue  Healthy diet and exercise  - POCT glycosylated hemoglobin (Hb A1C)    5. Acquired hypothyroidism  Stable on thyroid replacement 75 mcg daily    6.  Restrictive airway disease  Upcoming appointment with pulmonary  Advair inhaler, albuterol inhaler, Trelegy inhaler have not helped      30 Total Minutes spent pre charting (reviewing problem list, meds, any test results, consultant and hospital notes ) and  obtaining present visit history, performing appropriate medical exam/evaluation, counseling and educating the patient (and family), ordering medications ,tests, and procedures as needed, refilling medication(s), placing referral(s) when needed in addition to coordinating care for this patient and documenting in electronic health record

## 2023-03-15 ENCOUNTER — OFFICE VISIT (OUTPATIENT)
Dept: FAMILY MEDICINE CLINIC | Age: 83
End: 2023-03-15

## 2023-03-15 VITALS
HEIGHT: 64 IN | WEIGHT: 191.2 LBS | BODY MASS INDEX: 32.64 KG/M2 | DIASTOLIC BLOOD PRESSURE: 86 MMHG | OXYGEN SATURATION: 92 % | SYSTOLIC BLOOD PRESSURE: 130 MMHG | HEART RATE: 89 BPM | RESPIRATION RATE: 16 BRPM

## 2023-03-15 DIAGNOSIS — R42 VERTIGO: ICD-10-CM

## 2023-03-15 DIAGNOSIS — E03.9 ACQUIRED HYPOTHYROIDISM: ICD-10-CM

## 2023-03-15 DIAGNOSIS — J98.4 RESTRICTIVE AIRWAY DISEASE: ICD-10-CM

## 2023-03-15 DIAGNOSIS — R73.03 PREDIABETES: ICD-10-CM

## 2023-03-15 DIAGNOSIS — E78.2 MIXED HYPERLIPIDEMIA: ICD-10-CM

## 2023-03-15 DIAGNOSIS — I10 ESSENTIAL HYPERTENSION: Primary | ICD-10-CM

## 2023-03-15 LAB — HBA1C MFR BLD: 6.4 %

## 2023-03-15 RX ORDER — IRBESARTAN AND HYDROCHLOROTHIAZIDE 150; 12.5 MG/1; MG/1
1 TABLET, FILM COATED ORAL DAILY
Qty: 90 TABLET | Refills: 1 | Status: SHIPPED | OUTPATIENT
Start: 2023-03-15

## 2023-03-15 ASSESSMENT — PATIENT HEALTH QUESTIONNAIRE - PHQ9
SUM OF ALL RESPONSES TO PHQ QUESTIONS 1-9: 0
1. LITTLE INTEREST OR PLEASURE IN DOING THINGS: 0
2. FEELING DOWN, DEPRESSED OR HOPELESS: 0
SUM OF ALL RESPONSES TO PHQ QUESTIONS 1-9: 0
SUM OF ALL RESPONSES TO PHQ9 QUESTIONS 1 & 2: 0

## 2023-03-16 ENCOUNTER — TELEPHONE (OUTPATIENT)
Dept: PULMONOLOGY | Age: 83
End: 2023-03-16

## 2023-03-16 NOTE — TELEPHONE ENCOUNTER
Patient called and had a few questions regarding her medications.      Trelegy and Singulair     PH: [phone]

## 2023-03-16 NOTE — TELEPHONE ENCOUNTER
Patient has been taking Trelegy and Singulair since September she still  is sob and still has a cough nothing seems better since starting the medication.  Please advise

## 2023-03-21 ENCOUNTER — OFFICE VISIT (OUTPATIENT)
Dept: PULMONOLOGY | Age: 83
End: 2023-03-21
Payer: MEDICARE

## 2023-03-21 VITALS
HEART RATE: 97 BPM | BODY MASS INDEX: 32.61 KG/M2 | SYSTOLIC BLOOD PRESSURE: 128 MMHG | HEIGHT: 64 IN | WEIGHT: 191 LBS | OXYGEN SATURATION: 92 % | DIASTOLIC BLOOD PRESSURE: 80 MMHG

## 2023-03-21 DIAGNOSIS — J47.9 BRONCHIECTASIS WITHOUT COMPLICATION (HCC): ICD-10-CM

## 2023-03-21 DIAGNOSIS — R06.09 DOE (DYSPNEA ON EXERTION): Primary | ICD-10-CM

## 2023-03-21 DIAGNOSIS — R91.1 LUNG NODULE: ICD-10-CM

## 2023-03-21 PROCEDURE — 3079F DIAST BP 80-89 MM HG: CPT | Performed by: INTERNAL MEDICINE

## 2023-03-21 PROCEDURE — 1123F ACP DISCUSS/DSCN MKR DOCD: CPT | Performed by: INTERNAL MEDICINE

## 2023-03-21 PROCEDURE — G8399 PT W/DXA RESULTS DOCUMENT: HCPCS | Performed by: INTERNAL MEDICINE

## 2023-03-21 PROCEDURE — 1036F TOBACCO NON-USER: CPT | Performed by: INTERNAL MEDICINE

## 2023-03-21 PROCEDURE — G8417 CALC BMI ABV UP PARAM F/U: HCPCS | Performed by: INTERNAL MEDICINE

## 2023-03-21 PROCEDURE — G8484 FLU IMMUNIZE NO ADMIN: HCPCS | Performed by: INTERNAL MEDICINE

## 2023-03-21 PROCEDURE — 3074F SYST BP LT 130 MM HG: CPT | Performed by: INTERNAL MEDICINE

## 2023-03-21 PROCEDURE — 99214 OFFICE O/P EST MOD 30 MIN: CPT | Performed by: INTERNAL MEDICINE

## 2023-03-21 PROCEDURE — 1090F PRES/ABSN URINE INCON ASSESS: CPT | Performed by: INTERNAL MEDICINE

## 2023-03-21 PROCEDURE — G8427 DOCREV CUR MEDS BY ELIG CLIN: HCPCS | Performed by: INTERNAL MEDICINE

## 2023-03-21 ASSESSMENT — ENCOUNTER SYMPTOMS
SHORTNESS OF BREATH: 1
SINUS PRESSURE: 0
RHINORRHEA: 0
BLOOD IN STOOL: 0
VOICE CHANGE: 0
CHEST TIGHTNESS: 0
ABDOMINAL PAIN: 0
STRIDOR: 0
WHEEZING: 0
APNEA: 0
ABDOMINAL DISTENTION: 0
CHOKING: 0
ANAL BLEEDING: 0
DIARRHEA: 0
SORE THROAT: 0
COUGH: 1
BACK PAIN: 0
CONSTIPATION: 0

## 2023-03-21 NOTE — PROGRESS NOTES
Tisha White    YOB: 1940     Date of Service:  3/21/2023     Chief Complaint   Patient presents with    Cough    Shortness of Breath         HPI     Allergies   Allergen Reactions    Ceftin [Cefuroxime Axetil]      Diarrhea    Norvasc [Amlodipine] Swelling     Lower extremity edema     Outpatient Medications Marked as Taking for the 3/21/23 encounter (Office Visit) with Fredna Sandifer, MD   Medication Sig Dispense Refill    irbesartan-hydroCHLOROthiazide (Elyn Ganong) 150-12.5 MG per tablet Take 1 tablet by mouth daily 90 tablet 1    metFORMIN (GLUCOPHAGE-XR) 500 MG extended release tablet TAKE 2 TABLETS DAILY WITH  SUPPER 180 tablet 3    atorvastatin (LIPITOR) 10 MG tablet TAKE 1 TABLET DAILY 90 tablet 3    fluticasone-umeclidin-vilant (TRELEGY ELLIPTA) 100-62.5-25 MCG/INH AEPB Inhale 1 puff into the lungs daily 1 box  LOT # [id number]  EXP Feb 2024 1 each 0    loratadine (CLARITIN) 10 MG tablet Take 1 tablet by mouth daily 30 tablet 1    montelukast (SINGULAIR) 10 MG tablet Take 1 tablet by mouth daily 90 tablet 1    levothyroxine (SYNTHROID) 75 MCG tablet Take 1 tablet by mouth Daily 90 tablet 3    latanoprost (XALATAN) 0.005 % ophthalmic solution       calcium citrate-vitamin D (CITRICAL + D) 315-250 MG-UNIT TABS Take 2 tablets by mouth. aspirin 81 MG tablet Take 81 mg by mouth daily.            Immunization History   Administered Date(s) Administered    COVID-19, PFIZER Bivalent BOOSTER, DO NOT Dilute, (age 12y+), IM, 30 mcg/0.3 mL 10/18/2022    COVID-19, PFIZER PURPLE top, DILUTE for use, (age 15 y+), 30mcg/0.3mL 01/31/2021, 02/22/2021, 11/14/2021    Influenza Virus Vaccine 10/22/2014, 10/22/2014    Influenza, FLUAD, (age 72 y+), Adjuvanted, 0.5mL 10/15/2020, 10/25/2022    Influenza, High Dose (Fluzone 65 yrs and older) 12/11/2015, 10/28/2016, 10/28/2016, 10/25/2017, 10/20/2018, 10/06/2019, 11/04/2021, 11/04/2021    Pneumococcal Conjugate 13-valent Dedrick Quevedo) 02/13/2015
vaccine  Completed    Pneumococcal 65+ years Vaccine  Completed    COVID-19 Vaccine  Completed    Hepatitis A vaccine  Aged Out    Hib vaccine  Aged Out    Meningococcal (ACWY) vaccine  Aged Out          Assessment/Plan:     Diagnosis Orders   1. SALMERON (dyspnea on exertion)  EKG 12 Lead    Cardiac Stress Test - w/Pharm      2. Pulmonary fibrosis/atelectasis    I personally reviewed patient's CT chest performed on 3/7, which shows evidence of atelectasis/pulmonary fibrosis particularly at the right base. 7 mm pleural-based pulmonary nodule is stable. There are signs of granulomatous lung disease and bronchiectasis of the right lower lobe. Given persistence of patient's symptoms, we will perform bronchoscopy and bronchial lavage of right lower lobe bronchus to rule out any chronic infections such as MAC or histoplasmosis. Methacholine challenge test revealed positive response only at level 5 of the methacholine challenge. This is unlikely to represent asthma or reactive airway disease. Patient has already failed treatment with airduo and Trelegy-hence we will stop the inhalers. Patient will also stop Singulair. SALMERON could also be related to CAD, scant smoking history only. We will request for pharmacologic stress test and obtain a twelve-lead EKG. Return in about 1 month (around 4/21/2023).

## 2023-03-24 NOTE — PROGRESS NOTES
Patient reached ___x_ yes  _____ no   VM instructions left ____ yes   phone number ________                                ____ no-office notified          Date __2-8-2105_______  Time __0730_____  Arrival _0600_____    Nothing to eat or drink after midnight. Responsible adult 25 or older to stay on site while you are here-drive you home-stay with you after. Follow any instructions your doctors office has given you. Bring a complete list of all your medications and supplements including name,dose,how often taken the day of your procedure  If you normally take the following medications in the morning please do so the AM of your procedure with a small sip of water       Heart,blood pressure,seizure,thyroid or breathing medications-use your inhalers-bring any rescue inhalers with you DOS       DO NOT take blood pressure medications ending in \"nahum\" or \"pril\" the AM of procedure or evening prior. Do not take any diabetic medications the AM of procedure  Follow your doctors instructions regarding stopping or taking  any blood thinners-if you do not have instructions-call them  Any questions call your doctor    VISITOR POLICY(subject to change)             The current policy is 2 visitors per patient. There are no children allowed. Mask at discretion of facility. Visiting hours are 8a-8p. Overnight visitors will be at the discretion of the nurse. All policies are subject to change.

## 2023-03-28 ENCOUNTER — HOSPITAL ENCOUNTER (OUTPATIENT)
Dept: NON INVASIVE DIAGNOSTICS | Age: 83
Discharge: HOME | End: 2023-03-28
Payer: MEDICARE

## 2023-03-28 ENCOUNTER — TELEPHONE (OUTPATIENT)
Dept: CARDIOLOGY CLINIC | Age: 83
End: 2023-03-28

## 2023-03-28 DIAGNOSIS — R06.09 DOE (DYSPNEA ON EXERTION): ICD-10-CM

## 2023-03-28 LAB
EKG ATRIAL RATE: 82 BPM
EKG DIAGNOSIS: NORMAL
EKG P AXIS: 56 DEGREES
EKG P-R INTERVAL: 178 MS
EKG Q-T INTERVAL: 356 MS
EKG QRS DURATION: 72 MS
EKG QTC CALCULATION (BAZETT): 415 MS
EKG R AXIS: 3 DEGREES
EKG T AXIS: 25 DEGREES
EKG VENTRICULAR RATE: 82 BPM
LV EF: 65 %
LVEF MODALITY: NORMAL

## 2023-03-28 PROCEDURE — 93017 CV STRESS TEST TRACING ONLY: CPT | Performed by: INTERNAL MEDICINE

## 2023-03-28 PROCEDURE — 93010 ELECTROCARDIOGRAM REPORT: CPT | Performed by: INTERNAL MEDICINE

## 2023-03-28 PROCEDURE — A9502 TC99M TETROFOSMIN: HCPCS | Performed by: INTERNAL MEDICINE

## 2023-03-28 PROCEDURE — 78452 HT MUSCLE IMAGE SPECT MULT: CPT

## 2023-03-28 PROCEDURE — 93005 ELECTROCARDIOGRAM TRACING: CPT | Performed by: INTERNAL MEDICINE

## 2023-03-28 PROCEDURE — 6360000002 HC RX W HCPCS: Performed by: INTERNAL MEDICINE

## 2023-03-28 PROCEDURE — 3430000000 HC RX DIAGNOSTIC RADIOPHARMACEUTICAL: Performed by: INTERNAL MEDICINE

## 2023-03-28 RX ADMIN — TETROFOSMIN 10 MILLICURIE: 1.38 INJECTION, POWDER, LYOPHILIZED, FOR SOLUTION INTRAVENOUS at 07:44

## 2023-03-28 RX ADMIN — REGADENOSON 0.4 MG: 0.08 INJECTION, SOLUTION INTRAVENOUS at 08:58

## 2023-03-28 RX ADMIN — TETROFOSMIN 30 MILLICURIE: 1.38 INJECTION, POWDER, LYOPHILIZED, FOR SOLUTION INTRAVENOUS at 09:07

## 2023-03-28 NOTE — PROGRESS NOTES
Instructed on Lexiscan Stress Test Procedure including possible side effects/ adverse reactions. Patient verbalizes  understanding and denies having any questions . See [address] [place] [address] Cardiology

## 2023-04-03 ENCOUNTER — ANESTHESIA EVENT (OUTPATIENT)
Dept: ENDOSCOPY | Age: 83
End: 2023-04-03
Payer: MEDICARE

## 2023-04-04 ENCOUNTER — ANESTHESIA (OUTPATIENT)
Dept: ENDOSCOPY | Age: 83
End: 2023-04-04
Payer: MEDICARE

## 2023-04-04 ENCOUNTER — HOSPITAL ENCOUNTER (OUTPATIENT)
Age: 83
Setting detail: OUTPATIENT SURGERY
Discharge: HOME | End: 2023-04-04
Attending: INTERNAL MEDICINE | Admitting: INTERNAL MEDICINE
Payer: MEDICARE

## 2023-04-04 VITALS
WEIGHT: 190 LBS | HEIGHT: 64 IN | DIASTOLIC BLOOD PRESSURE: 76 MMHG | RESPIRATION RATE: 23 BRPM | HEART RATE: 87 BPM | BODY MASS INDEX: 32.44 KG/M2 | SYSTOLIC BLOOD PRESSURE: 133 MMHG | TEMPERATURE: 97.7 F | OXYGEN SATURATION: 95 %

## 2023-04-04 PROBLEM — J84.10 GRANULOMATOUS LUNG DISEASE (HCC): Status: ACTIVE | Noted: 2023-04-04

## 2023-04-04 PROBLEM — R05.3 CHRONIC COUGH: Status: ACTIVE | Noted: 2023-04-04

## 2023-04-04 LAB
APPEARANCE BRONCH: CLEAR
CLOT SPEC QL: ABNORMAL
COLOR BRONCH: COLORLESS
DEPRECATED RDW RBC AUTO: 15.1 % (ref 12.4–15.4)
EOSINOPHIL NFR BRONCH MANUAL: 1 %
GLUCOSE BLD-MCNC: 110 MG/DL (ref 70–99)
GLUCOSE BLD-MCNC: 97 MG/DL (ref 70–99)
HCT VFR BLD AUTO: 36.5 % (ref 36–48)
HGB BLD-MCNC: 12.1 G/DL (ref 12–16)
INR PPP: 1 (ref 0.84–1.16)
MACROPHAGES NFR BRONCH MANUAL: 1 % (ref 90–95)
MCH RBC QN AUTO: 29.7 PG (ref 26–34)
MCHC RBC AUTO-ENTMCNC: 33.1 G/DL (ref 31–36)
MCV RBC AUTO: 89.7 FL (ref 80–100)
NEUTROPHILS NFR BRONCH MANUAL: 98 % (ref 5–10)
NUC CELL # BRONCH MANUAL: 264 /CUMM
PERFORMED ON: ABNORMAL
PERFORMED ON: NORMAL
PLATELET # BLD AUTO: 334 K/UL (ref 135–450)
PMV BLD AUTO: 6.7 FL (ref 5–10.5)
PROTHROMBIN TIME: 13.2 SEC (ref 11.5–14.8)
RBC # BLD AUTO: 4.07 M/UL (ref 4–5.2)
RBC # FLD MANUAL: <1000 /CUMM
SPECIMEN SOURCE: NORMAL
TOTAL CELLS COUNTED BRONCH: 100
WBC # BLD AUTO: 6.4 K/UL (ref 4–11)

## 2023-04-04 PROCEDURE — 87581 M.PNEUMON DNA AMP PROBE: CPT

## 2023-04-04 PROCEDURE — 87305 ASPERGILLUS AG IA: CPT

## 2023-04-04 PROCEDURE — 87541 LEGION PNEUMO DNA AMP PROB: CPT

## 2023-04-04 PROCEDURE — 2709999900 HC NON-CHARGEABLE SUPPLY: Performed by: INTERNAL MEDICINE

## 2023-04-04 PROCEDURE — 7100000001 HC PACU RECOVERY - ADDTL 15 MIN: Performed by: INTERNAL MEDICINE

## 2023-04-04 PROCEDURE — 7100000000 HC PACU RECOVERY - FIRST 15 MIN: Performed by: INTERNAL MEDICINE

## 2023-04-04 PROCEDURE — 31624 DX BRONCHOSCOPE/LAVAGE: CPT | Performed by: INTERNAL MEDICINE

## 2023-04-04 PROCEDURE — 6370000000 HC RX 637 (ALT 250 FOR IP): Performed by: ANESTHESIOLOGY

## 2023-04-04 PROCEDURE — 87798 DETECT AGENT NOS DNA AMP: CPT

## 2023-04-04 PROCEDURE — 7100000010 HC PHASE II RECOVERY - FIRST 15 MIN: Performed by: INTERNAL MEDICINE

## 2023-04-04 PROCEDURE — 85610 PROTHROMBIN TIME: CPT

## 2023-04-04 PROCEDURE — 36415 COLL VENOUS BLD VENIPUNCTURE: CPT

## 2023-04-04 PROCEDURE — 87449 NOS EACH ORGANISM AG IA: CPT

## 2023-04-04 PROCEDURE — 87070 CULTURE OTHR SPECIMN AEROBIC: CPT

## 2023-04-04 PROCEDURE — 89051 BODY FLUID CELL COUNT: CPT

## 2023-04-04 PROCEDURE — 88112 CYTOPATH CELL ENHANCE TECH: CPT

## 2023-04-04 PROCEDURE — 88185 FLOWCYTOMETRY/TC ADD-ON: CPT

## 2023-04-04 PROCEDURE — 87102 FUNGUS ISOLATION CULTURE: CPT

## 2023-04-04 PROCEDURE — 88184 FLOWCYTOMETRY/ TC 1 MARKER: CPT

## 2023-04-04 PROCEDURE — 3700000000 HC ANESTHESIA ATTENDED CARE: Performed by: INTERNAL MEDICINE

## 2023-04-04 PROCEDURE — 87116 MYCOBACTERIA CULTURE: CPT

## 2023-04-04 PROCEDURE — 87486 CHLMYD PNEUM DNA AMP PROBE: CPT

## 2023-04-04 PROCEDURE — 87206 SMEAR FLUORESCENT/ACID STAI: CPT

## 2023-04-04 PROCEDURE — 6360000002 HC RX W HCPCS: Performed by: REGISTERED NURSE

## 2023-04-04 PROCEDURE — 3609027000 HC BRONCHOSCOPY: Performed by: INTERNAL MEDICINE

## 2023-04-04 PROCEDURE — 87205 SMEAR GRAM STAIN: CPT

## 2023-04-04 PROCEDURE — 85027 COMPLETE CBC AUTOMATED: CPT

## 2023-04-04 PROCEDURE — 2580000003 HC RX 258: Performed by: REGISTERED NURSE

## 2023-04-04 PROCEDURE — 2500000003 HC RX 250 WO HCPCS: Performed by: REGISTERED NURSE

## 2023-04-04 PROCEDURE — 3700000001 HC ADD 15 MINUTES (ANESTHESIA): Performed by: INTERNAL MEDICINE

## 2023-04-04 PROCEDURE — 6370000000 HC RX 637 (ALT 250 FOR IP): Performed by: INTERNAL MEDICINE

## 2023-04-04 PROCEDURE — 7100000011 HC PHASE II RECOVERY - ADDTL 15 MIN: Performed by: INTERNAL MEDICINE

## 2023-04-04 PROCEDURE — 88305 TISSUE EXAM BY PATHOLOGIST: CPT

## 2023-04-04 RX ORDER — PROPOFOL 10 MG/ML
INJECTION, EMULSION INTRAVENOUS PRN
Status: DISCONTINUED | OUTPATIENT
Start: 2023-04-04 | End: 2023-04-04 | Stop reason: SDUPTHER

## 2023-04-04 RX ORDER — IPRATROPIUM BROMIDE AND ALBUTEROL SULFATE 2.5; .5 MG/3ML; MG/3ML
1 SOLUTION RESPIRATORY (INHALATION) ONCE
Status: COMPLETED | OUTPATIENT
Start: 2023-04-04 | End: 2023-04-04

## 2023-04-04 RX ORDER — FENTANYL CITRATE 50 UG/ML
INJECTION, SOLUTION INTRAMUSCULAR; INTRAVENOUS PRN
Status: DISCONTINUED | OUTPATIENT
Start: 2023-04-04 | End: 2023-04-04 | Stop reason: SDUPTHER

## 2023-04-04 RX ORDER — LIDOCAINE HYDROCHLORIDE 20 MG/ML
INJECTION, SOLUTION EPIDURAL; INFILTRATION; INTRACAUDAL; PERINEURAL PRN
Status: DISCONTINUED | OUTPATIENT
Start: 2023-04-04 | End: 2023-04-04 | Stop reason: SDUPTHER

## 2023-04-04 RX ORDER — PROPOFOL 10 MG/ML
INJECTION, EMULSION INTRAVENOUS CONTINUOUS PRN
Status: DISCONTINUED | OUTPATIENT
Start: 2023-04-04 | End: 2023-04-04 | Stop reason: SDUPTHER

## 2023-04-04 RX ORDER — SODIUM CHLORIDE 9 MG/ML
INJECTION, SOLUTION INTRAVENOUS CONTINUOUS PRN
Status: DISCONTINUED | OUTPATIENT
Start: 2023-04-04 | End: 2023-04-04 | Stop reason: SDUPTHER

## 2023-04-04 RX ORDER — LIDOCAINE HYDROCHLORIDE 40 MG/ML
SOLUTION TOPICAL PRN
Status: DISCONTINUED | OUTPATIENT
Start: 2023-04-04 | End: 2023-04-04 | Stop reason: ALTCHOICE

## 2023-04-04 RX ADMIN — IPRATROPIUM BROMIDE AND ALBUTEROL SULFATE 1 AMPULE: 2.5; .5 SOLUTION RESPIRATORY (INHALATION) at 07:30

## 2023-04-04 RX ADMIN — SODIUM CHLORIDE: 9 INJECTION, SOLUTION INTRAVENOUS at 07:19

## 2023-04-04 RX ADMIN — PROPOFOL 100 MCG/KG/MIN: 10 INJECTION, EMULSION INTRAVENOUS at 07:40

## 2023-04-04 RX ADMIN — LIDOCAINE HYDROCHLORIDE 80 MG: 20 INJECTION, SOLUTION EPIDURAL; INFILTRATION; INTRACAUDAL; PERINEURAL at 07:40

## 2023-04-04 RX ADMIN — FENTANYL CITRATE 25 MCG: 50 INJECTION, SOLUTION INTRAMUSCULAR; INTRAVENOUS at 07:40

## 2023-04-04 RX ADMIN — PROPOFOL 40 MG: 10 INJECTION, EMULSION INTRAVENOUS at 07:40

## 2023-04-04 ASSESSMENT — ENCOUNTER SYMPTOMS: SHORTNESS OF BREATH: 0

## 2023-04-04 ASSESSMENT — PAIN - FUNCTIONAL ASSESSMENT: PAIN_FUNCTIONAL_ASSESSMENT: NONE - DENIES PAIN

## 2023-04-04 NOTE — DISCHARGE INSTRUCTIONS
Additional contraception should be used for 7 days for sugammadex and/or 28 days for emend. These medications have a potential to reduce the effectiveness of hormonal birth control.

## 2023-04-04 NOTE — ANESTHESIA PRE PROCEDURE
Essential hypertension I10    Pericarditis I31.9    Acquired hypothyroidism E03.9    Hyperlipidemia E78.5    Hx: recurrent pneumonia Z87.01    Prediabetes R73.03    Squamous cell carcinoma of lower leg, left C44.729    Need for COVID-19 vaccine Z23    Chronic obstructive pulmonary disease, unspecified J44.9    Restrictive airway disease J98.4       Past Medical History:        Diagnosis Date    Hypercholesterolemia     Hypertension     Pericarditis     history of    Vertigo        Past Surgical History:        Procedure Laterality Date    CATARACT REMOVAL Bilateral 2012    FRACTURE SURGERY      repair    TONSILLECTOMY         Social History:    Social History     Tobacco Use    Smoking status: Former     Packs/day: 1.00     Years: 4.00     Pack years: 4.00     Types: Cigarettes     Quit date: 1965     Years since quittin.8    Smokeless tobacco: Never    Tobacco comments:     stopped using cig many years ago late teens early 19's   Substance Use Topics    Alcohol use: Yes     Comment: seldom                                Counseling given: Not Answered  Tobacco comments: stopped using cig many years ago late teens early       Vital Signs (Current):   Vitals:    23 1459 23 0610   BP:  124/75   Pulse:  78   Resp:  20   Temp:  97.7 °F (36.5 °C)   TempSrc:  Temporal   SpO2:  96%   Weight: 190 lb (86.2 kg) 190 lb (86.2 kg)   Height: 5' 4\" (1.626 m) 5' 4\" (1.626 m)                                              BP Readings from Last 3 Encounters:   23 124/75   23 128/80   03/15/23 130/86       NPO Status: Time of last liquid consumption:                         Time of last solid consumption:                         Date of last liquid consumption: 23                        Date of last solid food consumption: 23    BMI:   Wt Readings from Last 3 Encounters:   23 190 lb (86.2 kg)   23 191 lb (86.6 kg)   03/15/23 191 lb 3.2 oz

## 2023-04-04 NOTE — PROGRESS NOTES
Discharge instructions went over with patient and patient's friend. Patient tolerated sip of water. All personal belongings with patient. [facility] taking patient home in stable condition.

## 2023-04-04 NOTE — ANESTHESIA POSTPROCEDURE EVALUATION
Department of Anesthesiology  Postprocedure Note    Patient: Samuel Rodrigez  MRN: [id number]  YOB: 1940  Date of evaluation: 4/4/2023      Procedure Summary     Date: 04/04/23 Room / Location: [address] [place] [address]    Anesthesia Start: 1040 Anesthesia Stop: 0800    Procedure: BRONCHOSCOPY DIAGNOSTIC OR CELL [address] ONLY Diagnosis:       Cough, unspecified type      (Cough, unspecified type [R05.9])    Surgeons: Ashlie Gilmore MD Responsible Provider: Aye Marion MD    Anesthesia Type: MAC ASA Status: 3          Anesthesia Type: No value filed. Wallace Phase I: Wallace Score: 10    Wallace Phase II:        Anesthesia Post Evaluation    Patient location during evaluation: PACU  Level of consciousness: awake  Airway patency: patent  Complications: no  Cardiovascular status: hemodynamically stable  Respiratory status: acceptable  There was medical reason for not using a multimodal analgesia pain management approach.

## 2023-04-04 NOTE — PROGRESS NOTES
Patient's awake and alert. On room air. NSR on the monitor. VSS. Tolerating ice chips. Denies feeling short of breath. Patient meets criteria to be discharged from phase 1. Will prepare for discharge home in phase 2.

## 2023-04-04 NOTE — H&P
Pt seen and examined. Chronic cough with shortness of breath and evidence of granulomatous lung disease. Plan for bronchoscopy and bronchial lavage to r/o chronic infections such as MAC. No change in H/P, refer to note from 3/21.

## 2023-04-04 NOTE — PROGRESS NOTES
Patient arrived from endo to pacu. Arouses to voice. On 8 L procedural oxygen mask. ST on the monitor. VSS.

## 2023-04-05 LAB
ACID FAST STN SPEC QL: NORMAL
ACID FAST STN SPEC QL: NORMAL
LOEFFLER MB STN SPEC: NORMAL
LOEFFLER MB STN SPEC: NORMAL

## 2023-04-06 LAB
BACTERIA SPEC RESP CULT: NORMAL
BACTERIA SPEC RESP CULT: NORMAL
GRAM STN SPEC: NORMAL
GRAM STN SPEC: NORMAL

## 2023-04-07 LAB
ASPERGILLUS GALACTO AG: NEGATIVE
GALACTOMANNAN AG SERPL IA-ACNC: 0.4

## 2023-04-08 LAB
C PNEUM DNA SPEC QL NAA+PROBE: NOT DETECTED
CHLAMYDIA PNEUMONIAE SOURCE: NORMAL
L PNEUMO DNA SPEC QL NAA+PROBE: NOT DETECTED
LEGIONELLA DNA SPEC NAA+PROBE: NOT DETECTED
SPECIMEN SOURCE: NORMAL

## 2023-04-09 LAB
M PNEUMO DNA SPEC QL NAA+PROBE: NOT DETECTED
SPECIMEN SOURCE: NORMAL

## 2023-04-17 LAB
FUNGUS SPEC CULT: NORMAL
FUNGUS SPEC CULT: NORMAL
LOEFFLER MB STN SPEC: NORMAL
LOEFFLER MB STN SPEC: NORMAL

## 2023-04-18 LAB
ACID FAST STN SPEC QL: NORMAL
ACID FAST STN SPEC QL: NORMAL
MYCOBACTERIUM SPEC CULT: NORMAL
MYCOBACTERIUM SPEC CULT: NORMAL

## 2023-04-20 DIAGNOSIS — H61.20 IMPACTED CERUMEN, UNSPECIFIED LATERALITY: ICD-10-CM

## 2023-04-20 DIAGNOSIS — H92.09 EAR PAIN, REFERRED, UNSPECIFIED LATERALITY: Primary | ICD-10-CM

## 2023-04-20 LAB — MISCELLANEOUS LAB TEST ORDER: NORMAL

## 2023-04-25 ENCOUNTER — TELEPHONE (OUTPATIENT)
Dept: CARDIOLOGY CLINIC | Age: 83
End: 2023-04-25

## 2023-04-25 ENCOUNTER — OFFICE VISIT (OUTPATIENT)
Dept: PULMONOLOGY | Age: 83
End: 2023-04-25
Payer: MEDICARE

## 2023-04-25 VITALS
HEART RATE: 89 BPM | SYSTOLIC BLOOD PRESSURE: 120 MMHG | DIASTOLIC BLOOD PRESSURE: 78 MMHG | BODY MASS INDEX: 32.79 KG/M2 | OXYGEN SATURATION: 92 % | WEIGHT: 191 LBS

## 2023-04-25 DIAGNOSIS — R06.09 DOE (DYSPNEA ON EXERTION): Primary | ICD-10-CM

## 2023-04-25 PROCEDURE — 1090F PRES/ABSN URINE INCON ASSESS: CPT | Performed by: INTERNAL MEDICINE

## 2023-04-25 PROCEDURE — 1036F TOBACCO NON-USER: CPT | Performed by: INTERNAL MEDICINE

## 2023-04-25 PROCEDURE — G8417 CALC BMI ABV UP PARAM F/U: HCPCS | Performed by: INTERNAL MEDICINE

## 2023-04-25 PROCEDURE — G8399 PT W/DXA RESULTS DOCUMENT: HCPCS | Performed by: INTERNAL MEDICINE

## 2023-04-25 PROCEDURE — 1123F ACP DISCUSS/DSCN MKR DOCD: CPT | Performed by: INTERNAL MEDICINE

## 2023-04-25 PROCEDURE — 99213 OFFICE O/P EST LOW 20 MIN: CPT | Performed by: INTERNAL MEDICINE

## 2023-04-25 PROCEDURE — 3074F SYST BP LT 130 MM HG: CPT | Performed by: INTERNAL MEDICINE

## 2023-04-25 PROCEDURE — G8427 DOCREV CUR MEDS BY ELIG CLIN: HCPCS | Performed by: INTERNAL MEDICINE

## 2023-04-25 PROCEDURE — 3078F DIAST BP <80 MM HG: CPT | Performed by: INTERNAL MEDICINE

## 2023-04-25 ASSESSMENT — ENCOUNTER SYMPTOMS
BACK PAIN: 0
VOICE CHANGE: 0
SORE THROAT: 0
COUGH: 0
BLOOD IN STOOL: 0
APNEA: 0
SHORTNESS OF BREATH: 1
RHINORRHEA: 0
CONSTIPATION: 0
SINUS PRESSURE: 0
ABDOMINAL DISTENTION: 0
ANAL BLEEDING: 0
ABDOMINAL PAIN: 0
CHOKING: 0
CHEST TIGHTNESS: 0
DIARRHEA: 0
WHEEZING: 0
STRIDOR: 0

## 2023-04-25 NOTE — PROGRESS NOTES
Last Díaz    YOB: 1940     Date of Service:  4/25/2023     Chief Complaint   Patient presents with    Shortness of Breath     Here for follow up from bronch and stress test         HPI patient has accompanied her friend to our office today for the results of bronchoscopy performed on 4/4. Still continues to be dyspneic even with short distances and activities of daily living. Denies any significant cough or chest pain. Patient has not noticed any difference with discontinuation of inhalers-Trelegy/albuterol and Singulair.     Allergies   Allergen Reactions    Ceftin [Cefuroxime Axetil]      Diarrhea    Norvasc [Amlodipine] Swelling     Lower extremity edema     No outpatient medications have been marked as taking for the 4/25/23 encounter (Office Visit) with Mike Cohen MD.       Immunization History   Administered Date(s) Administered    COVID-19, PFIZER Bivalent BOOSTER, DO NOT Dilute, (age 12y+), IM, 30 mcg/0.3 mL 10/18/2022    COVID-19, PFIZER PURPLE top, DILUTE for use, (age 15 y+), 30mcg/0.3mL 01/31/2021, 02/22/2021, 11/14/2021    Influenza Virus Vaccine 10/22/2014, 10/22/2014    Influenza, FLUAD, (age 72 y+), Adjuvanted, 0.5mL 10/15/2020, 10/25/2022    Influenza, High Dose (Fluzone 65 yrs and older) 12/11/2015, 10/28/2016, 10/28/2016, 10/25/2017, 10/20/2018, 10/06/2019, 11/04/2021, 11/04/2021    Pneumococcal, PCV-13, PREVNAR 15, (age 6w+), IM, 0.5mL 02/13/2015    Pneumococcal, PPSV23, PNEUMOVAX 21, (age 2y+), SC/IM, 0.5mL 02/18/2019    TDaP, ADACEL (age 10y-63y), BOOSTRIX (age 10y+), IM, 0.5mL 07/29/2014    Zoster Recombinant (Shingrix) 05/06/2019, 05/13/2019, 09/12/2019       Past Medical History:   Diagnosis Date    Hypercholesterolemia     Hypertension     Pericarditis     history of    Vertigo      Past Surgical History:   Procedure Laterality Date    BRONCHOSCOPY N/A 4/4/2023    BRONCHOSCOPY DIAGNOSTIC OR CELL [address] ONLY performed by Mike Cohen MD at

## 2023-04-25 NOTE — TELEPHONE ENCOUNTER
Patient was referred by Darin Shafer to the [facility]. Patient has been scheduled with Dr Laney Castillo in [facility] for 4/27/23 at 1:00 pm.  Patient verbalized understanding of appointment instructions. Call complete.

## 2023-04-26 ENCOUNTER — PATIENT MESSAGE (OUTPATIENT)
Dept: FAMILY MEDICINE CLINIC | Age: 83
End: 2023-04-26

## 2023-04-26 DIAGNOSIS — H91.90 HEARING LOSS, UNSPECIFIED HEARING LOSS TYPE, UNSPECIFIED LATERALITY: Primary | ICD-10-CM

## 2023-04-27 ENCOUNTER — PROCEDURE VISIT (OUTPATIENT)
Dept: CARDIOLOGY CLINIC | Age: 83
End: 2023-04-27

## 2023-04-27 ENCOUNTER — OFFICE VISIT (OUTPATIENT)
Dept: CARDIOLOGY CLINIC | Age: 83
End: 2023-04-27

## 2023-04-27 VITALS
BODY MASS INDEX: 32.61 KG/M2 | HEART RATE: 88 BPM | WEIGHT: 191 LBS | OXYGEN SATURATION: 94 % | DIASTOLIC BLOOD PRESSURE: 72 MMHG | SYSTOLIC BLOOD PRESSURE: 124 MMHG | HEIGHT: 64 IN

## 2023-04-27 DIAGNOSIS — R06.02 SHORTNESS OF BREATH: ICD-10-CM

## 2023-04-27 DIAGNOSIS — E78.2 MIXED HYPERLIPIDEMIA: Primary | ICD-10-CM

## 2023-04-27 DIAGNOSIS — I10 ESSENTIAL HYPERTENSION: ICD-10-CM

## 2023-04-27 LAB
LV EF: 55 %
LVEF MODALITY: NORMAL

## 2023-04-27 NOTE — TELEPHONE ENCOUNTER
From: Mariluz Durant  To: Dr. Yee Lofton: 4/26/2023 4:25 PM EDT  Subject: audiology test    I had an earache last week which has subsided but I feel my hearing is not as good as it was. Need referral to see audiologist at [facility]. You had referred me to Dr José Miguel Alvarenga for earache but could not get in until 5/11 but now would like to see an audiologist at that group. Thanks for your help.

## 2023-05-08 ENCOUNTER — PROCEDURE VISIT (OUTPATIENT)
Dept: AUDIOLOGY | Age: 83
End: 2023-05-08
Payer: MEDICARE

## 2023-05-08 DIAGNOSIS — H90.3 SENSORINEURAL HEARING LOSS, BILATERAL: Primary | ICD-10-CM

## 2023-05-08 PROCEDURE — 92557 COMPREHENSIVE HEARING TEST: CPT | Performed by: AUDIOLOGIST

## 2023-05-08 PROCEDURE — 92567 TYMPANOMETRY: CPT | Performed by: AUDIOLOGIST

## 2023-05-08 NOTE — PROGRESS NOTES
Colton Jimenez   1940, [de-identified] y.o. female   [id number]       Referring Provider: Ludy Mcrae MD   Referral Type: In an order in [address] [place] [address]    Reason for Visit: Evaluation of suspected change in hearing    ADULT AUDIOLOGIC EVALUATION      Colton Jimenez is a [de-identified] y.o. female seen today, 5/8/2023 , for an initial audiologic evaluation. AUDIOLOGIC AND OTHER PERTINENT MEDICAL HISTORY:      Colton Jimenez noted a perceived gradual decline in hearing, bilaterally. No additional significant otologic or medical history was reported. Colton Jimenez denied otalgia, aural fullness, otorrhea, tinnitus, dizziness, imbalance, history of falls, history of occupational/recreational noise exposure, history of head trauma, and history of ear surgery. Date: 5/8/2023     IMPRESSIONS:      Today's results revealed a symmetric sensorineural hearing loss with excellent word recognition, bilaterally. Hearing loss significant enough to create hearing difficulty in at least some listening situations. Discussed benefits of amplification. Recommended hearing aid evaluation if interested. ASSESSMENT AND FINDINGS:     Otoscopy revealed: Clear canal in left and non-occluding cerumen in right. Recommended use of DeBrox to soften wax to assist with natural removal.     RIGHT EAR:  Hearing Sensitivity: Mild sloping to a moderately-severe to sensorineural hearing loss  Speech Recognition Threshold: 25 dB HL  Word Recognition: Excellent 100%, based on NU-6 25-word list at 65 dBHL using recorded speech stimuli. Tympanometry: Normal peak pressure and compliance, Type A tympanogram, consistent with normal middle ear function. LEFT EAR:  Hearing Sensitivity: Mild sloping to moderate to severe sensorineural hearing loss. Speech Recognition Threshold: 25 dB HL  Word Recognition: Excellent 100%, based on NU-6 25-word list at 65 dBHL using recorded speech stimuli.     Tympanometry: Normal peak pressure and compliance, Type A

## 2023-07-19 ENCOUNTER — OFFICE VISIT (OUTPATIENT)
Dept: FAMILY MEDICINE CLINIC | Age: 83
End: 2023-07-19

## 2023-07-19 VITALS
RESPIRATION RATE: 16 BRPM | BODY MASS INDEX: 31.99 KG/M2 | DIASTOLIC BLOOD PRESSURE: 83 MMHG | HEIGHT: 64 IN | OXYGEN SATURATION: 95 % | SYSTOLIC BLOOD PRESSURE: 123 MMHG | WEIGHT: 187.4 LBS | HEART RATE: 86 BPM

## 2023-07-19 DIAGNOSIS — Z00.00 MEDICARE ANNUAL WELLNESS VISIT, SUBSEQUENT: Primary | ICD-10-CM

## 2023-07-19 DIAGNOSIS — E03.9 ACQUIRED HYPOTHYROIDISM: ICD-10-CM

## 2023-07-19 DIAGNOSIS — E78.2 MIXED HYPERLIPIDEMIA: ICD-10-CM

## 2023-07-19 DIAGNOSIS — I10 ESSENTIAL HYPERTENSION: ICD-10-CM

## 2023-07-19 DIAGNOSIS — M62.81 MUSCLE WEAKNESS (GENERALIZED): ICD-10-CM

## 2023-07-19 DIAGNOSIS — R73.03 PREDIABETES: ICD-10-CM

## 2023-07-19 DIAGNOSIS — R31.9 HEMATURIA, UNSPECIFIED TYPE: ICD-10-CM

## 2023-07-19 DIAGNOSIS — J41.8 MIXED SIMPLE AND MUCOPURULENT CHRONIC BRONCHITIS (HCC): ICD-10-CM

## 2023-07-19 DIAGNOSIS — R15.2 RECTAL URGENCY: ICD-10-CM

## 2023-07-19 LAB
BILIRUBIN, POC: NORMAL
BLOOD URINE, POC: NORMAL
CLARITY, POC: NORMAL
COLOR, POC: YELLOW
GLUCOSE URINE, POC: NORMAL
KETONES, POC: NORMAL
LEUKOCYTE EST, POC: NORMAL
NITRITE, POC: NORMAL
PH, POC: 5.5
PROTEIN, POC: 30
SPECIFIC GRAVITY, POC: 1.02
UROBILINOGEN, POC: 0.2

## 2023-07-19 RX ORDER — LEVOTHYROXINE SODIUM 0.07 MG/1
75 TABLET ORAL DAILY
Qty: 90 TABLET | Refills: 3 | Status: SHIPPED | OUTPATIENT
Start: 2023-07-19

## 2023-07-19 RX ORDER — IRBESARTAN AND HYDROCHLOROTHIAZIDE 150; 12.5 MG/1; MG/1
1 TABLET, FILM COATED ORAL DAILY
Qty: 90 TABLET | Refills: 3 | Status: SHIPPED | OUTPATIENT
Start: 2023-07-19

## 2023-07-19 ASSESSMENT — PATIENT HEALTH QUESTIONNAIRE - PHQ9
SUM OF ALL RESPONSES TO PHQ9 QUESTIONS 1 & 2: 0
1. LITTLE INTEREST OR PLEASURE IN DOING THINGS: 0
SUM OF ALL RESPONSES TO PHQ QUESTIONS 1-9: 0
2. FEELING DOWN, DEPRESSED OR HOPELESS: 0
SUM OF ALL RESPONSES TO PHQ QUESTIONS 1-9: 0

## 2023-07-19 ASSESSMENT — LIFESTYLE VARIABLES
HOW OFTEN DO YOU HAVE A DRINK CONTAINING ALCOHOL: MONTHLY OR LESS
HOW MANY STANDARD DRINKS CONTAINING ALCOHOL DO YOU HAVE ON A TYPICAL DAY: 1 OR 2

## 2023-07-19 NOTE — PROGRESS NOTES
Medicare Annual Wellness Visit    Yonny Figueredo is here for Medicare AWV    Assessment & Plan   Medicare annual wellness visit, subsequent  Healthy diet, stay active  2 and COVID vaccines this fall, separately    Essential hypertension  Blood pressure stable, continue olmesartan 150/12.5, refill  -     Comprehensive Metabolic Panel, Fasting; Future    Acquired hypothyroidism  Stable onThyroid replacement 75 mcg daily, continue    Mixed hyperlipidemia  Stable on Lipitor 10 mg nightly, continue, recheck labs in the fall  -     Comprehensive Metabolic Panel, Fasting; Future  -     Lipid, Fasting; Future    Prediabetes  Healthy diet and exercise, repeat labs in the fall, A1c stable  -     CBC with Auto Differential; Future  -     Comprehensive Metabolic Panel, Fasting; Future  -     Lipid, Fasting; Future  -     Hemoglobin A1C; Future  -     TSH with Reflex; Future  -     Vitamin B12 & Folate; Future  Mixed simple and mucopurulent chronic bronchitis (HCC)  Work-up is negative, restrictive airway disease    Hematuria, unspecified type    -       POCT Urinalysis no Micro  -     Culture, Urine  Rectal urgency  Referred, interferes with her lifestyle, traveling to see her children who are out in [place] and [address] [place] [address], Analilia Ortiz MD, Colorectal Surgery, [facility]    Muscle weakness (generalized)  Refer to physical therapy for strengthening, at times she feels off balance  -     External Referral To Physical Therapy    Recommendations for Preventive Services Due: see orders and patient instructions/AVS.  Recommended screening schedule for the next 5-10 years is provided to the patient in written form: see Patient Instructions/AVS.     No follow-ups on file. Subjective   The following acute and/or chronic problems were also addressed today:  BP good at home     Patient's complete Health Risk Assessment and screening values have been reviewed and are found in Flowsheets.  The following problems were

## 2023-07-19 NOTE — PATIENT INSTRUCTIONS
Preventive Care list are included within your After Visit Summary for your review. Other Preventive Recommendations:    A preventive eye exam performed by an eye specialist is recommended every 1-2 years to screen for glaucoma; cataracts, macular degeneration, and other eye disorders. A preventive dental visit is recommended every 6 months. Try to get at least 150 minutes of exercise per week or 10,000 steps per day on a pedometer . Order or download the FREE \"Exercise & Physical Activity: Your Everyday Guide\" from [employer]. Call 0-990.211.6495 or search [employer] online. You need 6409-7272 mg of calcium and 7379-0403 IU of vitamin D per day. It is possible to meet your calcium requirement with diet alone, but a vitamin D supplement is usually necessary to meet this goal.  When exposed to the sun, use a sunscreen that protects against both UVA and UVB radiation with an SPF of 30 or greater. Reapply every 2 to 3 hours or after sweating, drying off with a towel, or swimming. Always wear a seat belt when traveling in a car. Always wear a helmet when riding a bicycle or motorcycle.

## 2023-07-21 LAB
BACTERIA UR CULT: ABNORMAL
BACTERIA UR CULT: ABNORMAL
ORGANISM: ABNORMAL
ORGANISM: ABNORMAL

## 2023-07-21 RX ORDER — CIPROFLOXACIN 250 MG/1
250 TABLET, FILM COATED ORAL 2 TIMES DAILY
Qty: 6 TABLET | Refills: 0 | Status: SHIPPED | OUTPATIENT
Start: 2023-07-21 | End: 2023-07-24

## 2023-07-31 ENCOUNTER — PATIENT MESSAGE (OUTPATIENT)
Dept: FAMILY MEDICINE CLINIC | Age: 83
End: 2023-07-31

## 2023-07-31 NOTE — TELEPHONE ENCOUNTER
From: Dee Dee Hernandez  To: Dr. Spain Mix: 7/31/2023 1:39 PM EDT  Subject: Ear wax buildup    At Wellness visit on July 18, you prescribed ear wax removal drops. I have used them 2X daily since then. Twice I have tried to flush out any wax with no evidence of wax in the flush. No change in the feel of my ear and, in fact, my ear has been stopped up causing me to be unable to hear much in that ear. What is next step?

## 2023-08-01 ENCOUNTER — OFFICE VISIT (OUTPATIENT)
Dept: SURGERY | Age: 83
End: 2023-08-01
Payer: MEDICARE

## 2023-08-01 VITALS
WEIGHT: 187 LBS | HEIGHT: 64 IN | DIASTOLIC BLOOD PRESSURE: 98 MMHG | RESPIRATION RATE: 16 BRPM | SYSTOLIC BLOOD PRESSURE: 144 MMHG | BODY MASS INDEX: 31.92 KG/M2 | TEMPERATURE: 97.6 F | OXYGEN SATURATION: 95 % | HEART RATE: 85 BPM

## 2023-08-01 DIAGNOSIS — R15.2 RECTAL URGENCY: Primary | ICD-10-CM

## 2023-08-01 PROCEDURE — 99204 OFFICE O/P NEW MOD 45 MIN: CPT | Performed by: SURGERY

## 2023-08-01 PROCEDURE — 3080F DIAST BP >= 90 MM HG: CPT | Performed by: SURGERY

## 2023-08-01 PROCEDURE — 3077F SYST BP >= 140 MM HG: CPT | Performed by: SURGERY

## 2023-08-01 PROCEDURE — 1090F PRES/ABSN URINE INCON ASSESS: CPT | Performed by: SURGERY

## 2023-08-01 PROCEDURE — G8427 DOCREV CUR MEDS BY ELIG CLIN: HCPCS | Performed by: SURGERY

## 2023-08-01 PROCEDURE — 1123F ACP DISCUSS/DSCN MKR DOCD: CPT | Performed by: SURGERY

## 2023-08-01 PROCEDURE — G8417 CALC BMI ABV UP PARAM F/U: HCPCS | Performed by: SURGERY

## 2023-08-01 PROCEDURE — G8399 PT W/DXA RESULTS DOCUMENT: HCPCS | Performed by: SURGERY

## 2023-08-01 PROCEDURE — 1036F TOBACCO NON-USER: CPT | Performed by: SURGERY

## 2023-08-01 NOTE — PATIENT INSTRUCTIONS
Fecal Incontinence: Information and Care Instructions    Fecal incontinence is the loss of normal control of your bowels. You may not be able to reach the toilet in time for a bowel movement, or stool may leak from your anus. Fecal incontinence can be caused by constipation, diarrhea, or anxiety or other emotional stress. It can also result from nerve injury, muscle damage (especially from childbirth), lack of exercise, or poor diet. Some medications or chronic medical problems (such as diabetes) can cause or worsen incontinence. Treatment of fecal incontinence depends on what caused it and how bad it is. It may include changes to your diet, medicine, bowel training, or surgery. More than one treatment may be needed. Loss of bowel control can be hard to deal with. You may feel ashamed or embarrassed, and you may not want to leave the house because you fear that you might have an accident in public. But treatment can help you better control your bowels and manage your incontinence. Follow-up care is a key part of your treatment and safety. Be sure to make and go to all appointments, and call your doctor if you are having problems. It's also a good idea to know your test results and keep a list of the medicines you take. How can you care for yourself at home? Keep a diary for 2 weeks - record how often you have incontinence, and whether you loose control of solid, liquid, or gas. Write down what you eat. This will help you learn which foods make your incontinence worse. If you and your doctor feel that constipation is part of the problem:  Include fruits, vegetables, beans, and whole grains in your diet each day. These foods are high in fiber. Drink plenty of fluids, enough so that your urine is light yellow or clear like water. If you have kidney, heart, or liver disease and have to limit fluids, talk with your doctor before you increase the amount of fluids you drink. Get some exercise every day.

## 2023-08-01 NOTE — PROGRESS NOTES
Anoscopy performed. Findings reveal: Weakened squeeze and resting tone    Labs reviewed: None  Radiology reviewed: None    Last colonoscopy: Merissa Abernathy, 10 years ago      Assessment/Plan:     A/P:  New problem(s) with uncertain prognosis: Fecal urgency/incontinence  Established problem(s): None  Additional workup/treatment planned: Pelvic floor physical therapy, consider sacral nerve stimulator  Risk of complications/morbidity: Moderate    I had a long discussion with Evangelina Posada regarding pathophysiology, etiology, work-up, natural history, treatment options regarding fecal incontinence and urgency. At this point she has tried medical therapy including fiber supplementation and stool bulking with minimal improvement. The neck step, I recommend pelvic floor physical therapy. She is asked going to physical therapy for another reason, so I think it be very reasonable for her to incorporate pelvic floor training and exercises with this. If she fails this, then sacral nerve stimulator would be appropriate. I discussed with her the procedure, including two-stage approach. I will see her back in 3 to 4 months for reassessment    Continue with current medications    I provided written information in the After Visit Summary AVS Regarding: fecal incontinence    DISPOSITION:  fu 3-4 months    My findings will be relayed to consulting practitioner or PCP via Epic    Note completed using dictation software, please excuse any errors.     Electronically signed by Elva Terry MD on 8/1/2023 at 11:41 AM

## 2023-08-02 ENCOUNTER — OFFICE VISIT (OUTPATIENT)
Dept: FAMILY MEDICINE CLINIC | Age: 83
End: 2023-08-02

## 2023-08-02 VITALS
HEIGHT: 64 IN | OXYGEN SATURATION: 92 % | SYSTOLIC BLOOD PRESSURE: 127 MMHG | BODY MASS INDEX: 32.1 KG/M2 | DIASTOLIC BLOOD PRESSURE: 85 MMHG | RESPIRATION RATE: 16 BRPM | HEART RATE: 90 BPM | WEIGHT: 188 LBS

## 2023-08-02 DIAGNOSIS — H61.22 HEARING LOSS SECONDARY TO CERUMEN IMPACTION, LEFT: Primary | ICD-10-CM

## 2023-08-02 NOTE — PROGRESS NOTES
Brady Galeano is a 82 y.o. female. HPI:  Here to have left ear and canal looked at, feels clogged  Has been using Debrox drops without relief, has actually made it worse  Meds, vitamins and allergies reviewed with pt  Wt Readings from Last 3 Encounters:   08/02/23 188 lb (85.3 kg)   08/01/23 187 lb (84.8 kg)   07/19/23 187 lb 6.4 oz (85 kg)       REVIEW OF SYSTEMS:   CONSTITUTIONAL: See history of present illness,   Weight noted   HEENT: No new vision difficulties or ringing in the ears. RESPIRATORY: No new SOB, PND, orthopnea or cough. CARDIOVASCULAR: no CP, palpitations or SOB with exertion  GI: No nausea, vomiting, diarrhea, constipation, abdominal pain or changes in bowel habits. : No urinary frequency, urgency, incontinence hematuria or dysuria. SKIN: No cyanosis or skin lesions. MUSCULOSKELETAL: No new muscle or joint pain. NEUROLOGICAL: No syncope or TIA-like symptoms. PSYCHIATRIC: No anxiety, insomnia or depression     Allergies   Allergen Reactions    Ceftin [Cefuroxime Axetil]      Diarrhea    Norvasc [Amlodipine] Swelling     Lower extremity edema       Prior to Visit Medications    Medication Sig Taking?  Authorizing Provider   irbesartan-hydroCHLOROthiazide (AVALIDE) 150-12.5 MG per tablet Take 1 tablet by mouth daily Yes Magan Bradley MD   levothyroxine (SYNTHROID) 75 MCG tablet Take 1 tablet by mouth Daily Yes Magan Bradley MD   carbamide peroxide (DEBROX) 6.5 % otic solution Place 5 drops into both ears 2 times daily Yes Magan Bradley MD   metFORMIN (GLUCOPHAGE-XR) 500 MG extended release tablet TAKE 2 TABLETS DAILY WITH  SUPPER Yes Magan Bradley MD   atorvastatin (LIPITOR) 10 MG tablet TAKE 1 TABLET DAILY Yes Magan Bradley MD   loratadine (CLARITIN) 10 MG tablet Take 1 tablet by mouth daily Yes Magan Bradley MD   latanoprost (XALATAN) 0.005 % ophthalmic solution  Yes Historical Provider, MD   calcium citrate-vitamin D (CITRICAL + D) 315-250 MG-UNIT TABS Take 2 tablets by

## 2023-08-09 ENCOUNTER — OFFICE VISIT (OUTPATIENT)
Dept: FAMILY MEDICINE CLINIC | Age: 83
End: 2023-08-09

## 2023-08-09 VITALS
BODY MASS INDEX: 32.37 KG/M2 | DIASTOLIC BLOOD PRESSURE: 84 MMHG | RESPIRATION RATE: 16 BRPM | HEART RATE: 90 BPM | WEIGHT: 189.6 LBS | OXYGEN SATURATION: 92 % | SYSTOLIC BLOOD PRESSURE: 135 MMHG | HEIGHT: 64 IN

## 2023-08-09 DIAGNOSIS — H61.22 IMPACTED CERUMEN OF LEFT EAR: Primary | ICD-10-CM

## 2023-08-09 DIAGNOSIS — Z12.11 COLON CANCER SCREENING: ICD-10-CM

## 2023-08-09 NOTE — PROGRESS NOTES
Yaritza Aparicio is a 82 y.o. female. HPI:  Here for recheck ear wax . .. difficult to remove left ear  Has been using Debrox drops, will try to reirrigate again today    Meds, vitamins and allergies reviewed with pt    Wt Readings from Last 3 Encounters:   08/09/23 189 lb 9.6 oz (86 kg)   08/02/23 188 lb (85.3 kg)   08/01/23 187 lb (84.8 kg)       REVIEW OF SYSTEMS:   CONSTITUTIONAL: See history of present illness,   Weight noted   HEENT: Left ear clogged with wax  RESPIRATORY: No new SOB, PND, orthopnea or cough. CARDIOVASCULAR: no CP, palpitations or SOB with exertion  GI: No nausea, vomiting, diarrhea, constipation, abdominal pain or changes in bowel habits. : No urinary frequency, urgency, incontinence hematuria or dysuria. SKIN: No cyanosis or skin lesions. MUSCULOSKELETAL: No new muscle or joint pain. NEUROLOGICAL: No syncope or TIA-like symptoms. PSYCHIATRIC: No anxiety, insomnia or depression     Allergies   Allergen Reactions    Ceftin [Cefuroxime Axetil]      Diarrhea    Norvasc [Amlodipine] Swelling     Lower extremity edema       Prior to Visit Medications    Medication Sig Taking?  Authorizing Provider   irbesartan-hydroCHLOROthiazide (AVALIDE) 150-12.5 MG per tablet Take 1 tablet by mouth daily Yes Michael Esposito MD   levothyroxine (SYNTHROID) 75 MCG tablet Take 1 tablet by mouth Daily Yes Michael Esposito MD   carbamide peroxide (DEBROX) 6.5 % otic solution Place 5 drops into both ears 2 times daily Yes Michael Esposito MD   metFORMIN (GLUCOPHAGE-XR) 500 MG extended release tablet TAKE 2 TABLETS DAILY WITH  SUPPER Yes Michael Esposito MD   atorvastatin (LIPITOR) 10 MG tablet TAKE 1 TABLET DAILY Yes Michael Esposito MD   loratadine (CLARITIN) 10 MG tablet Take 1 tablet by mouth daily Yes Micheal Esposito MD   latanoprost (XALATAN) 0.005 % ophthalmic solution  Yes Historical Provider, MD   calcium citrate-vitamin D (CITRICAL + D) 315-250 MG-UNIT TABS Take 2 tablets by mouth Yes Historical

## 2023-08-25 LAB — NONINV COLON CA DNA+OCC BLD SCRN STL QL: POSITIVE

## 2023-08-30 ENCOUNTER — TELEPHONE (OUTPATIENT)
Dept: FAMILY MEDICINE CLINIC | Age: 83
End: 2023-08-30

## 2023-09-05 DIAGNOSIS — R19.5 POSITIVE COLORECTAL CANCER SCREENING USING COLOGUARD TEST: Primary | ICD-10-CM

## 2023-10-09 DIAGNOSIS — E78.2 MIXED HYPERLIPIDEMIA: ICD-10-CM

## 2023-10-09 DIAGNOSIS — I10 ESSENTIAL HYPERTENSION: ICD-10-CM

## 2023-10-09 DIAGNOSIS — R73.03 PREDIABETES: ICD-10-CM

## 2023-10-09 LAB
ALBUMIN SERPL-MCNC: 4.5 G/DL (ref 3.4–5)
ALBUMIN/GLOB SERPL: 1.7 {RATIO} (ref 1.1–2.2)
ALP SERPL-CCNC: 114 U/L (ref 40–129)
ALT SERPL-CCNC: 12 U/L (ref 10–40)
ANION GAP SERPL CALCULATED.3IONS-SCNC: 13 MMOL/L (ref 3–16)
AST SERPL-CCNC: 15 U/L (ref 15–37)
BASOPHILS # BLD: 0.1 K/UL (ref 0–0.2)
BASOPHILS NFR BLD: 0.8 %
BILIRUB SERPL-MCNC: 0.4 MG/DL (ref 0–1)
BUN SERPL-MCNC: 25 MG/DL (ref 7–20)
CALCIUM SERPL-MCNC: 9.8 MG/DL (ref 8.3–10.6)
CHLORIDE SERPL-SCNC: 103 MMOL/L (ref 99–110)
CHOLEST SERPL-MCNC: 158 MG/DL (ref 0–199)
CO2 SERPL-SCNC: 27 MMOL/L (ref 21–32)
CREAT SERPL-MCNC: 0.9 MG/DL (ref 0.6–1.2)
DEPRECATED RDW RBC AUTO: 15.6 % (ref 12.4–15.4)
EOSINOPHIL # BLD: 0.2 K/UL (ref 0–0.6)
EOSINOPHIL NFR BLD: 2.8 %
FOLATE SERPL-MCNC: 11.94 NG/ML (ref 4.78–24.2)
GFR SERPLBLD CREATININE-BSD FMLA CKD-EPI: >60 ML/MIN/{1.73_M2}
GLUCOSE P FAST SERPL-MCNC: 96 MG/DL (ref 70–99)
HCT VFR BLD AUTO: 41.2 % (ref 36–48)
HDLC SERPL-MCNC: 69 MG/DL (ref 40–60)
HGB BLD-MCNC: 13.5 G/DL (ref 12–16)
LDL CHOLESTEROL CALCULATED: 72 MG/DL
LYMPHOCYTES # BLD: 1.6 K/UL (ref 1–5.1)
LYMPHOCYTES NFR BLD: 20.6 %
MCH RBC QN AUTO: 29.7 PG (ref 26–34)
MCHC RBC AUTO-ENTMCNC: 32.8 G/DL (ref 31–36)
MCV RBC AUTO: 90.7 FL (ref 80–100)
MONOCYTES # BLD: 0.7 K/UL (ref 0–1.3)
MONOCYTES NFR BLD: 9.1 %
NEUTROPHILS # BLD: 5.2 K/UL (ref 1.7–7.7)
NEUTROPHILS NFR BLD: 66.7 %
PLATELET # BLD AUTO: 358 K/UL (ref 135–450)
PMV BLD AUTO: 7.2 FL (ref 5–10.5)
POTASSIUM SERPL-SCNC: 5 MMOL/L (ref 3.5–5.1)
PROT SERPL-MCNC: 7.1 G/DL (ref 6.4–8.2)
RBC # BLD AUTO: 4.54 M/UL (ref 4–5.2)
SODIUM SERPL-SCNC: 143 MMOL/L (ref 136–145)
TRIGL SERPL-MCNC: 85 MG/DL (ref 0–150)
TSH SERPL DL<=0.005 MIU/L-ACNC: 3.48 UIU/ML (ref 0.27–4.2)
VIT B12 SERPL-MCNC: 342 PG/ML (ref 211–911)
VLDLC SERPL CALC-MCNC: 17 MG/DL
WBC # BLD AUTO: 7.8 K/UL (ref 4–11)

## 2023-10-10 LAB
EST. AVERAGE GLUCOSE BLD GHB EST-MCNC: 131.2 MG/DL
HBA1C MFR BLD: 6.2 %

## 2023-11-09 ENCOUNTER — HOSPITAL ENCOUNTER (OUTPATIENT)
Dept: WOMENS IMAGING | Age: 83
Discharge: HOME | End: 2023-11-09
Payer: MEDICARE

## 2023-11-09 DIAGNOSIS — Z12.31 VISIT FOR SCREENING MAMMOGRAM: ICD-10-CM

## 2023-11-09 PROCEDURE — 77067 SCR MAMMO BI INCL CAD: CPT

## 2024-01-11 ENCOUNTER — OFFICE VISIT (OUTPATIENT)
Dept: SURGERY | Age: 84
End: 2024-01-11
Payer: MEDICARE

## 2024-01-11 VITALS
OXYGEN SATURATION: 90 % | DIASTOLIC BLOOD PRESSURE: 88 MMHG | SYSTOLIC BLOOD PRESSURE: 128 MMHG | HEART RATE: 92 BPM | WEIGHT: 190 LBS | BODY MASS INDEX: 32.61 KG/M2

## 2024-01-11 DIAGNOSIS — R15.2 RECTAL URGENCY: Primary | ICD-10-CM

## 2024-01-11 PROCEDURE — 1123F ACP DISCUSS/DSCN MKR DOCD: CPT | Performed by: SURGERY

## 2024-01-11 PROCEDURE — G8427 DOCREV CUR MEDS BY ELIG CLIN: HCPCS | Performed by: SURGERY

## 2024-01-11 PROCEDURE — G8417 CALC BMI ABV UP PARAM F/U: HCPCS | Performed by: SURGERY

## 2024-01-11 PROCEDURE — 3074F SYST BP LT 130 MM HG: CPT | Performed by: SURGERY

## 2024-01-11 PROCEDURE — G8399 PT W/DXA RESULTS DOCUMENT: HCPCS | Performed by: SURGERY

## 2024-01-11 PROCEDURE — 99213 OFFICE O/P EST LOW 20 MIN: CPT | Performed by: SURGERY

## 2024-01-11 PROCEDURE — 1036F TOBACCO NON-USER: CPT | Performed by: SURGERY

## 2024-01-11 PROCEDURE — G8484 FLU IMMUNIZE NO ADMIN: HCPCS | Performed by: SURGERY

## 2024-01-11 PROCEDURE — 1090F PRES/ABSN URINE INCON ASSESS: CPT | Performed by: SURGERY

## 2024-01-11 PROCEDURE — 3079F DIAST BP 80-89 MM HG: CPT | Performed by: SURGERY

## 2024-01-11 NOTE — PROGRESS NOTES
strength and tone. Normal gait. Nails without clubbing or cyanosis.   Neurological: Alert and oriented to person, place, and time. No gross deficits. Sensation intact.  Skin: Skin is dry. No rashes noted. No pallor. No induration of nodules.  Psychiatric: Normal mood and affect. Behavior normal. Oriented to person, place, and time. Judgment and insight reasonable.    Abdominal/wound: Soft, nontender    Multiple phone calls made to [place] physical therapy centers to help arrange for patient    Assessment/Plan:       A/P:  Established problem(s): Fecal urgency  Additional workup/treatment planned: Trial pelvic floor physical therapy versus sacral nerve stimulator  Risk of complications/morbidity: Moderate    I had a long discussion with Rosi regarding sacral nerve stimulator surgery including risks and expectations.  I do think it is a reasonable option, but she would like to trial pelvic floor physical therapy again.  We made several phone calls to attempt to find her pelvic floor physical therapist near her home in [place].  We will give her the information and I will see her back in 4 months for reassessment    DISPOSITION:  f/u 4 months    My findings will be relayed to consulting practitioner or PCP via Epic note    Note completed using dictation software, please excuse any errors.    Electronically signed by Yusuf Ford MD on 1/11/2024 at 10:55 AM

## 2024-03-19 RX ORDER — ATORVASTATIN CALCIUM 10 MG/1
10 TABLET, FILM COATED ORAL DAILY
Qty: 90 TABLET | Refills: 1 | Status: SHIPPED | OUTPATIENT
Start: 2024-03-19

## 2024-03-19 RX ORDER — IRBESARTAN AND HYDROCHLOROTHIAZIDE 150; 12.5 MG/1; MG/1
1 TABLET, FILM COATED ORAL DAILY
Qty: 90 TABLET | Refills: 1 | Status: SHIPPED | OUTPATIENT
Start: 2024-03-19

## 2024-03-19 RX ORDER — LEVOTHYROXINE SODIUM 0.07 MG/1
75 TABLET ORAL DAILY
Qty: 90 TABLET | Refills: 1 | Status: SHIPPED | OUTPATIENT
Start: 2024-03-19

## 2024-03-19 RX ORDER — METFORMIN HYDROCHLORIDE 500 MG/1
TABLET, EXTENDED RELEASE ORAL
Qty: 180 TABLET | Refills: 1 | Status: SHIPPED | OUTPATIENT
Start: 2024-03-19

## 2024-03-19 NOTE — TELEPHONE ENCOUNTER
Prescriptions Disp Refills    metFORMIN (GLUCOPHAGE-XR) 500 MG extended release tablet 180 tablet 3    atorvastatin (LIPITOR) 10 MG tablet 90 tablet 3     Sig: Take 1 tablet by mouth daily    irbesartan-hydroCHLOROthiazide (AVALIDE) 150-12.5 MG per tablet 90 tablet 3     Sig: Take 1 tablet by mouth daily    levothyroxine (SYNTHROID) 75 MCG tablet 90 tablet 3     Sig: Take 1 tablet by mouth Daily       Last Filled:      Patient Phone Number: [phone] (home)     Last appt: 8/9/2023   Next appt: Visit date not found    Last Labs DM:   Lab Results   Component Value Date/Time    LABA1C 6.2 10/09/2023 08:54 AM       Medication:   Requested Prescriptions     Pending Prescriptions Disp Refills    metFORMIN (GLUCOPHAGE-XR) 500 MG extended release tablet 180 tablet 3    atorvastatin (LIPITOR) 10 MG tablet 90 tablet 3     Sig: Take 1 tablet by mouth daily    irbesartan-hydroCHLOROthiazide (AVALIDE) 150-12.5 MG per tablet 90 tablet 3     Sig: Take 1 tablet by mouth daily    levothyroxine (SYNTHROID) 75 MCG tablet 90 tablet 3     Sig: Take 1 tablet by mouth Daily       Last Filled:      Patient Phone Number: [phone] (home)     Last appt: 8/9/2023   Next appt: Visit date not found    Last Thyroid:   Lab Results   Component Value Date/Time    TSH 2.67 09/26/2022 09:40 AM    T4FREE 1.5 09/26/2022 09:40 AM

## 2024-06-13 ENCOUNTER — OFFICE VISIT (OUTPATIENT)
Dept: SURGERY | Age: 84
End: 2024-06-13
Payer: MEDICARE

## 2024-06-13 VITALS
OXYGEN SATURATION: 93 % | RESPIRATION RATE: 16 BRPM | DIASTOLIC BLOOD PRESSURE: 87 MMHG | HEART RATE: 86 BPM | SYSTOLIC BLOOD PRESSURE: 140 MMHG | TEMPERATURE: 98.1 F

## 2024-06-13 DIAGNOSIS — R15.2 RECTAL URGENCY: Primary | ICD-10-CM

## 2024-06-13 PROCEDURE — 1090F PRES/ABSN URINE INCON ASSESS: CPT | Performed by: SURGERY

## 2024-06-13 PROCEDURE — 3077F SYST BP >= 140 MM HG: CPT | Performed by: SURGERY

## 2024-06-13 PROCEDURE — G8427 DOCREV CUR MEDS BY ELIG CLIN: HCPCS | Performed by: SURGERY

## 2024-06-13 PROCEDURE — 99213 OFFICE O/P EST LOW 20 MIN: CPT | Performed by: SURGERY

## 2024-06-13 PROCEDURE — G8417 CALC BMI ABV UP PARAM F/U: HCPCS | Performed by: SURGERY

## 2024-06-13 PROCEDURE — 1123F ACP DISCUSS/DSCN MKR DOCD: CPT | Performed by: SURGERY

## 2024-06-13 PROCEDURE — G8399 PT W/DXA RESULTS DOCUMENT: HCPCS | Performed by: SURGERY

## 2024-06-13 PROCEDURE — 3079F DIAST BP 80-89 MM HG: CPT | Performed by: SURGERY

## 2024-06-13 PROCEDURE — 1036F TOBACCO NON-USER: CPT | Performed by: SURGERY

## 2024-06-13 NOTE — PROGRESS NOTES
pain, or crepitus, with normal strength and tone. Normal gait. Nails without clubbing or cyanosis.   Neurological: Alert and oriented to person, place, and time. No gross deficits. Sensation intact.  Skin: Skin is dry. No rashes noted. No pallor. No induration of nodules.  Psychiatric: Normal mood and affect. Behavior normal. Oriented to person, place, and time. Judgment and insight reasonable.    Abdominal/wound: Soft, nontender    Date of last Colonoscopy: 5/24/2013   Date of last Cologuard: 8/17/2023  No FIT/FOBT on file   No flexible sigmoidoscopy on file      Assessment/Plan:       A/P:  Established problem(s): Fecal urgency, improving  Additional workup/treatment planned: Continue pelvic floor physical therapy, consider sacral nerve stimulator at some point in future as needed  Risk of complications/morbidity: Moderate    Fortunately Rosi has had some improvement in her fecal urgency with pelvic floor physical therapy.  She would like to continue with this and is not particularly interested in sacral nerve stimulator procedure if not absolutely needed.  Discussed with her indications for it down the road.    Continue with current prescription medications    DISPOSITION: Follow-up as needed    My findings will be relayed to consulting practitioner or PCP via Epic note    Note completed using dictation software, please excuse any errors.    Electronically signed by Yusuf Ford MD on 6/13/2024 at 11:01 AM

## 2024-07-20 ASSESSMENT — LIFESTYLE VARIABLES
HOW OFTEN DO YOU HAVE A DRINK CONTAINING ALCOHOL: 2
HOW OFTEN DO YOU HAVE SIX OR MORE DRINKS ON ONE OCCASION: 1
HOW MANY STANDARD DRINKS CONTAINING ALCOHOL DO YOU HAVE ON A TYPICAL DAY: 98

## 2024-07-23 ENCOUNTER — OFFICE VISIT (OUTPATIENT)
Dept: FAMILY MEDICINE CLINIC | Age: 84
End: 2024-07-23
Payer: MEDICARE

## 2024-07-23 VITALS
HEIGHT: 65 IN | BODY MASS INDEX: 30.26 KG/M2 | TEMPERATURE: 97.5 F | HEART RATE: 90 BPM | OXYGEN SATURATION: 96 % | WEIGHT: 181.6 LBS | SYSTOLIC BLOOD PRESSURE: 127 MMHG | DIASTOLIC BLOOD PRESSURE: 85 MMHG

## 2024-07-23 DIAGNOSIS — Z00.00 MEDICARE ANNUAL WELLNESS VISIT, SUBSEQUENT: Primary | ICD-10-CM

## 2024-07-23 DIAGNOSIS — R73.03 PREDIABETES: ICD-10-CM

## 2024-07-23 DIAGNOSIS — J84.10 GRANULOMATOUS LUNG DISEASE (HCC): ICD-10-CM

## 2024-07-23 DIAGNOSIS — E03.9 ACQUIRED HYPOTHYROIDISM: ICD-10-CM

## 2024-07-23 DIAGNOSIS — R15.2 RECTAL URGENCY: ICD-10-CM

## 2024-07-23 DIAGNOSIS — I10 ESSENTIAL HYPERTENSION: ICD-10-CM

## 2024-07-23 DIAGNOSIS — H91.93 DECREASED HEARING OF BOTH EARS: ICD-10-CM

## 2024-07-23 DIAGNOSIS — R05.3 CHRONIC COUGHING: ICD-10-CM

## 2024-07-23 PROBLEM — J44.9 CHRONIC OBSTRUCTIVE PULMONARY DISEASE, UNSPECIFIED (HCC): Status: RESOLVED | Noted: 2022-06-07 | Resolved: 2024-07-23

## 2024-07-23 PROCEDURE — G0439 PPPS, SUBSEQ VISIT: HCPCS | Performed by: FAMILY MEDICINE

## 2024-07-23 PROCEDURE — 3074F SYST BP LT 130 MM HG: CPT | Performed by: FAMILY MEDICINE

## 2024-07-23 PROCEDURE — 3079F DIAST BP 80-89 MM HG: CPT | Performed by: FAMILY MEDICINE

## 2024-07-23 PROCEDURE — 1123F ACP DISCUSS/DSCN MKR DOCD: CPT | Performed by: FAMILY MEDICINE

## 2024-07-23 RX ORDER — FAMOTIDINE 20 MG/1
20 TABLET, FILM COATED ORAL 2 TIMES DAILY
Qty: 60 TABLET | Refills: 3 | Status: SHIPPED | OUTPATIENT
Start: 2024-07-23

## 2024-07-23 RX ORDER — FLUTICASONE PROPIONATE AND SALMETEROL 250; 50 UG/1; UG/1
1 POWDER RESPIRATORY (INHALATION) EVERY 12 HOURS
Qty: 60 EACH | Refills: 3 | Status: SHIPPED | OUTPATIENT
Start: 2024-07-23 | End: 2024-07-24

## 2024-07-23 RX ORDER — INHALER, ASSIST DEVICES
SPACER (EA) MISCELLANEOUS
Qty: 1 EACH | Refills: 0 | Status: SHIPPED | OUTPATIENT
Start: 2024-07-23

## 2024-07-23 RX ORDER — METFORMIN HYDROCHLORIDE 500 MG/1
TABLET, EXTENDED RELEASE ORAL
Qty: 180 TABLET | Refills: 1 | Status: CANCELLED | OUTPATIENT
Start: 2024-07-23

## 2024-07-23 NOTE — PROGRESS NOTES
Medicare Annual Wellness Visit    Rosi Oliver is here for Medicare AWV (Right ankle swells once in a while)    Assessment & Plan   Medicare annual wellness visit, subsequent  Healthy diet, stay active  Immunizations reviewed  Recommend RSV vaccine this summer, flu vaccine in early October    Essential hypertension  Hypertension stable on Avalide 150/12.5 daily, continue    Acquired hypothyroidism  Thyroid stable on 75 mcg each morning before breakfast, continue    Prediabetes  Diet and exercise, continue metformin 2 tablets daily, recheck labs in the fall    Rectal urgency  Had follow-up and plans to continue therapy      Granulomatous lung disease (HCC)  Trial of Advair with AeroChamber    Decreased hearing of both ears  Rescreen  -     Lalitha Belle Au.D, Audiology, [facility]  Chronic coughing  Trial of Advair inhaler with AeroChamber    Recommendations for Preventive Services Due: see orders and patient instructions/AVS.  Recommended screening schedule for the next 5-10 years is provided to the patient in written form: see Patient Instructions/AVS.     No follow-ups on file.     Subjective   The following acute and/or chronic problems were also addressed today:  Healthy diet       Patient's complete Health Risk Assessment and screening values have been reviewed and are found in Flowsheets. The following problems were reviewed today and where indicated follow up appointments were made and/or referrals ordered.    Positive Risk Factor Screenings with Interventions:                 Poor Eating Habits/Diet:  Do you eat balanced/healthy meals regularly?: (!) No  Interventions:  Discussed healthy diet    Abnormal BMI (obese):  Body mass index is 30.69 kg/m². (!) Abnormal  Interventions:  Healthy diet and exercise          Hearing Screen:  Do you or your family notice any trouble with your hearing that hasn't been managed with hearing aids?: (!) Yes    Interventions:  Referred to Audiology last

## 2024-07-23 NOTE — PATIENT INSTRUCTIONS
your risk of heart disease by raising cholesterol levels.     Limit the amount of solid fat--butter, margarine, and shortening--you eat. Use olive, peanut, or canola oil when you cook. Bake, broil, and steam foods instead of frying them.     Eat a variety of fruit and vegetables every day. Dark green, deep orange, red, or yellow fruits and vegetables are especially good for you. Examples include spinach, carrots, peaches, and berries.     Foods high in fiber can reduce your cholesterol and provide important vitamins and minerals. High-fiber foods include whole-grain cereals and breads, oatmeal, beans, brown rice, citrus fruits, and apples.     Eat lean proteins. Heart-healthy proteins include seafood, lean meats and poultry, eggs, beans, peas, nuts, seeds, and soy products.     Limit drinks and foods with added sugar. These include candy, desserts, and soda pop.   Heart-healthy lifestyle    If your doctor recommends it, get more exercise. For many people, walking is a good choice. Or you may want to swim, bike, or do other activities. Bit by bit, increase the time you're active every day. Try for at least 30 minutes on most days of the week.     Try to quit or cut back on using tobacco and other nicotine products. This includes smoking and vaping. If you need help quitting, talk to your doctor about stop-smoking programs and medicines. These can increase your chances of quitting for good. Quitting is one of the most important things you can do to protect your heart. It is never too late to quit. Try to avoid secondhand smoke too.     Stay at a weight that's healthy for you. Talk to your doctor if you need help losing weight.     Try to get 7 to 9 hours of sleep each night.     Limit alcohol to 2 drinks a day for men and 1 drink a day for women. Too much alcohol can cause health problems.     Manage other health problems such as diabetes, high blood pressure, and high cholesterol. If you think you may have a problem

## 2024-07-24 RX ORDER — FLUTICASONE PROPIONATE AND SALMETEROL XINAFOATE 45; 21 UG/1; UG/1
2 AEROSOL, METERED RESPIRATORY (INHALATION) 2 TIMES DAILY
Qty: 1 EACH | Refills: 1 | Status: SHIPPED | OUTPATIENT
Start: 2024-07-24

## 2024-07-24 NOTE — TELEPHONE ENCOUNTER
Medication:   Requested Prescriptions     Pending Prescriptions Disp Refills    fluticasone-salmeterol (ADVAIR HFA) 45-21 MCG/ACT inhaler [Pharmacy Med Name: ADVAIR HFA 45-21 MCG INHALER]  0        Last Filled:  07/23/2024, 60, 3    Patient Phone Number: [phone] (home)     Last appt: 7/23/2024   Next appt: Visit date not found    Last OARRS:        No data to display

## 2024-08-02 NOTE — PROGRESS NOTES
Darla Koyanagi is a 80 y.o. female. HPI:  Here for blood pressure check  Does Not wish to do annual wellness visit today    DEXA scan results reviewed with patient today also    Meds, vitamins and allergies reviewed with pt    Weight loss noted    Blood pressure still elevated  expir wheezing    Wt Readings from Last 3 Encounters:   05/13/22 182 lb (82.6 kg)   02/21/22 194 lb 9.6 oz (88.3 kg)   02/15/22 193 lb (87.5 kg)       REVIEW OF SYSTEMS:   CONSTITUTIONAL: See history of present illness,   Weight noted   HEENT: No new vision difficulties or ringing in the ears. RESPIRATORY: Cough and expiratory wheeze ever since she had COVID back in 20  CARDIOVASCULAR: no CP, palpitations or SOB with exertion  GI: No nausea, vomiting, diarrhea, constipation, abdominal pain or changes in bowel habits. : No urinary frequency, urgency, incontinence hematuria or dysuria. SKIN: No cyanosis or skin lesions. MUSCULOSKELETAL: No new muscle or joint pain. NEUROLOGICAL: No syncope or TIA-like symptoms. PSYCHIATRIC: No anxiety, insomnia or depression     Allergies   Allergen Reactions    Ceftin [Cefuroxime Axetil]      Diarrhea       Prior to Visit Medications    Medication Sig Taking?  Authorizing Provider   olmesartan-hydroCHLOROthiazide (BENICAR HCT) 20-12.5 MG per tablet Take 1 tablet by mouth daily Yes Lobo Arriola MD   Fluticasone-Salmeterol,sensor, (AIRDUO DIGIHALER) 113-14 MCG/ACT AEPB 1 puff twice a day Yes Lobo Arriola MD   levothyroxine (SYNTHROID) 75 MCG tablet TAKE 1 TABLET EVERY MORNINGBEFORE BREAKFAST Yes Lobo Arriola MD   albuterol sulfate HFA (PROVENTIL HFA) 108 (90 Base) MCG/ACT inhaler Inhale 2 puffs into the lungs every 6 hours as needed for Wheezing Yes Wade Tillman MD   dextromethorphan-guaiFENesin (ROBITUSSIN-DM)  MG/5ML syrup Take 10 mLs by mouth every 4 hours as needed for Cough Yes Wade Tillman MD   metFORMIN (GLUCOPHAGE-XR) 500 MG extended release tablet TAKE 2 TABLETS DAILY WITH  SUPPER Yes Dori Medellin MD   atorvastatin (LIPITOR) 10 MG tablet TAKE 1 TABLET DAILY Yes Dori Medellin MD   amLODIPine (NORVASC) 5 MG tablet Take 1 tablet by mouth daily (before dinner) Yes Dori Medellin MD   latanoprost (XALATAN) 0.005 % ophthalmic solution  Yes Historical Provider, MD   Coenzyme Q-10 100 MG CAPS Take 1 capsule by mouth Daily Yes Historical Provider, MD   calcium citrate-vitamin D (CITRICAL + D) 315-250 MG-UNIT TABS Take 2 tablets by mouth. Yes Historical Provider, MD   aspirin 81 MG tablet Take 81 mg by mouth daily. Yes Historical Provider, MD       Past Medical History:   Diagnosis Date    CAD (coronary artery disease)     Hypercholesterolemia     Hypertension     Pericarditis     history of    Vertigo        Social History     Tobacco Use    Smoking status: Former Smoker     Packs/day: 1.00     Years: 4.00     Pack years: 4.00     Types: Cigarettes     Quit date: 1965     Years since quittin.9    Smokeless tobacco: Never Used    Tobacco comment: stopped using cig many years ago late teens early 19's   Substance Use Topics    Alcohol use: Yes     Comment: seldom       Family History   Problem Relation Age of Onset    Dementia Mother     Pancreatic Cancer Father        OBJECTIVE:  /88   Pulse 95   Ht 5' 4\" (1.626 m)   Wt 182 lb (82.6 kg)   SpO2 97%   BMI 31.24 kg/m²   GEN:  in NAD  HEENT:  NCAT, TMs:normal and throat: clear  NECK:  Supple without adenopathy. CV:  Regular rate and rhythm, S1 and S2 normal, no murmurs, clicks  PULM:  Chest is clear, no wheezing ,  symmetric air entry throughout both lung fields. ABD: Soft, NT  EXT: No rash or edema  NEURO: Alert oriented ×3, nonfocal     ASSESSMENT/PLAN:  1. Essential hypertension  Changed to Benicar HCT 1 tablet daily  Take amlodipine in the evening  Recheck blood pressure 3-4    2. Acquired hypothyroidism  Able, continue supplement recheck   levothyroxine 75 mcg    3.  Mixed hyperlipidemia  Recheck labs and continue Lipitor 10 mg at night    4. Hx: recurrent pneumonia  Chest CT    5. Need for COVID-19 vaccine  Recommend #2 COVID booster when feeling well    6. SOB (shortness of breath) on exertion  Green in the future  - Full PFT Study With Bronchodilator; Future  - CT CHEST WO CONTRAST; Future    7. Expiratory wheezing  Air duo 1 to 2 puffs twice a day rinse and spit with water  Follow-up 3 to 4 weeks for lung check  - CT CHEST WO CONTRAST;  Future      30 Total Minutes spent pre charting (reviewing problem list, meds, any test results, consultant and hospital notes ) and  obtaining present visit history, performing appropriate medical exam/evaluation, counseling and educating the patient (and family), ordering medications ,tests, and procedures as needed, refilling medication(s), placing referral(s) when needed in addition to coordinating care for this patient and documenting in electronic health record 2

## 2024-08-08 ENCOUNTER — PROCEDURE VISIT (OUTPATIENT)
Dept: AUDIOLOGY | Age: 84
End: 2024-08-08
Payer: MEDICARE

## 2024-08-08 DIAGNOSIS — H90.3 SENSORINEURAL HEARING LOSS, BILATERAL: Primary | ICD-10-CM

## 2024-08-08 PROCEDURE — 92557 COMPREHENSIVE HEARING TEST: CPT

## 2024-08-08 PROCEDURE — 92567 TYMPANOMETRY: CPT

## 2024-08-08 NOTE — PROGRESS NOTES
Rosi Oliver   1940, 83 y.o. female   [id number]       Referring Provider: Sarita Neal MD   Referral Type: In an order in Epic    Reason for Visit: Evaluation of suspected change in hearing, tinnitus, or balance.    ADULT AUDIOLOGIC EVALUATION      Rosi Oliver is a 83 y.o. female seen today, 8/8/2024 , for an initial audiologic evaluation.      AUDIOLOGIC AND OTHER PERTINENT MEDICAL HISTORY:      Rosi Oliver reports last hearing test was completed last year. She did not pursue hearing aids at that time due to financial reasons. She is now noticing she has to turn the TV volume up more than before and she is interested in starting the hearing aid process. She also notes brief episodes of lightheadedness that can vary in length. She previously went to a vestibular PT in [place], but has not been formally evaluated by ENT.     She denied otalgia, aural fullness, otorrhea, tinnitus, history of falls, history of noise exposure, history of head trauma, history of ear surgery, and family history of hearing loss    Date: 8/8/2024     IMPRESSIONS:      Today's results revealed symmetric sensorineural hearing loss with Excellent speech understanding when in quiet, bilaterally. Tympanometry indicates normal middle ear function. Discussed test results and implications with patient. Discussed scheduling a HAE. Hearing aids recommended at this time. Recommend follow up with ENT regarding dizziness and vestibular PT through [facility]. Patient to also call and schedule hearing aid evaluation once she looks at her schedule.  Follow medical recommendations of Sarita Neal MD .     ASSESSMENT AND FINDINGS:     Otoscopy unremarkable.    RIGHT EAR:  Hearing Sensitivity: Mild sloping to moderate sensorineural hearing loss   Speech Recognition Threshold: 35 dB HL  Word Recognition: Excellent 100%, based on NU-6 25-word list at 75 dBHL using recorded speech stimuli.    Tympanometry: Normal peak pressure and compliance,

## 2024-08-23 RX ORDER — METFORMIN HYDROCHLORIDE 500 MG/1
TABLET, EXTENDED RELEASE ORAL
Qty: 180 TABLET | Refills: 3 | Status: SHIPPED | OUTPATIENT
Start: 2024-08-23

## 2024-08-23 RX ORDER — ATORVASTATIN CALCIUM 10 MG/1
10 TABLET, FILM COATED ORAL DAILY
Qty: 90 TABLET | Refills: 3 | Status: SHIPPED | OUTPATIENT
Start: 2024-08-23

## 2024-08-23 NOTE — TELEPHONE ENCOUNTER
Medication:   Requested Prescriptions     Pending Prescriptions Disp Refills    atorvastatin (LIPITOR) 10 MG tablet [Pharmacy Med Name: ATORVASTATIN TABS 10MG] 90 tablet 3     Sig: TAKE 1 TABLET DAILY    metFORMIN (GLUCOPHAGE-XR) 500 MG extended release tablet [Pharmacy Med Name: METFORMIN HCL ER TABS 500MG] 180 tablet 3     Sig: TAKE 2 TABLETS DAILY WITH SUPPER       Last Filled:      Patient Phone Number: [phone] (home)     Last appt: 7/23/2024   Next appt: Visit date not found    Last Labs DM:   Lab Results   Component Value Date/Time    LABA1C 6.2 10/09/2023 08:54 AM     Last Lipid:   Lab Results   Component Value Date/Time    CHOL 137 02/11/2022 08:34 AM    TRIG 128 02/11/2022 08:34 AM    HDL 69 10/09/2023 08:54 AM     Last PSA: No results found for: \"PSA\"  Last Thyroid:   Lab Results   Component Value Date/Time    TSH 3.48 10/09/2023 08:54 AM    T4FREE 1.5 09/26/2022 09:40 AM

## 2024-09-17 RX ORDER — FLUTICASONE PROPIONATE AND SALMETEROL XINAFOATE 45; 21 UG/1; UG/1
2 AEROSOL, METERED RESPIRATORY (INHALATION) 2 TIMES DAILY
Qty: 12 G | Refills: 3 | Status: SHIPPED | OUTPATIENT
Start: 2024-09-17

## 2024-09-26 ENCOUNTER — PROCEDURE VISIT (OUTPATIENT)
Dept: AUDIOLOGY | Age: 84
End: 2024-09-26

## 2024-09-26 ENCOUNTER — OFFICE VISIT (OUTPATIENT)
Dept: ENT CLINIC | Age: 84
End: 2024-09-26
Payer: MEDICARE

## 2024-09-26 VITALS
WEIGHT: 182 LBS | HEART RATE: 78 BPM | HEIGHT: 65 IN | DIASTOLIC BLOOD PRESSURE: 84 MMHG | OXYGEN SATURATION: 97 % | TEMPERATURE: 97.5 F | BODY MASS INDEX: 30.32 KG/M2 | SYSTOLIC BLOOD PRESSURE: 126 MMHG

## 2024-09-26 DIAGNOSIS — H90.3 SENSORINEURAL HEARING LOSS (SNHL) OF BOTH EARS: ICD-10-CM

## 2024-09-26 DIAGNOSIS — K21.9 GASTROESOPHAGEAL REFLUX DISEASE, UNSPECIFIED WHETHER ESOPHAGITIS PRESENT: ICD-10-CM

## 2024-09-26 DIAGNOSIS — H90.3 SENSORINEURAL HEARING LOSS, BILATERAL: Primary | ICD-10-CM

## 2024-09-26 DIAGNOSIS — H81.10 BENIGN PAROXYSMAL POSITIONAL VERTIGO, UNSPECIFIED LATERALITY: Primary | ICD-10-CM

## 2024-09-26 PROCEDURE — 1123F ACP DISCUSS/DSCN MKR DOCD: CPT | Performed by: STUDENT IN AN ORGANIZED HEALTH CARE EDUCATION/TRAINING PROGRAM

## 2024-09-26 PROCEDURE — 99204 OFFICE O/P NEW MOD 45 MIN: CPT | Performed by: STUDENT IN AN ORGANIZED HEALTH CARE EDUCATION/TRAINING PROGRAM

## 2024-09-26 PROCEDURE — 3079F DIAST BP 80-89 MM HG: CPT | Performed by: STUDENT IN AN ORGANIZED HEALTH CARE EDUCATION/TRAINING PROGRAM

## 2024-09-26 PROCEDURE — 1090F PRES/ABSN URINE INCON ASSESS: CPT | Performed by: STUDENT IN AN ORGANIZED HEALTH CARE EDUCATION/TRAINING PROGRAM

## 2024-09-26 PROCEDURE — G8399 PT W/DXA RESULTS DOCUMENT: HCPCS | Performed by: STUDENT IN AN ORGANIZED HEALTH CARE EDUCATION/TRAINING PROGRAM

## 2024-09-26 PROCEDURE — 3074F SYST BP LT 130 MM HG: CPT | Performed by: STUDENT IN AN ORGANIZED HEALTH CARE EDUCATION/TRAINING PROGRAM

## 2024-09-26 PROCEDURE — G8417 CALC BMI ABV UP PARAM F/U: HCPCS | Performed by: STUDENT IN AN ORGANIZED HEALTH CARE EDUCATION/TRAINING PROGRAM

## 2024-09-26 PROCEDURE — 1036F TOBACCO NON-USER: CPT | Performed by: STUDENT IN AN ORGANIZED HEALTH CARE EDUCATION/TRAINING PROGRAM

## 2024-09-26 PROCEDURE — G8427 DOCREV CUR MEDS BY ELIG CLIN: HCPCS | Performed by: STUDENT IN AN ORGANIZED HEALTH CARE EDUCATION/TRAINING PROGRAM

## 2024-09-26 RX ORDER — PANTOPRAZOLE SODIUM 40 MG/1
40 TABLET, DELAYED RELEASE ORAL DAILY
Qty: 90 TABLET | Refills: 0 | Status: SHIPPED | OUTPATIENT
Start: 2024-09-26

## 2024-10-01 RX ORDER — PANTOPRAZOLE SODIUM 40 MG/1
40 TABLET, DELAYED RELEASE ORAL DAILY
Qty: 90 TABLET | Refills: 0 | OUTPATIENT
Start: 2024-10-01

## 2024-10-07 ENCOUNTER — PATIENT MESSAGE (OUTPATIENT)
Dept: FAMILY MEDICINE CLINIC | Age: 84
End: 2024-10-07

## 2024-10-07 DIAGNOSIS — E78.00 PURE HYPERCHOLESTEROLEMIA: ICD-10-CM

## 2024-10-07 DIAGNOSIS — E03.9 ACQUIRED HYPOTHYROIDISM: ICD-10-CM

## 2024-10-07 DIAGNOSIS — I10 ESSENTIAL HYPERTENSION: Primary | ICD-10-CM

## 2024-10-07 DIAGNOSIS — R73.03 PREDIABETES: ICD-10-CM

## 2024-10-09 ENCOUNTER — HOSPITAL ENCOUNTER (OUTPATIENT)
Dept: PHYSICAL THERAPY | Age: 84
Setting detail: THERAPIES SERIES
Discharge: HOME | End: 2024-10-09
Payer: MEDICARE

## 2024-10-09 DIAGNOSIS — H81.8X9 DEFICIT OF VESTIBULO-OCULAR REFLEX: ICD-10-CM

## 2024-10-09 DIAGNOSIS — R26.89 IMBALANCE: Primary | ICD-10-CM

## 2024-10-09 DIAGNOSIS — H81.10 BENIGN PAROXYSMAL POSITIONAL VERTIGO, UNSPECIFIED LATERALITY: ICD-10-CM

## 2024-10-09 PROCEDURE — 97162 PT EVAL MOD COMPLEX 30 MIN: CPT

## 2024-10-09 NOTE — PLAN OF CARE
[facility] - [facility] [address], [place] [address] office: [phone] fax: [phone]     Physical Therapy Initial Evaluation Certification      Dear Junior See DO,    We had the pleasure of evaluating the following patient for physical therapy services at [facility].  A summary of our findings can be found in the initial assessment below.  This includes our plan of care.  If you have any questions or concerns regarding these findings, please do not hesitate to contact me at the office phone number listed above.  Thank you for the referral.     Physician Signature:_______________________________Date:__________________  By signing above (or electronic signature), therapist’s plan is approved by physician       Physical Therapy: TREATMENT/PROGRESS NOTE   Patient: Rosi Oliver (84 y.o. female)   Examination Date: 10/09/2024   :  1940 MRN: [id number]   Visit #: 1   Insurance Allowable Auth Needed   BMN []Yes    [x]No    Insurance: Payor: MEDICARE / Plan: MEDICARE PART A AND B / Product Type: *No Product type* /   Insurance ID: [id number] - (Medicare)  Secondary Insurance (if applicable): [facility]   Treatment Diagnosis:     ICD-10-CM    1. Imbalance  R26.89       2. Deficit of vestibulo-ocular reflex  H81.8X9       3. Benign paroxysmal positional vertigo, unspecified laterality  H81.10          Medical Diagnosis:  Benign paroxysmal positional vertigo, unspecified laterality [H81.10]   Referring Physician: Junior See DO  PCP: Sarita Neal MD       Plan of care signed (Y/N):     Date of Patient follow up with Physician:      Plan of Care Report: EVAL today  POC update due: (10 visits /OR AUTH LIMITS, whichever is less)  2024                                             Medical History:  Comorbidities:  Hypertension  Other: hypothyroidism, CAD  Relevant Medical History: vertigo,

## 2024-10-11 ENCOUNTER — HOSPITAL ENCOUNTER (OUTPATIENT)
Dept: PHYSICAL THERAPY | Age: 84
Setting detail: THERAPIES SERIES
Discharge: HOME | End: 2024-10-11
Payer: MEDICARE

## 2024-10-11 PROCEDURE — 95992 CANALITH REPOSITIONING PROC: CPT

## 2024-10-11 PROCEDURE — 97112 NEUROMUSCULAR REEDUCATION: CPT

## 2024-10-11 NOTE — FLOWSHEET NOTE
[facility] - Outpatient Rehabilitation [facility] Therapy [address], [place] [address] office: [phone] fax: [phone]         Physical Therapy: TREATMENT/PROGRESS NOTE   Patient: Rosi Oliver (84 y.o. female)   Examination Date: 10/11/2024   :  1940 MRN: [id number]   Visit #: 2   Insurance Allowable Auth Needed   BMN []Yes    [x]No    Insurance: Payor: MEDICARE / Plan: MEDICARE PART A AND B / Product Type: *No Product type* /   Insurance ID: [id number] - (Medicare)  Secondary Insurance (if applicable): [facility]   Treatment Diagnosis:     ICD-10-CM    1. Imbalance  R26.89       2. Deficit of vestibulo-ocular reflex  H81.8X9       3. Benign paroxysmal positional vertigo, unspecified laterality  H81.10          Medical Diagnosis:  Benign paroxysmal positional vertigo, unspecified laterality [H81.10]   Referring Physician: Junior See DO  PCP: Sarita Neal MD       Plan of care signed (Y/N):     Date of Patient follow up with Physician:      Plan of Care Report: EVAL today  POC update due: (10 visits /OR AUTH LIMITS, whichever is less)  2024                                             Medical History:  Comorbidities:  Hypertension  Other: hypothyroidism, CAD  Relevant Medical History: vertigo, pericarditis (history of)                                         Precautions/ Contra-indications:           Latex allergy:  NO  Pacemaker:    NO  Contraindications for Manipulation: NA  Date of Surgery: NA  Other:    Red Flags:  None    Suicide Screening:   The patient did not verbalize a primary behavioral concern, suicidal ideation, suicidal intent, or demonstrate suicidal behaviors.    Preferred Language for Healthcare:   [x] English       [] other:    SUBJECTIVE EXAMINATION     Patient stated complaint/comment: 10/11: Patient describes her symptoms as lightheadedness / dizziness. Says its noticeable while lying down.     Eval :Acute on chronic dizziness.

## 2024-10-15 ENCOUNTER — HOSPITAL ENCOUNTER (OUTPATIENT)
Dept: PHYSICAL THERAPY | Age: 84
Setting detail: THERAPIES SERIES
Discharge: HOME | End: 2024-10-15
Payer: MEDICARE

## 2024-10-15 PROCEDURE — 97112 NEUROMUSCULAR REEDUCATION: CPT

## 2024-10-15 NOTE — FLOWSHEET NOTE
[facility] - Outpatient Rehabilitation and Therapy [address], [place] [address] office: [phone] fax: [phone]         Physical Therapy: TREATMENT/PROGRESS NOTE   Patient: Rosi Oliver (84 y.o. female)   Examination Date: 10/15/2024   :  1940 MRN: [id number]   Visit #: 3   Insurance Allowable Auth Needed   BMN []Yes    [x]No    Insurance: Payor: MEDICARE / Plan: MEDICARE PART A AND B / Product Type: *No Product type* /   Insurance ID: [id number] - (Medicare)  Secondary Insurance (if applicable): [facility]   Treatment Diagnosis:     ICD-10-CM    1. Imbalance  R26.89       2. Deficit of vestibulo-ocular reflex  H81.8X9       3. Benign paroxysmal positional vertigo, unspecified laterality  H81.10          Medical Diagnosis:  Benign paroxysmal positional vertigo, unspecified laterality [H81.10]   Referring Physician: Junior See DO  PCP: Sarita Neal MD       Plan of care signed (Y/N): Y    Date of Patient follow up with Physician: ?     Plan of Care Report: NO  POC update due: (10 visits /OR AUTH LIMITS, whichever is less)  2024                                             Medical History:  Comorbidities:  Hypertension  Other: hypothyroidism, CAD  Relevant Medical History: vertigo, pericarditis (history of)                                         Precautions/ Contra-indications:           Latex allergy:  NO  Pacemaker:    NO  Contraindications for Manipulation: NA  Date of Surgery: NA  Other:    Red Flags:  None    Suicide Screening:   The patient did not verbalize a primary behavioral concern, suicidal ideation, suicidal intent, or demonstrate suicidal behaviors.    Preferred Language for Healthcare:   [x] English       [] other:    SUBJECTIVE EXAMINATION     Patient stated complaint/comment: 10/15: Patient describes her symptoms more as lightheadedness / dizziness. Says it improved after last visit.     Eval :Acute on chronic dizziness.

## 2024-10-17 ENCOUNTER — HOSPITAL ENCOUNTER (OUTPATIENT)
Dept: PHYSICAL THERAPY | Age: 84
Setting detail: THERAPIES SERIES
Discharge: HOME | End: 2024-10-17
Payer: MEDICARE

## 2024-10-17 PROCEDURE — 97112 NEUROMUSCULAR REEDUCATION: CPT

## 2024-10-17 NOTE — FLOWSHEET NOTE
Patient is progressing as expected towards functional goals listed.    [] Progression is slowed due to complexities/Impairments listed.  [] Progression has been slowed due to co-morbidities.  [x] Plan just implemented, too soon (<30days) to assess goals progression   [] Goals require adjustment due to lack of progress  [] Patient is not progressing as expected and requires additional follow up with physician  [] Other:     TREATMENT PLAN     Frequency/Duration: 2x/week for 6 weeks for the following treatment interventions:    Interventions:  Therapeutic Exercise (24021) including: strength training, ROM, and functional mobility  Therapeutic Activities (44449) including: functional mobility training and education.  Neuromuscular Re-education (01379) activation and proprioception, including postural re-education.    Gait Training (56652) for normalization of ambulation patterns and AD training.   Patient education on postural re-education, activity modification, progression of HEP, and vestibular fuction/BPPV  CRP for assessment, treatment and education of BPPV    Plan:  VRT , CRP, gaze stabilization, static/dynamic balance, biodex     Electronically Signed by Izabella Mccracken, PT, DPT, JOSE-VRC  Date: 10/17/2024     Note: Portions of this note have been templated and/or copied from initial evaluation, reassessments and prior notes for documentation efficiency.    Note: If patient does not return for scheduled/recommended follow up visits, this note will serve as a discharge from care along with the most recent update on progress.    Vestibular Evaluation

## 2024-10-18 DIAGNOSIS — I10 ESSENTIAL HYPERTENSION: ICD-10-CM

## 2024-10-18 DIAGNOSIS — R73.03 PREDIABETES: ICD-10-CM

## 2024-10-18 DIAGNOSIS — E03.9 ACQUIRED HYPOTHYROIDISM: ICD-10-CM

## 2024-10-18 DIAGNOSIS — E78.00 PURE HYPERCHOLESTEROLEMIA: ICD-10-CM

## 2024-10-18 LAB
25(OH)D3 SERPL-MCNC: 45.9 NG/ML
ALBUMIN SERPL-MCNC: 4.2 G/DL (ref 3.4–5)
ALBUMIN/GLOB SERPL: 1.8 {RATIO} (ref 1.1–2.2)
ALP SERPL-CCNC: 91 U/L (ref 40–129)
ALT SERPL-CCNC: 18 U/L (ref 10–40)
ANION GAP SERPL CALCULATED.3IONS-SCNC: 12 MMOL/L (ref 3–16)
AST SERPL-CCNC: 19 U/L (ref 15–37)
BASOPHILS # BLD: 0.1 K/UL (ref 0–0.2)
BASOPHILS NFR BLD: 1 %
BILIRUB SERPL-MCNC: 0.4 MG/DL (ref 0–1)
BUN SERPL-MCNC: 28 MG/DL (ref 7–20)
CALCIUM SERPL-MCNC: 9.9 MG/DL (ref 8.3–10.6)
CHLORIDE SERPL-SCNC: 103 MMOL/L (ref 99–110)
CHOLEST SERPL-MCNC: 179 MG/DL (ref 0–199)
CO2 SERPL-SCNC: 28 MMOL/L (ref 21–32)
CREAT SERPL-MCNC: 1 MG/DL (ref 0.6–1.2)
DEPRECATED RDW RBC AUTO: 15.7 % (ref 12.4–15.4)
EOSINOPHIL # BLD: 0 K/UL (ref 0–0.6)
EOSINOPHIL NFR BLD: 0 %
EST. AVERAGE GLUCOSE BLD GHB EST-MCNC: 128.4 MG/DL
FOLATE SERPL-MCNC: 10.1 NG/ML (ref 4.78–24.2)
GFR SERPLBLD CREATININE-BSD FMLA CKD-EPI: 56 ML/MIN/{1.73_M2}
GLUCOSE SERPL-MCNC: 96 MG/DL (ref 70–99)
HBA1C MFR BLD: 6.1 %
HCT VFR BLD AUTO: 39.4 % (ref 36–48)
HDLC SERPL-MCNC: 66 MG/DL (ref 40–60)
HGB BLD-MCNC: 13.1 G/DL (ref 12–16)
LDLC SERPL CALC-MCNC: 97 MG/DL
LYMPHOCYTES # BLD: 1.5 K/UL (ref 1–5.1)
LYMPHOCYTES NFR BLD: 24 %
MCH RBC QN AUTO: 30.8 PG (ref 26–34)
MCHC RBC AUTO-ENTMCNC: 33.3 G/DL (ref 31–36)
MCV RBC AUTO: 92.6 FL (ref 80–100)
MONOCYTES # BLD: 0.4 K/UL (ref 0–1.3)
MONOCYTES NFR BLD: 7 %
NEUTROPHILS # BLD: 4.4 K/UL (ref 1.7–7.7)
NEUTROPHILS NFR BLD: 68 %
PLATELET # BLD AUTO: 331 K/UL (ref 135–450)
PLATELET BLD QL SMEAR: ADEQUATE
PMV BLD AUTO: 7.1 FL (ref 5–10.5)
POTASSIUM SERPL-SCNC: 4.9 MMOL/L (ref 3.5–5.1)
PROT SERPL-MCNC: 6.5 G/DL (ref 6.4–8.2)
RBC # BLD AUTO: 4.26 M/UL (ref 4–5.2)
RBC MORPH BLD: NORMAL
SLIDE REVIEW: ABNORMAL
SODIUM SERPL-SCNC: 143 MMOL/L (ref 136–145)
T4 FREE SERPL-MCNC: 1.2 NG/DL (ref 0.9–1.8)
TRIGL SERPL-MCNC: 81 MG/DL (ref 0–150)
TSH SERPL DL<=0.005 MIU/L-ACNC: 5.71 UIU/ML (ref 0.27–4.2)
VIT B12 SERPL-MCNC: 302 PG/ML (ref 211–911)
VLDLC SERPL CALC-MCNC: 16 MG/DL
WBC # BLD AUTO: 6.4 K/UL (ref 4–11)

## 2024-10-21 RX ORDER — IRBESARTAN AND HYDROCHLOROTHIAZIDE 150; 12.5 MG/1; MG/1
1 TABLET, FILM COATED ORAL DAILY
Qty: 90 TABLET | Refills: 2 | Status: SHIPPED | OUTPATIENT
Start: 2024-10-21

## 2024-10-21 NOTE — TELEPHONE ENCOUNTER
Lov 7/23/24  Lrf 90 1 3/19/24 Medication:   Requested Prescriptions     Pending Prescriptions Disp Refills    irbesartan-hydroCHLOROthiazide (AVALIDE) 150-12.5 MG per tablet [Pharmacy Med Name: IRBESARTAN/HCTZ TABS 150/12.5MG] 90 tablet 3     Sig: TAKE 1 TABLET DAILY       Last Filled:      Patient Phone Number: [phone] (home)     Last appt: 7/23/2024   Next appt: Visit date not found    Lab Results   Component Value Date     10/18/2024    K 4.9 10/18/2024     10/18/2024    CO2 28 10/18/2024    BUN 28 (H) 10/18/2024    CREATININE 1.0 10/18/2024    GLUCOSE 96 10/18/2024    CALCIUM 9.9 10/18/2024    BILITOT 0.4 10/18/2024    ALKPHOS 91 10/18/2024    AST 19 10/18/2024    ALT 18 10/18/2024    LABGLOM 56 (A) 10/18/2024    GFRAA >60 09/26/2022    AGRATIO 1.8 10/18/2024    GLOB 2.9 02/11/2022

## 2024-10-22 ENCOUNTER — APPOINTMENT (OUTPATIENT)
Dept: PHYSICAL THERAPY | Age: 84
End: 2024-10-22
Payer: MEDICARE

## 2024-10-23 ENCOUNTER — HOSPITAL ENCOUNTER (OUTPATIENT)
Dept: PHYSICAL THERAPY | Age: 84
Setting detail: THERAPIES SERIES
Discharge: HOME | End: 2024-10-23
Payer: MEDICARE

## 2024-10-23 PROCEDURE — 97112 NEUROMUSCULAR REEDUCATION: CPT

## 2024-10-23 NOTE — FLOWSHEET NOTE
[facility] - Outpatient Rehabilitation and Therapy [address], [place] [address] office: [phone] fax: [phone]         Physical Therapy: TREATMENT/PROGRESS NOTE   Patient: Rosi Oliver (84 y.o. female)   Examination Date: 10/23/2024   :  1940 MRN: [id number]   Visit #: 5   Insurance Allowable Auth Needed   BMN []Yes    [x]No    Insurance: Payor: MEDICARE / Plan: MEDICARE PART A AND B / Product Type: *No Product type* /   Insurance ID: [id number] - (Medicare)  Secondary Insurance (if applicable): [facility]   Treatment Diagnosis:     ICD-10-CM    1. Imbalance  R26.89       2. Deficit of vestibulo-ocular reflex  H81.8X9       3. Benign paroxysmal positional vertigo, unspecified laterality  H81.10          Medical Diagnosis:  Benign paroxysmal positional vertigo, unspecified laterality [H81.10]   Referring Physician: Junior See DO  PCP: Sarita Neal MD       Plan of care signed (Y/N): Y    Date of Patient follow up with Physician: ?     Plan of Care Report: NO  POC update due: (10 visits /OR AUTH LIMITS, whichever is less)  2024                                             Medical History:  Comorbidities:  Hypertension  Other: hypothyroidism, CAD  Relevant Medical History: vertigo, pericarditis (history of)                                         Precautions/ Contra-indications:           Latex allergy:  NO  Pacemaker:    NO  Contraindications for Manipulation: NA  Date of Surgery: NA  Other:    Red Flags:  None    Suicide Screening:   The patient did not verbalize a primary behavioral concern, suicidal ideation, suicidal intent, or demonstrate suicidal behaviors.    Preferred Language for Healthcare:   [x] English       [] other:    SUBJECTIVE EXAMINATION     Patient stated complaint/comment: States that she has occasional light headedness especially with her Golf swing . Says she feels like she falls backwards some and that symptoms is coming

## 2024-10-25 ENCOUNTER — HOSPITAL ENCOUNTER (OUTPATIENT)
Dept: PHYSICAL THERAPY | Age: 84
Setting detail: THERAPIES SERIES
Discharge: HOME | End: 2024-10-25
Payer: MEDICARE

## 2024-10-25 PROCEDURE — 97112 NEUROMUSCULAR REEDUCATION: CPT

## 2024-10-25 PROCEDURE — 97124 MASSAGE THERAPY: CPT
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
## 2024-10-28 RX ORDER — LOTILANER OPHTHALMIC SOLUTION 2.5 MG/ML
SOLUTION/ DROPS OPHTHALMIC
COMMUNITY
Start: 2024-10-18

## 2024-10-28 RX ORDER — CYCLOSPORINE OPHTHALMIC SOLUTION 1 MG/ML
1 SOLUTION/ DROPS OPHTHALMIC 2 TIMES DAILY
COMMUNITY
Start: 2024-10-14

## 2024-10-30 ENCOUNTER — HOSPITAL ENCOUNTER (OUTPATIENT)
Dept: PHYSICAL THERAPY | Age: 84
Setting detail: THERAPIES SERIES
Discharge: HOME | End: 2024-10-30
Payer: MEDICARE

## 2024-10-30 ENCOUNTER — TELEPHONE (OUTPATIENT)
Dept: ENT CLINIC | Age: 84
End: 2024-10-30

## 2024-10-30 PROCEDURE — 97112 NEUROMUSCULAR REEDUCATION: CPT

## 2024-10-30 PROCEDURE — 97140 MANUAL THERAPY 1/> REGIONS: CPT

## 2024-10-30 NOTE — FLOWSHEET NOTE
[facility] - Outpatient Rehabilitation and Therapy [address], [place] [address] office: [phone] fax: [phone]         Physical Therapy: TREATMENT/PROGRESS NOTE   Patient: Rosi Oliver (84 y.o. female)   Examination Date: 10/30/2024   :  1940 MRN: [id number]   Visit #: 7   Insurance Allowable Auth Needed   BMN []Yes    [x]No    Insurance: Payor: MEDICARE / Plan: MEDICARE PART A AND B / Product Type: *No Product type* /   Insurance ID: [id number] - (Medicare)  Secondary Insurance (if applicable): [facility]   Treatment Diagnosis:     ICD-10-CM    1. Imbalance  R26.89       2. Deficit of vestibulo-ocular reflex  H81.8X9       3. Benign paroxysmal positional vertigo, unspecified laterality  H81.10          Medical Diagnosis:  Benign paroxysmal positional vertigo, unspecified laterality [H81.10]   Referring Physician: Junior See DO  PCP: Sarita Neal MD       Plan of care signed (Y/N): Y    Date of Patient follow up with Physician: ?     Plan of Care Report: NO  POC update due: (10 visits /OR AUTH LIMITS, whichever is less)  2024                                             Medical History:  Comorbidities:  Hypertension  Other: hypothyroidism, CAD  Relevant Medical History: vertigo, pericarditis (history of)                                         Precautions/ Contra-indications:           Latex allergy:  NO  Pacemaker:    NO  Contraindications for Manipulation: NA  Date of Surgery: NA  Other:    Red Flags:  None    Suicide Screening:   The patient did not verbalize a primary behavioral concern, suicidal ideation, suicidal intent, or demonstrate suicidal behaviors.    Preferred Language for Healthcare:   [x] English       [] other:    SUBJECTIVE EXAMINATION     Patient stated complaint/comment: Feels the manual helped slightly last session.     Eval :Acute on chronic dizziness.  Intermittent dizziness that has been occurring for 1 year.  Has a

## 2024-11-01 ENCOUNTER — APPOINTMENT (OUTPATIENT)
Dept: PHYSICAL THERAPY | Age: 84
End: 2024-11-01
Payer: MEDICARE

## 2024-11-05 ENCOUNTER — APPOINTMENT (OUTPATIENT)
Dept: PHYSICAL THERAPY | Age: 84
End: 2024-11-05
Payer: MEDICARE

## 2024-11-07 ENCOUNTER — HOSPITAL ENCOUNTER (OUTPATIENT)
Dept: PHYSICAL THERAPY | Age: 84
Setting detail: THERAPIES SERIES
Discharge: HOME | End: 2024-11-07
Payer: MEDICARE

## 2024-11-07 PROCEDURE — 97112 NEUROMUSCULAR REEDUCATION: CPT

## 2024-11-07 PROCEDURE — 97140 MANUAL THERAPY 1/> REGIONS: CPT

## 2024-11-07 NOTE — FLOWSHEET NOTE
[facility] - Outpatient Rehabilitation and Therapy [address], [place] [address] office: [phone] fax: [phone]         Physical Therapy: TREATMENT/PROGRESS NOTE   Patient: Rosi Oliver (84 y.o. female)   Examination Date: 2024   :  1940 MRN: [id number]   Visit #: 8   Insurance Allowable Auth Needed   BMN []Yes    [x]No    Insurance: Payor: MEDICARE / Plan: MEDICARE PART A AND B / Product Type: *No Product type* /   Insurance ID: [id number] - (Medicare)  Secondary Insurance (if applicable): [facility]   Treatment Diagnosis:     ICD-10-CM    1. Imbalance  R26.89       2. Deficit of vestibulo-ocular reflex  H81.8X9       3. Benign paroxysmal positional vertigo, unspecified laterality  H81.10          Medical Diagnosis:  Benign paroxysmal positional vertigo, unspecified laterality [H81.10]   Referring Physician: Junior See DO  PCP: Sarita Neal MD       Plan of care signed (Y/N): Y    Date of Patient follow up with Physician: ?     Plan of Care Report: NO  POC update due: (10 visits /OR AUTH LIMITS, whichever is less)  2024                                             Medical History:  Comorbidities:  Hypertension  Other: hypothyroidism, CAD  Relevant Medical History: vertigo, pericarditis (history of)                                         Precautions/ Contra-indications:           Latex allergy:  NO  Pacemaker:    NO  Contraindications for Manipulation: NA  Date of Surgery: NA  Other:    Red Flags:  None    Suicide Screening:   The patient did not verbalize a primary behavioral concern, suicidal ideation, suicidal intent, or demonstrate suicidal behaviors.    Preferred Language for Healthcare:   [x] English       [] other:    SUBJECTIVE EXAMINATION     Patient stated complaint/comment: Nothing new to report, notices that when she tilts her head back to put eye drops in she notices some dizziness.     Eval :Acute on chronic dizziness.

## 2024-11-12 ENCOUNTER — HOSPITAL ENCOUNTER (OUTPATIENT)
Dept: PHYSICAL THERAPY | Age: 84
Setting detail: THERAPIES SERIES
Discharge: HOME | End: 2024-11-12
Payer: MEDICARE

## 2024-11-12 PROCEDURE — 97112 NEUROMUSCULAR REEDUCATION: CPT

## 2024-11-12 NOTE — FLOWSHEET NOTE
[facility] - Outpatient Rehabilitation and Therapy [address], [place] [address] office: [phone] fax: [phone]         Physical Therapy: TREATMENT/PROGRESS NOTE   Patient: Rosi Oliver (84 y.o. female)   Examination Date: 2024   :  1940 MRN: [id number]   Visit #: 9   Insurance Allowable Auth Needed   BMN []Yes    [x]No    Insurance: Payor: MEDICARE / Plan: MEDICARE PART A AND B / Product Type: *No Product type* /   Insurance ID: [id number] - (Medicare)  Secondary Insurance (if applicable): [facility]   Treatment Diagnosis:     ICD-10-CM    1. Imbalance  R26.89       2. Deficit of vestibulo-ocular reflex  H81.8X9       3. Benign paroxysmal positional vertigo, unspecified laterality  H81.10          Medical Diagnosis:  Benign paroxysmal positional vertigo, unspecified laterality [H81.10]   Referring Physician: Junior See DO  PCP: Sarita Neal MD       Plan of care signed (Y/N): Y    Date of Patient follow up with Physician: ?     Plan of Care Report: NO  POC update due: (10 visits /OR AUTH LIMITS, whichever is less)  2024                                             Medical History:  Comorbidities:  Hypertension  Other: hypothyroidism, CAD  Relevant Medical History: vertigo, pericarditis (history of)                                         Precautions/ Contra-indications:           Latex allergy:  NO  Pacemaker:    NO  Contraindications for Manipulation: NA  Date of Surgery: NA  Other:    Red Flags:  None    Suicide Screening:   The patient did not verbalize a primary behavioral concern, suicidal ideation, suicidal intent, or demonstrate suicidal behaviors.    Preferred Language for Healthcare:   [x] English       [] other:    SUBJECTIVE EXAMINATION     Patient stated complaint/comment: Nothing new to report, notices that when she tilts her head back to put eye drops in she notices some dizziness.     Eval :Acute on chronic dizziness.

## 2024-11-14 ENCOUNTER — HOSPITAL ENCOUNTER (OUTPATIENT)
Dept: PHYSICAL THERAPY | Age: 84
Setting detail: THERAPIES SERIES
Discharge: HOME | End: 2024-11-14
Payer: MEDICARE

## 2024-11-14 PROCEDURE — 97750 PHYSICAL PERFORMANCE TEST: CPT

## 2024-11-14 NOTE — FLOWSHEET NOTE
MEASUREMENT (EG, MUSCULOSKELETAL, FUNCTIONAL CAPACITY), WITH WRITTEN REPORT, EACH 15 MINUTES      GOALS     Patient stated goal:   Status:  [] Progressing: [] Met: [] Not Met: [] Adjusted    Therapist goals for Patient:   Short Term Goals: To be achieved in: 2 weeks  Independent in HEP and progression per patient tolerance, in order to progress toward full function.    Status: [] Progressing: [] Met: [] Not Met: [] Adjusted  Patient's subjective complaint of dizziness/imbalance/symptoms to decrease by 50% to tolerate functional activities.   Status: [] Progressing: [] Met: [] Not Met: [] Adjusted    Long Term Goals: To be achieved in: 6 weeks  DHI score of 15 or less to assist with return to prior level of function.    Status: [] Progressing: [] Met: [] Not Met: [] Adjusted  Patient will return to bending forward and looking up without increased symptoms or restriction to work towards return to prior level of function.        Status: [] Progressing: [] Met: [] Not Met: [] Adjusted  Pt will score a 24 on the DGI showing reduced fall risk.        Status: [] Progressing: [] Met: [] Not Met: [] Adjusted  Pt will demo negative Michael-hallpike B with the ability to turn over in bed at home without dizziness      Status: [] Progressing: [] Met: [] Not Met: [] Adjusted      Overall Progression Towards Functional goals/ Treatment Progress Update:  [] Patient is progressing as expected towards functional goals listed.    [] Progression is slowed due to complexities/Impairments listed.  [] Progression has been slowed due to co-morbidities.  [x] Plan just implemented, too soon (<30days) to assess goals progression   [] Goals require adjustment due to lack of progress  [] Patient is not progressing as expected and requires additional follow up with physician  [] Other:     TREATMENT PLAN     Frequency/Duration: 2x/week for 6 weeks for the following treatment interventions:    Interventions:  Therapeutic Exercise (67309) including:

## 2024-11-15 ENCOUNTER — HOSPITAL ENCOUNTER (OUTPATIENT)
Dept: WOMENS IMAGING | Age: 84
Discharge: HOME | End: 2024-11-15
Payer: MEDICARE

## 2024-11-15 VITALS — WEIGHT: 182 LBS | BODY MASS INDEX: 30.32 KG/M2 | HEIGHT: 65 IN

## 2024-11-15 DIAGNOSIS — Z12.31 VISIT FOR SCREENING MAMMOGRAM: ICD-10-CM

## 2024-11-15 PROCEDURE — 77063 BREAST TOMOSYNTHESIS BI: CPT

## 2024-11-18 RX ORDER — LEVOTHYROXINE SODIUM 75 UG/1
75 TABLET ORAL DAILY
Qty: 90 TABLET | Refills: 3 | Status: SHIPPED | OUTPATIENT
Start: 2024-11-18

## 2024-11-18 NOTE — TELEPHONE ENCOUNTER
Medication:   Requested Prescriptions     Pending Prescriptions Disp Refills    levothyroxine (SYNTHROID) 75 MCG tablet [Pharmacy Med Name: L-THYROXINE (SYNTHROID) TABS 75MCG] 90 tablet 3     Sig: TAKE 1 TABLET DAILY     Last Filled:  03/19/24    Last appt: 7/23/2024   Next appt: Visit date not found    Last Thyroid:   Lab Results   Component Value Date/Time    TSH 5.71 10/18/2024 07:43 AM    T4FREE 1.2 10/18/2024 07:43 AM

## 2024-11-19 ENCOUNTER — HOSPITAL ENCOUNTER (OUTPATIENT)
Dept: PHYSICAL THERAPY | Age: 84
Setting detail: THERAPIES SERIES
Discharge: HOME | End: 2024-11-19
Payer: MEDICARE

## 2024-11-19 PROCEDURE — 97110 THERAPEUTIC EXERCISES: CPT

## 2024-11-19 PROCEDURE — 95992 CANALITH REPOSITIONING PROC: CPT

## 2024-11-19 PROCEDURE — 97112 NEUROMUSCULAR REEDUCATION: CPT

## 2024-11-19 NOTE — FLOWSHEET NOTE
Total Treatment Minutes 40        Charge Justification:  (03290) THERAPEUTIC EXERCISE - Provided verbal/tactile cueing for activities related to strengthening, flexibility, endurance, ROM performed to prevent loss of range of motion, maintain or improve muscular strength or increase flexibility, following either an injury or surgery.   (30392) NEUROMUSCULAR RE-EDUCATION - Therapeutic procedure, 1 or more areas, each 15 minutes; neuromuscular reeducation of movement, balance, coordination, kinesthetic sense, posture, and/or proprioception for sitting and/or standing activities  (33098) CANALITH REPOSITIONING TECHNIQUE - Provided a non-invasive treatment that involved moving the patients head and/or body into specific positions to relieve BPPV or dizziness with the changes in head and/or body positions.      GOALS     Patient stated goal:   Status:  [] Progressing: [] Met: [] Not Met: [] Adjusted    Therapist goals for Patient:   Short Term Goals: To be achieved in: 2 weeks  Independent in HEP and progression per patient tolerance, in order to progress toward full function.    Status: [] Progressing: [] Met: [] Not Met: [] Adjusted  Patient's subjective complaint of dizziness/imbalance/symptoms to decrease by 50% to tolerate functional activities.   Status: [] Progressing: [] Met: [] Not Met: [] Adjusted    Long Term Goals: To be achieved in: 6 weeks  DHI score of 15 or less to assist with return to prior level of function.    Status: [] Progressing: [] Met: [] Not Met: [] Adjusted  Patient will return to bending forward and looking up without increased symptoms or restriction to work towards return to prior level of function.        Status: [] Progressing: [] Met: [] Not Met: [] Adjusted  Pt will score a 24 on the DGI showing reduced fall risk.        Status: [] Progressing: [] Met: [] Not Met: [] Adjusted  Pt will demo negative Mcdaniel-hallpike B with the ability to turn over in bed at home without

## 2024-11-21 NOTE — PROGRESS NOTES
Rosi Oliver is a 84 y.o. female.    HPI:  Here for hypertension check and medication adjustment    Left 167/101 P 84       Pt's home cuff  Right 164/ 120  P 86      Our pole cuff   162/ 92 , 153/ 89    Recommend increasing Avalide to 300/25  in the morning and adding on nifedipine 30 mg extended release in the evening    Monitor blood and report to Dr. Neal    Meds, vitamins and allergies reviewed with pt  Wt Readings from Last 3 Encounters:   11/22/24 83.5 kg (184 lb)   11/15/24 82.6 kg (182 lb)   09/26/24 82.6 kg (182 lb)       REVIEW OF SYSTEMS:   CONSTITUTIONAL: See history of present illness,   Weight noted   HEENT: No new vision difficulties or ringing in the ears.   RESPIRATORY: No new SOB, PND, orthopnea or cough.   CARDIOVASCULAR: no CP, palpitations or SOB with exertion  GI: No nausea, vomiting, diarrhea, constipation, abdominal pain or changes in bowel habits.   : No urinary frequency, urgency, incontinence hematuria or dysuria.   SKIN: No cyanosis or skin lesions.   MUSCULOSKELETAL: No new muscle or joint pain.   NEUROLOGICAL: No syncope or TIA-like symptoms.   PSYCHIATRIC: No anxiety, insomnia or depression     Allergies   Allergen Reactions    Ceftin [Cefuroxime Axetil]      Diarrhea    Norvasc [Amlodipine] Swelling     Lower extremity edema       Prior to Visit Medications    Medication Sig Taking? Authorizing Provider   Blood Pressure KIT Check BP twice a week Yes Sarita Neal MD   NIFEdipine (ADALAT CC) 30 MG extended release tablet Take 1 tablet by mouth nightly Yes Sarita Neal MD   levothyroxine (SYNTHROID) 75 MCG tablet TAKE 1 TABLET DAILY Yes Sarita Neal MD   XDEMVY 0.25 % SOLN As directed per ophthalmology Yes Provider, Historical, MD   VEVYE 0.1 % SOLN Place 1 drop into both eyes in the morning and at bedtime Yes ProviderMejia MD   irbesartan-hydroCHLOROthiazide (AVALIDE) 150-12.5 MG per tablet TAKE 1 TABLET DAILY  Patient taking differently: Take 2 tablets by mouth

## 2024-11-22 ENCOUNTER — OFFICE VISIT (OUTPATIENT)
Dept: FAMILY MEDICINE CLINIC | Age: 84
End: 2024-11-22

## 2024-11-22 VITALS
HEART RATE: 80 BPM | DIASTOLIC BLOOD PRESSURE: 84 MMHG | SYSTOLIC BLOOD PRESSURE: 131 MMHG | BODY MASS INDEX: 31.1 KG/M2 | OXYGEN SATURATION: 98 % | WEIGHT: 184 LBS

## 2024-11-22 DIAGNOSIS — I10 ESSENTIAL HYPERTENSION: Primary | ICD-10-CM

## 2024-11-22 RX ORDER — NIFEDIPINE 30 MG
30 TABLET, EXTENDED RELEASE ORAL NIGHTLY
Qty: 30 TABLET | Refills: 3 | Status: SHIPPED | OUTPATIENT
Start: 2024-11-22

## 2024-11-22 RX ORDER — BLOOD PRESSURE TEST KIT
KIT MISCELLANEOUS
Qty: 1 KIT | Refills: 0 | Status: SHIPPED | OUTPATIENT
Start: 2024-11-22

## 2024-11-26 ENCOUNTER — PROCEDURE VISIT (OUTPATIENT)
Dept: AUDIOLOGY | Age: 84
End: 2024-11-26

## 2024-11-26 ENCOUNTER — HOSPITAL ENCOUNTER (OUTPATIENT)
Dept: PHYSICAL THERAPY | Age: 84
Setting detail: THERAPIES SERIES
Discharge: HOME | End: 2024-11-26
Payer: MEDICARE

## 2024-11-26 DIAGNOSIS — H90.3 SENSORINEURAL HEARING LOSS, BILATERAL: Primary | ICD-10-CM

## 2024-11-26 PROCEDURE — 97112 NEUROMUSCULAR REEDUCATION: CPT

## 2024-11-29 ENCOUNTER — APPOINTMENT (OUTPATIENT)
Dept: PHYSICAL THERAPY | Age: 84
End: 2024-11-29
Payer: MEDICARE

## 2024-11-29 NOTE — PROGRESS NOTES
Rosi Oliver   1940, 84 y.o. female   [id number]     HEARING AID FITTING      RIGHT EAR: /MODEL: ReSound Nexia 5 Rechargeable  SN: [id number]  WIRE/DOME: 2 MP/medium closed    LEFT EAR: /MODEL: ReSound Nexia 5 Rechargeable  SN: [id number]  WIRE/DOME: 2 MP/medium closed    : /MODEL: ReSoundPpremium   SN: [id number]   HAF: 11/26/2024  WARRANTY: 12/13/2027    BUTTONS: Short Press (Volume)  PROGRAMS: All-around, heari in noise  PHONE CONNECTIVITY: Paired to phone and jose      Rosi Oliver was seen today, 11/26/2024, to be fit with binaural ReSound Nexia 5 Rechargeable hearing aids.  Otoscopy revealed clear ear canals bilaterally, and devices appeared to be a good fit.  Devices were connected to fitting software, and hearing aids were programmed to 100%.  Feedback manager was completed.    Device orientation was completed, including:   Hearing aid insertion/removal  Buttons/Indicators  Battery charging, life expectancy  Cleaning/maintenance of the device  Acclimatization period and importance of consistent use of devices  Realistic expectations with hearing aids  Repair/Loss & Damage warranty information  30-day right-to-return period  Included follow-up (3 Hearing Aid Check appointments)      Hearing aids were paired to patent's  phone and jose.  Reviewed use and discussed consumer support through  is available through a dedicated phone line.    Rosi Oliver noted good sound quality.  It was recommended that patient return for a follow-up appointment within the 30-day right-to-return period for further programming adjustments and education of the devices as warranted.    PATIENT EDUCATION:     Rosi Oliver was provided with the Hearing Aid Fitting Checklist as a reference of items discussed at today's appointment.  Patient may find additional product information in the provided hearing aid  device manual.

## 2024-12-06 ENCOUNTER — OFFICE VISIT (OUTPATIENT)
Dept: FAMILY MEDICINE CLINIC | Age: 84
End: 2024-12-06

## 2024-12-06 VITALS
HEART RATE: 92 BPM | BODY MASS INDEX: 31.1 KG/M2 | OXYGEN SATURATION: 97 % | DIASTOLIC BLOOD PRESSURE: 80 MMHG | WEIGHT: 184 LBS | SYSTOLIC BLOOD PRESSURE: 126 MMHG

## 2024-12-06 DIAGNOSIS — I10 ESSENTIAL HYPERTENSION: Primary | ICD-10-CM

## 2024-12-06 DIAGNOSIS — I10 ESSENTIAL HYPERTENSION: ICD-10-CM

## 2024-12-06 LAB
ALBUMIN SERPL-MCNC: 4.4 G/DL (ref 3.4–5)
ALBUMIN/GLOB SERPL: 1.7 {RATIO} (ref 1.1–2.2)
ALP SERPL-CCNC: 99 U/L (ref 40–129)
ALT SERPL-CCNC: 17 U/L (ref 10–40)
ANION GAP SERPL CALCULATED.3IONS-SCNC: 12 MMOL/L (ref 3–16)
AST SERPL-CCNC: 21 U/L (ref 15–37)
BILIRUB SERPL-MCNC: 0.6 MG/DL (ref 0–1)
BUN SERPL-MCNC: 29 MG/DL (ref 7–20)
CALCIUM SERPL-MCNC: 10.4 MG/DL (ref 8.3–10.6)
CHLORIDE SERPL-SCNC: 100 MMOL/L (ref 99–110)
CO2 SERPL-SCNC: 28 MMOL/L (ref 21–32)
CREAT SERPL-MCNC: 1 MG/DL (ref 0.6–1.2)
GFR SERPLBLD CREATININE-BSD FMLA CKD-EPI: 56 ML/MIN/{1.73_M2}
GLUCOSE SERPL-MCNC: 88 MG/DL (ref 70–99)
POTASSIUM SERPL-SCNC: 4.5 MMOL/L (ref 3.5–5.1)
PROT SERPL-MCNC: 7 G/DL (ref 6.4–8.2)
SODIUM SERPL-SCNC: 140 MMOL/L (ref 136–145)

## 2024-12-06 RX ORDER — NIFEDIPINE 30 MG
30 TABLET, EXTENDED RELEASE ORAL NIGHTLY
Qty: 90 TABLET | Refills: 3 | Status: SHIPPED | OUTPATIENT
Start: 2024-12-06

## 2024-12-06 RX ORDER — IRBESARTAN AND HYDROCHLOROTHIAZIDE 300; 12.5 MG/1; MG/1
1 TABLET, FILM COATED ORAL
Qty: 90 TABLET | Refills: 3 | Status: SHIPPED | OUTPATIENT
Start: 2024-12-06

## 2024-12-06 NOTE — PROGRESS NOTES
Rosi Oliver is a 84 y.o. female.    HPI:  Here for HTN check   Tolerating meds   Requesting refills     Labs October 2024 reviewed with patient in detail  Due for updated Tdap through pharmacy, patient aware    Meds, vitamins and allergies reviewed with pt    Wt Readings from Last 3 Encounters:   12/06/24 83.5 kg (184 lb)   11/22/24 83.5 kg (184 lb)   11/15/24 82.6 kg (182 lb)       REVIEW OF SYSTEMS:   CONSTITUTIONAL: See history of present illness,   Weight noted/stable  HEENT: No new vision difficulties or ringing in the ears.   RESPIRATORY: No new SOB, PND, orthopnea or cough.   CARDIOVASCULAR: no CP, palpitations or SOB with exertion  GI: No nausea, vomiting, diarrhea, constipation, abdominal pain or changes in bowel habits.   : No urinary frequency, urgency, incontinence hematuria or dysuria.   SKIN: No cyanosis or skin lesions.   MUSCULOSKELETAL: No new muscle or joint pain.   NEUROLOGICAL: No syncope or TIA-like symptoms.   PSYCHIATRIC: No anxiety, insomnia or depression     Allergies   Allergen Reactions    Ceftin [Cefuroxime Axetil]      Diarrhea    Norvasc [Amlodipine] Swelling     Lower extremity edema       Prior to Visit Medications    Medication Sig Taking? Authorizing Provider   irbesartan-hydroCHLOROthiazide (AVALIDE) 300-12.5 MG per tablet Take 1 tablet by mouth daily (with breakfast) Yes Sarita Neal MD   NIFEdipine (ADALAT CC) 30 MG extended release tablet Take 1 tablet by mouth nightly Yes Sarita Neal MD   Blood Pressure KIT Check BP twice a week Yes Sarita Neal MD   levothyroxine (SYNTHROID) 75 MCG tablet TAKE 1 TABLET DAILY Yes Sarita Neal MD   XDEMVY 0.25 % SOLN As directed per ophthalmology Yes Provider, Historical, MD   VEVYE 0.1 % SOLN Place 1 drop into both eyes in the morning and at bedtime Yes Mejia Dobbins MD   pantoprazole (PROTONIX) 40 MG tablet Take 1 tablet by mouth daily Yes Junior See DO   atorvastatin (LIPITOR) 10 MG tablet TAKE 1 TABLET

## 2024-12-13 ENCOUNTER — HOSPITAL ENCOUNTER (OUTPATIENT)
Dept: PHYSICAL THERAPY | Age: 84
Setting detail: THERAPIES SERIES
Discharge: HOME | End: 2024-12-13
Payer: MEDICARE

## 2024-12-13 PROCEDURE — 97112 NEUROMUSCULAR REEDUCATION: CPT

## 2024-12-13 NOTE — PLAN OF CARE
Adjusted  Patient's subjective complaint of dizziness/imbalance/symptoms to decrease by 50% to tolerate functional activities.   Status: [] Progressing: [x] Met: [] Not Met: [] Adjusted    Long Term Goals: To be achieved in: 6 weeks  DHI score of 15 or less to assist with return to prior level of function.    Status: [x] Progressing: [] Met: [] Not Met: [] Adjusted  Patient will return to bending forward and looking up without increased symptoms or restriction to work towards return to prior level of function.        Status: [] Progressing: [x] Met: [] Not Met: [] Adjusted  Pt will score a 24 on the DGI showing reduced fall risk.        Status: [x] Progressing: [] Met: [] Not Met: [] Adjusted  Pt will demo negative [place]-hallpike B with the ability to turn over in bed at home without dizziness      Status: [] Progressing: [x] Met: [] Not Met: [] Adjusted      Overall Progression Towards Functional goals/ Treatment Progress Update:  [] Patient is progressing as expected towards functional goals listed.    [] Progression is slowed due to complexities/Impairments listed.  [] Progression has been slowed due to co-morbidities.  [x] Plan just implemented, too soon (<30days) to assess goals progression   [] Goals require adjustment due to lack of progress  [] Patient is not progressing as expected and requires additional follow up with physician  [] Other:     TREATMENT PLAN     Frequency/Duration: 2x/week for 6 weeks for the following treatment interventions:    Interventions:  Therapeutic Exercise (45757) including: strength training, ROM, and functional mobility  Therapeutic Activities (03199) including: functional mobility training and education.  Neuromuscular Re-education (28113) activation and proprioception, including postural re-education.    Gait Training (88583) for normalization of ambulation patterns and AD training.   Patient education on postural re-education, activity modification, progression of HEP, and

## 2024-12-17 ENCOUNTER — PROCEDURE VISIT (OUTPATIENT)
Dept: AUDIOLOGY | Age: 84
End: 2024-12-17

## 2024-12-17 DIAGNOSIS — H90.3 SENSORINEURAL HEARING LOSS, BILATERAL: Primary | ICD-10-CM

## 2024-12-17 NOTE — PROGRESS NOTES
Rosi Oliver   1940, 84 y.o. female   [id number]     RIGHT EAR: /MODEL: ReSound Nexia 5 Rechargeable  SN: [id number]  WIRE/DOME: 2 MP/medium closed    LEFT EAR: /MODEL: ReSound Nexia 5 Rechargeable  SN: [id number]  WIRE/DOME: 2 MP/medium closed    : /MODEL: ReSoundPpremium   SN: [id number]   HAF: 11/26/2024  WARRANTY: 12/13/2027     BUTTONS: Short Press (Volume)  PROGRAMS: All-around, heari in noise  PHONE CONNECTIVITY: Paired to phone and jose      HEARING AID CHECK - FIRST FOLLOW-UP      Rosi Oliver was seen today, 12/17/2024, for a hearing aid check appointment.  Patient noted she has overall been doing well with her hearing aids and has discussed with many friends.  She noted she is very pleased with the sound quality, and notices a difference from the devices.  She has gone to noisier environments, such as [employer]; she felt it was noisy in the environment, but not that it was not any different through the hearing aids than what she would expect otherwise.    PROCEDURES:     Otoscopy revealed: Cerumen in right ear canal, clear left ear canal  Reviewed changing domes and wax traps; she was able to complete this in the office today..  Connected devices to fitting software and completed real ear measurements with Verifit 2.  Increased from AL2 with adaptation manager active to AL3.  Made fine-tuning gain adjustments for devices to be in good agreement with NAL-NL2 targets for soft, average, and loud speech.  MPO was in good range.  Strengthened noise management settings for loud noises, and increased noise management overall to strongest settings available.  Datalogging revealed 13 hours of use per day.  She had a concern that her jose is not on her phone.  Could not find icon in her list of apps.  Went to the [employer] play store and attempted to open the jose, but it did not.  Uninstalled and reinstalled the jose and reconnected today.  Was

## 2024-12-18 ENCOUNTER — APPOINTMENT (OUTPATIENT)
Dept: PHYSICAL THERAPY | Age: 84
End: 2024-12-18
Payer: MEDICARE

## 2024-12-20 ENCOUNTER — HOSPITAL ENCOUNTER (OUTPATIENT)
Dept: PHYSICAL THERAPY | Age: 84
Setting detail: THERAPIES SERIES
Discharge: HOME | End: 2024-12-20
Payer: MEDICARE

## 2024-12-20 PROCEDURE — 97112 NEUROMUSCULAR REEDUCATION: CPT

## 2024-12-20 NOTE — PLAN OF CARE
[facility] - Outpatient Rehabilitation and Therapy [address], [place] [address] office: [phone] fax: [phone]      Physical Therapy: TREATMENT/PROGRESS NOTE   Patient: Rosi Oliver (84 y.o. female)   Examination Date: 2024   :  1940 MRN: [id number]   Visit #: 14   Insurance Allowable Auth Needed   BMN []Yes    [x]No    Insurance: Payor: MEDICARE / Plan: MEDICARE PART A AND B / Product Type: *No Product type* /   Insurance ID: [id number] - (Medicare)  Secondary Insurance (if applicable): [facility]   Treatment Diagnosis:     ICD-10-CM    1. Imbalance  R26.89       2. Deficit of vestibulo-ocular reflex  H81.8X9       3. Benign paroxysmal positional vertigo, unspecified laterality  H81.10          Medical Diagnosis:  Benign paroxysmal positional vertigo, unspecified laterality [H81.10]   Referring Physician: Junior See DO  PCP: Sarita Neal MD       Plan of care signed (Y/N): Y    Date of Patient follow up with Physician: ?     Plan of Care Report: NO  POC update due: (10 visits /OR AUTH LIMITS, whichever is less)  2024                                             Medical History:  Comorbidities:  Hypertension  Other: hypothyroidism, CAD  Relevant Medical History: vertigo, pericarditis (history of)                                         Precautions/ Contra-indications:           Latex allergy:  NO  Pacemaker:    NO  Contraindications for Manipulation: NA  Date of Surgery: NA  Other:    Red Flags:  None    Suicide Screening:   The patient did not verbalize a primary behavioral concern, suicidal ideation, suicidal intent, or demonstrate suicidal behaviors.    Preferred Language for Healthcare:   [x] English       [] other:    SUBJECTIVE EXAMINATION     Patient stated complaint/comment: Pt reports her post thigh was cramping this morning.  Pt also changed BP medication. Notices some dizziness when sitting up in bed.        Eval :Acute on

## 2024-12-24 ENCOUNTER — APPOINTMENT (OUTPATIENT)
Dept: PHYSICAL THERAPY | Age: 84
End: 2024-12-24
Payer: MEDICARE

## 2024-12-26 ENCOUNTER — APPOINTMENT (OUTPATIENT)
Dept: PHYSICAL THERAPY | Age: 84
End: 2024-12-26
Payer: MEDICARE

## 2024-12-30 ENCOUNTER — HOSPITAL ENCOUNTER (OUTPATIENT)
Dept: PHYSICAL THERAPY | Age: 84
Setting detail: THERAPIES SERIES
Discharge: HOME | End: 2024-12-30
Payer: MEDICARE

## 2024-12-30 PROCEDURE — 97112 NEUROMUSCULAR REEDUCATION: CPT

## 2024-12-30 NOTE — FLOWSHEET NOTE
[facility] - Outpatient Rehabilitation and Therapy [address], [place] [address] office: [phone] fax: [phone]      Physical Therapy: TREATMENT/PROGRESS NOTE   Patient: Rosi Oliver (84 y.o. female)   Examination Date: 2024   :  1940 MRN: [id number]   Visit #: 15   Insurance Allowable Auth Needed   BMN []Yes    [x]No    Insurance: Payor: MEDICARE / Plan: MEDICARE PART A AND B / Product Type: *No Product type* /   Insurance ID: [id number] - (Medicare)  Secondary Insurance (if applicable): [facility]   Treatment Diagnosis:     ICD-10-CM    1. Imbalance  R26.89       2. Deficit of vestibulo-ocular reflex  H81.8X9       3. Benign paroxysmal positional vertigo, unspecified laterality  H81.10          Medical Diagnosis:  Benign paroxysmal positional vertigo, unspecified laterality [H81.10]   Referring Physician: Junior See DO  PCP: Sarita Neal MD       Plan of care signed (Y/N): Y    Date of Patient follow up with Physician: ?     Plan of Care Report: NO  POC update due: (10 visits /OR AUTH LIMITS, whichever is less)  2024                                             Medical History:  Comorbidities:  Hypertension  Other: hypothyroidism, CAD  Relevant Medical History: vertigo, pericarditis (history of)                                         Precautions/ Contra-indications:           Latex allergy:  NO  Pacemaker:    NO  Contraindications for Manipulation: NA  Date of Surgery: NA  Other:    Red Flags:  None    Suicide Screening:   The patient did not verbalize a primary behavioral concern, suicidal ideation, suicidal intent, or demonstrate suicidal behaviors.    Preferred Language for Healthcare:   [x] English       [] other:    SUBJECTIVE EXAMINATION     Patient stated complaint/comment: Patient reports that yesterday and today she has been feeling pretty good.      Pt reports her post thigh was cramping this morning.  Pt also changed BP

## 2025-01-02 ENCOUNTER — HOSPITAL ENCOUNTER (OUTPATIENT)
Dept: PHYSICAL THERAPY | Age: 85
Setting detail: THERAPIES SERIES
Discharge: HOME | End: 2025-01-02
Payer: MEDICARE

## 2025-01-02 PROCEDURE — 97112 NEUROMUSCULAR REEDUCATION: CPT

## 2025-01-02 NOTE — DISCHARGE SUMMARY
[facility] - Outpatient Rehabilitation and Therapy [address], [place] [address] office: [phone] fax: [phone]   Physical Therapy Discharge Summary    Dear Junior See DO   ,    We had the pleasure of treating the following patient for physical therapy services at [facility].  A summary of our findings can be found in the discharge summary below.  If you have any questions or concerns regarding these findings, please do not hesitate to contact me at the office phone number checked above.  Thank you for the referral.         Total Visits: 16     Recommendation:    [] Continue PT x / wk for  weeks.   [] Hold PT, pending MD visit   [x] Discharge to [facility]. Follow up with PT or MD PRN.     Reason for Discharge:   Patient has made marked improvement with vestibular reducing dizziness while improve safety with daily function. She has met all STGs and 3/4 LTGs.  Patient is confident in her daily routine and mobility. She will be discharged at this time.             Physical Therapy: TREATMENT/PROGRESS NOTE   Patient: Rosi Oliver (84 y.o. female)   Examination Date: 2025   :  1940 MRN: [id number]   Visit #: 16   Insurance Allowable Auth Needed   BMN []Yes    [x]No    Insurance: Payor: MEDICARE / Plan: MEDICARE PART A AND B / Product Type: *No Product type* /   Insurance ID: [id number] - (Medicare)  Secondary Insurance (if applicable): [facility]   Treatment Diagnosis:     ICD-10-CM    1. Imbalance  R26.89       2. Deficit of vestibulo-ocular reflex  H81.8X9       3. Benign paroxysmal positional vertigo, unspecified laterality  H81.10          Medical Diagnosis:  Benign paroxysmal positional vertigo, unspecified laterality [H81.10]   Referring Physician: Junior See DO  PCP: Sarita Neal MD       Plan of care signed (Y/N): Y    Date of Patient follow up with Physician: ?     Plan of Care Report: NO  POC update due: (10

## 2025-03-18 ENCOUNTER — TELEPHONE (OUTPATIENT)
Dept: ENT CLINIC | Age: 85
End: 2025-03-18

## 2025-03-18 ENCOUNTER — PATIENT MESSAGE (OUTPATIENT)
Dept: ENT CLINIC | Age: 85
End: 2025-03-18

## 2025-03-18 ENCOUNTER — PROCEDURE VISIT (OUTPATIENT)
Dept: AUDIOLOGY | Age: 85
End: 2025-03-18

## 2025-03-18 DIAGNOSIS — H90.3 SENSORINEURAL HEARING LOSS, BILATERAL: Primary | ICD-10-CM

## 2025-03-18 NOTE — TELEPHONE ENCOUNTER
Called pt and scheduled follow up with Dr. See on 7/1/25 - Message sent to Dr. Garcia for refill of Rx.

## 2025-03-18 NOTE — TELEPHONE ENCOUNTER
Pt requested refill Rx:  Protonix 40 mg tablet - take 1 tablet daily  Last filled on 9/26/2024. 90 tablets with 0 refills.  Last OV 9/26/2024. Next OV scheduled with sEa on 7/1/2025.   Pt currently out of meds.    Please advise. Thank you.

## 2025-03-19 RX ORDER — PANTOPRAZOLE SODIUM 40 MG/1
40 TABLET, DELAYED RELEASE ORAL DAILY
Qty: 90 TABLET | Refills: 0 | Status: SHIPPED | OUTPATIENT
Start: 2025-03-19

## 2025-03-19 ASSESSMENT — PATIENT HEALTH QUESTIONNAIRE - PHQ9
2. FEELING DOWN, DEPRESSED OR HOPELESS: NOT AT ALL
2. FEELING DOWN, DEPRESSED OR HOPELESS: NOT AT ALL
SUM OF ALL RESPONSES TO PHQ QUESTIONS 1-9: 0
1. LITTLE INTEREST OR PLEASURE IN DOING THINGS: NOT AT ALL
1. LITTLE INTEREST OR PLEASURE IN DOING THINGS: NOT AT ALL
SUM OF ALL RESPONSES TO PHQ QUESTIONS 1-9: 0
SUM OF ALL RESPONSES TO PHQ QUESTIONS 1-9: 0
SUM OF ALL RESPONSES TO PHQ9 QUESTIONS 1 & 2: 0
SUM OF ALL RESPONSES TO PHQ QUESTIONS 1-9: 0

## 2025-03-23 SDOH — ECONOMIC STABILITY: FOOD INSECURITY: WITHIN THE PAST 12 MONTHS, THE FOOD YOU BOUGHT JUST DIDN'T LAST AND YOU DIDN'T HAVE MONEY TO GET MORE.: NEVER TRUE

## 2025-03-23 SDOH — ECONOMIC STABILITY: FOOD INSECURITY: WITHIN THE PAST 12 MONTHS, YOU WORRIED THAT YOUR FOOD WOULD RUN OUT BEFORE YOU GOT MONEY TO BUY MORE.: NEVER TRUE

## 2025-03-23 SDOH — ECONOMIC STABILITY: INCOME INSECURITY: IN THE LAST 12 MONTHS, WAS THERE A TIME WHEN YOU WERE NOT ABLE TO PAY THE MORTGAGE OR RENT ON TIME?: NO

## 2025-03-26 ENCOUNTER — OFFICE VISIT (OUTPATIENT)
Dept: FAMILY MEDICINE CLINIC | Age: 85
End: 2025-03-26

## 2025-03-26 VITALS
SYSTOLIC BLOOD PRESSURE: 129 MMHG | BODY MASS INDEX: 30.76 KG/M2 | OXYGEN SATURATION: 97 % | DIASTOLIC BLOOD PRESSURE: 84 MMHG | HEART RATE: 94 BPM | WEIGHT: 182 LBS

## 2025-03-26 DIAGNOSIS — I10 ESSENTIAL HYPERTENSION: Primary | ICD-10-CM

## 2025-03-26 PROBLEM — R06.02 SHORTNESS OF BREATH: Status: RESOLVED | Noted: 2023-04-27 | Resolved: 2025-03-26

## 2025-03-26 SDOH — ECONOMIC STABILITY: FOOD INSECURITY: WITHIN THE PAST 12 MONTHS, YOU WORRIED THAT YOUR FOOD WOULD RUN OUT BEFORE YOU GOT MONEY TO BUY MORE.: NEVER TRUE

## 2025-03-26 SDOH — ECONOMIC STABILITY: FOOD INSECURITY: WITHIN THE PAST 12 MONTHS, THE FOOD YOU BOUGHT JUST DIDN'T LAST AND YOU DIDN'T HAVE MONEY TO GET MORE.: NEVER TRUE

## 2025-03-26 NOTE — PROGRESS NOTES
Rosi Oliver is a 84 y.o. female.    HPI:  Here for blood pressure check  Avalide increased to 300/12.5 daily  Blood pressure much improved    Had trouble with nifedipine 30 mg extended release  Lowered her blood pressure and made her dizzy and light headed, she has not been taking it    Meds reviewed with patient in detail    Meds, vitamins and allergies reviewed with pt    Wt Readings from Last 3 Encounters:   03/26/25 82.6 kg (182 lb)   12/06/24 83.5 kg (184 lb)   11/22/24 83.5 kg (184 lb)       REVIEW OF SYSTEMS:   CONSTITUTIONAL: See history of present illness,   Weight noted, stable  HEENT: No new vision difficulties or ringing in the ears.  New hearing aids in, she likes them  RESPIRATORY: No new SOB, PND, orthopnea or cough.   CARDIOVASCULAR: no CP, palpitations or SOB with exertion  GI: No nausea, vomiting, diarrhea, constipation, abdominal pain or changes in bowel habits.   : No urinary frequency, urgency, incontinence hematuria or dysuria.   SKIN: No cyanosis or skin lesions.   MUSCULOSKELETAL: No new muscle or joint pain.   NEUROLOGICAL: No syncope or TIA-like symptoms.   PSYCHIATRIC: No anxiety, insomnia or depression     Allergies   Allergen Reactions    Ceftin [Cefuroxime Axetil]      Diarrhea    Norvasc [Amlodipine] Swelling     Lower extremity edema       Prior to Visit Medications    Medication Sig Taking? Authorizing Provider   pantoprazole (PROTONIX) 40 MG tablet Take 1 tablet by mouth daily Yes Matt Garcia MD   irbesartan-hydroCHLOROthiazide (AVALIDE) 300-12.5 MG per tablet Take 1 tablet by mouth daily (with breakfast) Yes Sarita Neal MD   NIFEdipine (ADALAT CC) 30 MG extended release tablet Take 1 tablet by mouth nightly  Patient taking differently: Take 15 mg by mouth nightly as needed Yes Sarita Neal MD   Blood Pressure KIT Check BP twice a week Yes Sarita Neal MD   levothyroxine (SYNTHROID) 75 MCG tablet TAKE 1 TABLET DAILY Yes Sarita Neal MD   VEVYE 0.1 % SOLN

## 2025-06-12 ENCOUNTER — TELEPHONE (OUTPATIENT)
Dept: ENT CLINIC | Age: 85
End: 2025-06-12

## 2025-06-12 NOTE — TELEPHONE ENCOUNTER
Pt called in having issues with connecting her hearing aid to her cell phone and wanted to speak to Dr. WALSH. Please call patient on landline at [phone]. Thank you!

## 2025-06-13 NOTE — TELEPHONE ENCOUNTER
This patient is past due for the following medications as of 20250601: METFORMIN (12 days late), IRBESARTAN-HCTZ (12 days late) AND ATORVASTATIN (12 days late). Please evaluate this member's refill history and discuss the importance of medication adherence if appropriate.      Please advise; thank you!    Patient Phone Number: [phone] (home)     Last appt: 3/26/2025   Next appt: 7/25/2025

## 2025-06-14 RX ORDER — METFORMIN HYDROCHLORIDE 500 MG/1
TABLET, EXTENDED RELEASE ORAL
Qty: 180 TABLET | Refills: 3 | Status: SHIPPED | OUTPATIENT
Start: 2025-06-14

## 2025-06-14 RX ORDER — ATORVASTATIN CALCIUM 10 MG/1
10 TABLET, FILM COATED ORAL DAILY
Qty: 90 TABLET | Refills: 3 | Status: SHIPPED | OUTPATIENT
Start: 2025-06-14

## 2025-06-14 RX ORDER — IRBESARTAN AND HYDROCHLOROTHIAZIDE 300; 12.5 MG/1; MG/1
1 TABLET, FILM COATED ORAL
Qty: 90 TABLET | Refills: 3 | Status: SHIPPED | OUTPATIENT
Start: 2025-06-14

## 2025-06-20 ENCOUNTER — OFFICE VISIT (OUTPATIENT)
Dept: FAMILY MEDICINE CLINIC | Age: 85
End: 2025-06-20

## 2025-06-20 VITALS
DIASTOLIC BLOOD PRESSURE: 92 MMHG | SYSTOLIC BLOOD PRESSURE: 155 MMHG | HEART RATE: 88 BPM | BODY MASS INDEX: 31.26 KG/M2 | OXYGEN SATURATION: 95 % | WEIGHT: 185 LBS

## 2025-06-20 DIAGNOSIS — R73.03 PREDIABETES: ICD-10-CM

## 2025-06-20 DIAGNOSIS — R35.0 FREQUENT URINATION: ICD-10-CM

## 2025-06-20 DIAGNOSIS — I10 ESSENTIAL HYPERTENSION: Primary | ICD-10-CM

## 2025-06-20 DIAGNOSIS — M54.42 ACUTE LEFT-SIDED LOW BACK PAIN WITH LEFT-SIDED SCIATICA: ICD-10-CM

## 2025-06-20 DIAGNOSIS — E78.2 MIXED HYPERLIPIDEMIA: ICD-10-CM

## 2025-06-20 DIAGNOSIS — E03.9 ACQUIRED HYPOTHYROIDISM: ICD-10-CM

## 2025-06-20 LAB
ALBUMIN SERPL-MCNC: 4.6 G/DL (ref 3.4–5)
ALBUMIN/GLOB SERPL: 1.7 {RATIO} (ref 1.1–2.2)
ALP SERPL-CCNC: 101 U/L (ref 40–129)
ALT SERPL-CCNC: 14 U/L (ref 10–40)
ANION GAP SERPL CALCULATED.3IONS-SCNC: 13 MMOL/L (ref 3–16)
AST SERPL-CCNC: 18 U/L (ref 15–37)
BASOPHILS # BLD: 0 K/UL (ref 0–0.2)
BASOPHILS NFR BLD: 0.7 %
BILIRUB SERPL-MCNC: 0.7 MG/DL (ref 0–1)
BUN SERPL-MCNC: 24 MG/DL (ref 7–20)
CALCIUM SERPL-MCNC: 10.1 MG/DL (ref 8.3–10.6)
CHLORIDE SERPL-SCNC: 101 MMOL/L (ref 99–110)
CO2 SERPL-SCNC: 26 MMOL/L (ref 21–32)
CREAT SERPL-MCNC: 0.9 MG/DL (ref 0.6–1.2)
DEPRECATED RDW RBC AUTO: 15.4 % (ref 12.4–15.4)
EOSINOPHIL # BLD: 0.1 K/UL (ref 0–0.6)
EOSINOPHIL NFR BLD: 1.3 %
FOLATE SERPL-MCNC: 12.1 NG/ML (ref 4.78–24.2)
GFR SERPLBLD CREATININE-BSD FMLA CKD-EPI: 64 ML/MIN/{1.73_M2}
GLUCOSE SERPL-MCNC: 97 MG/DL (ref 70–99)
HCT VFR BLD AUTO: 42 % (ref 36–48)
HGB BLD-MCNC: 14 G/DL (ref 12–16)
LYMPHOCYTES # BLD: 1.3 K/UL (ref 1–5.1)
LYMPHOCYTES NFR BLD: 22.3 %
MCH RBC QN AUTO: 30.3 PG (ref 26–34)
MCHC RBC AUTO-ENTMCNC: 33.5 G/DL (ref 31–36)
MCV RBC AUTO: 90.7 FL (ref 80–100)
MONOCYTES # BLD: 0.5 K/UL (ref 0–1.3)
MONOCYTES NFR BLD: 8.5 %
NEUTROPHILS # BLD: 4 K/UL (ref 1.7–7.7)
NEUTROPHILS NFR BLD: 67.2 %
PLATELET # BLD AUTO: 338 K/UL (ref 135–450)
PMV BLD AUTO: 7.6 FL (ref 5–10.5)
POTASSIUM SERPL-SCNC: 4.3 MMOL/L (ref 3.5–5.1)
PROT SERPL-MCNC: 7.3 G/DL (ref 6.4–8.2)
RBC # BLD AUTO: 4.63 M/UL (ref 4–5.2)
SODIUM SERPL-SCNC: 140 MMOL/L (ref 136–145)
T4 FREE SERPL-MCNC: 1.4 NG/DL (ref 0.9–1.8)
TSH SERPL DL<=0.005 MIU/L-ACNC: 4 UIU/ML (ref 0.27–4.2)
VIT B12 SERPL-MCNC: 320 PG/ML (ref 211–911)
WBC # BLD AUTO: 6 K/UL (ref 4–11)

## 2025-06-20 NOTE — PROGRESS NOTES
Rosi Oliver is a 84 y.o. female.    HPI:  Complaining of dizziness  Blood pressure elevated since she stopped her nifedipine a couple months ago  Urged her to restart and not come off it    Stop nifedipine about a couple months ago because she thought her blood pressure was okay without it, now BP back up    Left lower back , sharp for 4-5 days worse with certain movements, not keeping her awake  No rash    Urinary frequency, she will try to give a urine today, unable to give urine sample today    Meds, vitamins and allergies reviewed with pt    Wt Readings from Last 3 Encounters:   06/20/25 83.9 kg (185 lb)   03/26/25 82.6 kg (182 lb)   12/06/24 83.5 kg (184 lb)       REVIEW OF SYSTEMS:   CONSTITUTIONAL: See history of present illness,   Weight noted   HEENT: No new vision difficulties or ringing in the ears.   RESPIRATORY: No new SOB, PND, orthopnea or cough.   CARDIOVASCULAR: no CP, palpitations or SOB with exertion  GI: No nausea, vomiting, diarrhea, constipation, abdominal pain or changes in bowel habits.   : No urinary frequency, urgency, incontinence hematuria or dysuria.   SKIN: No cyanosis or skin lesions.   MUSCULOSKELETAL: No new muscle or joint pain.   NEUROLOGICAL: No syncope or TIA-like symptoms.   PSYCHIATRIC: No anxiety, insomnia or depression     Allergies   Allergen Reactions    Ceftin [Cefuroxime Axetil]      Diarrhea    Norvasc [Amlodipine] Swelling     Lower extremity edema       Prior to Visit Medications    Medication Sig Taking? Authorizing Provider   irbesartan-hydroCHLOROthiazide (AVALIDE) 300-12.5 MG per tablet Take 1 tablet by mouth daily (with breakfast) Yes Sarita Neal MD   atorvastatin (LIPITOR) 10 MG tablet Take 1 tablet by mouth daily Yes Sarita Neal MD   metFORMIN (GLUCOPHAGE-XR) 500 MG extended release tablet TAKE 2 TABLETS DAILY WITH SUPPER Yes Sarita Neal MD   pantoprazole (PROTONIX) 40 MG tablet Take 1 tablet by mouth daily Yes Matt Garcia MD   NIFEdipine

## 2025-06-21 ENCOUNTER — RESULTS FOLLOW-UP (OUTPATIENT)
Dept: FAMILY MEDICINE CLINIC | Age: 85
End: 2025-06-21

## 2025-06-21 LAB
EST. AVERAGE GLUCOSE BLD GHB EST-MCNC: 125.5 MG/DL
HBA1C MFR BLD: 6 %

## 2025-06-25 ENCOUNTER — LAB (OUTPATIENT)
Dept: FAMILY MEDICINE CLINIC | Age: 85
End: 2025-06-25
Payer: MEDICARE

## 2025-06-25 ENCOUNTER — RESULTS FOLLOW-UP (OUTPATIENT)
Dept: FAMILY MEDICINE CLINIC | Age: 85
End: 2025-06-25

## 2025-06-25 DIAGNOSIS — R39.9 UTI SYMPTOMS: Primary | ICD-10-CM

## 2025-06-25 LAB
BILIRUBIN, POC: NORMAL
BLOOD URINE, POC: NORMAL
CLARITY, POC: NORMAL
COLOR, POC: YELLOW
GLUCOSE URINE, POC: NORMAL MG/DL
KETONES, POC: NORMAL MG/DL
LEUKOCYTE EST, POC: NORMAL
NITRITE, POC: POSITIVE
PH, POC: 5
PROTEIN, POC: NORMAL MG/DL
SPECIFIC GRAVITY, POC: 1.02
UROBILINOGEN, POC: 0.2 MG/DL

## 2025-06-25 PROCEDURE — 81002 URINALYSIS NONAUTO W/O SCOPE: CPT | Performed by: FAMILY MEDICINE

## 2025-06-26 RX ORDER — CIPROFLOXACIN 500 MG/1
500 TABLET, FILM COATED ORAL 2 TIMES DAILY
Qty: 14 TABLET | Refills: 0 | Status: SHIPPED | OUTPATIENT
Start: 2025-06-26 | End: 2025-07-03

## 2025-06-27 LAB
BACTERIA UR CULT: ABNORMAL
ORGANISM: ABNORMAL

## 2025-07-01 ENCOUNTER — OFFICE VISIT (OUTPATIENT)
Dept: ENT CLINIC | Age: 85
End: 2025-07-01
Payer: MEDICARE

## 2025-07-01 VITALS
TEMPERATURE: 97.5 F | DIASTOLIC BLOOD PRESSURE: 88 MMHG | OXYGEN SATURATION: 94 % | HEIGHT: 65 IN | HEART RATE: 95 BPM | WEIGHT: 178 LBS | SYSTOLIC BLOOD PRESSURE: 144 MMHG | BODY MASS INDEX: 29.66 KG/M2

## 2025-07-01 DIAGNOSIS — H61.21 IMPACTED CERUMEN OF RIGHT EAR: ICD-10-CM

## 2025-07-01 DIAGNOSIS — H81.10 BENIGN PAROXYSMAL POSITIONAL VERTIGO, UNSPECIFIED LATERALITY: Primary | ICD-10-CM

## 2025-07-01 PROCEDURE — 1159F MED LIST DOCD IN RCRD: CPT | Performed by: STUDENT IN AN ORGANIZED HEALTH CARE EDUCATION/TRAINING PROGRAM

## 2025-07-01 PROCEDURE — 1123F ACP DISCUSS/DSCN MKR DOCD: CPT | Performed by: STUDENT IN AN ORGANIZED HEALTH CARE EDUCATION/TRAINING PROGRAM

## 2025-07-01 PROCEDURE — G8399 PT W/DXA RESULTS DOCUMENT: HCPCS | Performed by: STUDENT IN AN ORGANIZED HEALTH CARE EDUCATION/TRAINING PROGRAM

## 2025-07-01 PROCEDURE — 3077F SYST BP >= 140 MM HG: CPT | Performed by: STUDENT IN AN ORGANIZED HEALTH CARE EDUCATION/TRAINING PROGRAM

## 2025-07-01 PROCEDURE — 1036F TOBACCO NON-USER: CPT | Performed by: STUDENT IN AN ORGANIZED HEALTH CARE EDUCATION/TRAINING PROGRAM

## 2025-07-01 PROCEDURE — 69210 REMOVE IMPACTED EAR WAX UNI: CPT | Performed by: STUDENT IN AN ORGANIZED HEALTH CARE EDUCATION/TRAINING PROGRAM

## 2025-07-01 PROCEDURE — 99213 OFFICE O/P EST LOW 20 MIN: CPT | Performed by: STUDENT IN AN ORGANIZED HEALTH CARE EDUCATION/TRAINING PROGRAM

## 2025-07-01 PROCEDURE — 3079F DIAST BP 80-89 MM HG: CPT | Performed by: STUDENT IN AN ORGANIZED HEALTH CARE EDUCATION/TRAINING PROGRAM

## 2025-07-01 PROCEDURE — 1090F PRES/ABSN URINE INCON ASSESS: CPT | Performed by: STUDENT IN AN ORGANIZED HEALTH CARE EDUCATION/TRAINING PROGRAM

## 2025-07-01 PROCEDURE — G8427 DOCREV CUR MEDS BY ELIG CLIN: HCPCS | Performed by: STUDENT IN AN ORGANIZED HEALTH CARE EDUCATION/TRAINING PROGRAM

## 2025-07-01 PROCEDURE — G8417 CALC BMI ABV UP PARAM F/U: HCPCS | Performed by: STUDENT IN AN ORGANIZED HEALTH CARE EDUCATION/TRAINING PROGRAM

## 2025-07-01 NOTE — PROGRESS NOTES
[facility]  DIVISION OF OTOLARYNGOLOGY- HEAD & NECK SURGERY  CLINIC FOLLOW-UP VISIT      Patient Name: Rosi Oliver  Medical Record Number:  [id number]  Primary Care Physician:  Sarita Neal MD    ChiefComplaint     Chief Complaint   Patient presents with    Ear Problem     Ear cleaning, med refill        History of Present Illness     Rosi Oliver is an 84 y.o. female previously seen for BPPV, GERD, b/l SNHL.     Interval History:   She was recently noted by her audiologist to have wax in her right ear.  For the past couple weeks she has had some dizziness that has been coming and going.  She has a history of vertigo that improved after physical therapy.  She has not been doing her home physical therapy exercises recently.  She states that she feels too dizzy to drive and had somebody drive her here today.  Dizziness feels like the room is spinning for couple seconds when she looks up and occasionally she feels like when going to walk she does not know where her foot is going.  She was recently placed on antibiotic for UTI.  Denies otalgia or otorrhea.    Past Medical History     Past Medical History:   Diagnosis Date    CAD (coronary artery disease)     GERD (gastroesophageal reflux disease)     Hypercholesterolemia     Hypertension     Hypothyroidism 2011    Pericarditis     history of    Shortness of breath 04/27/2023    Vertigo        Past Surgical History     Past Surgical History:   Procedure Laterality Date    BRONCHOSCOPY N/A 04/04/2023    BRONCHOSCOPY DIAGNOSTIC OR CELL WASH ONLY performed by Leroy Augustine MD at [facility] END[facility]    CATARACT REMOVAL Bilateral 02/01/2012    EYE SURGERY  Feb 2012 and Feb 2022    cataracts    FRACTURE SURGERY      repair    TONSILLECTOMY         Family History     Family History   Problem Relation Age of Onset    Dementia Mother     High Blood Pressure Mother     High Cholesterol Mother     Stroke Mother         + several TIAs    Cancer Father

## 2025-07-09 ENCOUNTER — HOSPITAL ENCOUNTER (OUTPATIENT)
Dept: PHYSICAL THERAPY | Age: 85
Setting detail: THERAPIES SERIES
Discharge: HOME | End: 2025-07-09
Attending: STUDENT IN AN ORGANIZED HEALTH CARE EDUCATION/TRAINING PROGRAM
Payer: MEDICARE

## 2025-07-09 DIAGNOSIS — R42 DIZZINESS: ICD-10-CM

## 2025-07-09 DIAGNOSIS — H81.12 BPPV (BENIGN PAROXYSMAL POSITIONAL VERTIGO), LEFT: ICD-10-CM

## 2025-07-09 DIAGNOSIS — H83.2X2 VESTIBULAR HYPOFUNCTION OF LEFT EAR: Primary | ICD-10-CM

## 2025-07-09 PROCEDURE — 97112 NEUROMUSCULAR REEDUCATION: CPT

## 2025-07-09 PROCEDURE — 97530 THERAPEUTIC ACTIVITIES: CPT

## 2025-07-09 PROCEDURE — 97161 PT EVAL LOW COMPLEX 20 MIN: CPT

## 2025-07-09 NOTE — PLAN OF CARE
[facility] - Outpatient Rehabilitation and Therapy: [address], [place] [address] office: [phone] fax: [phone]     Physical Therapy Initial Evaluation Certification      Dear Junior See DO,    We had the pleasure of evaluating the following patient for physical therapy services at [facility].  A summary of our findings can be found in the initial assessment below.  This includes our plan of care.  If you have any questions or concerns regarding these findings, please do not hesitate to contact me at the office phone number listed above.  Thank you for the referral.     Physician Signature:_______________________________Date:__________________  By signing above (or electronic signature), therapist’s plan is approved by physician       Physical Therapy: TREATMENT/PROGRESS NOTE   Patient: Rosi Oliver (84 y.o. female)   Examination Date: 2025   :  1940 MRN: [id number]   Visit #: 1   Insurance Allowable Auth Needed   Mn []Yes    [x]No    Insurance: Payor: MEDICARE / Plan: MEDICARE PART A AND B / Product Type: *No Product type* /   Insurance ID: [id number] - (Medicare)  Secondary Insurance (if applicable): [facility]   Treatment Diagnosis:     ICD-10-CM    1. 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 BPPV (benign paroxysmal positional vertigo), left  H81.12       3. Dizziness  R42          Medical Diagnosis:  Benign paroxysmal positional vertigo, unspecified laterality [H81.10]   Referring Physician: Junior See DO  PCP: Sarita Neal MD       Plan of care signed (Y/N):     Date of Patient follow up with Physician: CEDRIC     Plan of Care Report: EVAL today  POC update due: (10 visits /OR AUTH LIMITS, whichever is less)  2025                                             Medical History:  Comorbidities:  Other: CAD, HTN  Relevant Medical History:                                          Precautions/

## 2025-07-14 ENCOUNTER — HOSPITAL ENCOUNTER (OUTPATIENT)
Dept: PHYSICAL THERAPY | Age: 85
Setting detail: THERAPIES SERIES
Discharge: HOME | End: 2025-07-14
Attending: STUDENT IN AN ORGANIZED HEALTH CARE EDUCATION/TRAINING PROGRAM
Payer: MEDICARE

## 2025-07-14 PROCEDURE — 97112 NEUROMUSCULAR REEDUCATION: CPT

## 2025-07-14 PROCEDURE — 95992 CANALITH REPOSITIONING PROC: CPT

## 2025-07-14 NOTE — FLOWSHEET NOTE
[facility] - Outpatient Rehabilitation and Therapy: [address], [place] [address] office: [phone] fax: [phone]     Physical Therapy: TREATMENT/PROGRESS NOTE   Patient: Rosi Oliver (84 y.o. female)   Examination Date: 2025   :  1940 MRN: [id number]   Visit #: 2   Insurance Allowable Auth Needed   Mn []Yes    [x]No    Insurance: Payor: MEDICARE / Plan: MEDICARE PART A AND B / Product Type: *No Product type* /   Insurance ID: [id number] - (Medicare)  Secondary Insurance (if applicable): [facility]   Treatment Diagnosis:     ICD-10-CM    1. 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 BPPV (benign paroxysmal positional vertigo), left  H81.12       3. Dizziness  R42          Medical Diagnosis:  Benign paroxysmal positional vertigo, unspecified laterality [H81.10]   Referring Physician: Junior See DO  PCP: Sarita Neal MD       Plan of care signed (Y/N):     Date of Patient follow up with Physician: CEDRIC     Plan of Care Report: NO  POC update due: (10 visits /OR AUTH LIMITS, whichever is less)  2025                                             Medical History:  Comorbidities:  Other: CAD, HTN  Relevant Medical History:                                          Precautions/ Contra-indications:           Latex allergy:  NO  Pacemaker:    NO  Contraindications for Manipulation: None  Date of Surgery:   Other:    Red Flags:  None    Suicide Screening:   The patient did not verbalize a primary behavioral concern, suicidal ideation, suicidal intent, or demonstrate suicidal behaviors.    Preferred Language for Healthcare:   [x] English       [] other:    SUBJECTIVE EXAMINATION     Patient stated complaint/comment: Patient reports that she has not had any real episodes just drifting with walking       Patient reports that for a month now she's had been feeling off and with mild dizziness. Says she experience dizziness most of the time looking

## 2025-07-18 ENCOUNTER — HOSPITAL ENCOUNTER (OUTPATIENT)
Dept: PHYSICAL THERAPY | Age: 85
Setting detail: THERAPIES SERIES
Discharge: HOME | End: 2025-07-18
Attending: STUDENT IN AN ORGANIZED HEALTH CARE EDUCATION/TRAINING PROGRAM
Payer: MEDICARE

## 2025-07-18 PROCEDURE — 97110 THERAPEUTIC EXERCISES: CPT

## 2025-07-18 PROCEDURE — 97112 NEUROMUSCULAR REEDUCATION: CPT

## 2025-07-18 NOTE — FLOWSHEET NOTE
[facility] - Outpatient Rehabilitation and Therapy: [address], [place] [address] office: [phone] fax: [phone]     Physical Therapy: TREATMENT/PROGRESS NOTE   Patient: Rosi Oliver (84 y.o. female)   Examination Date: 2025   :  1940 MRN: [id number]   Visit #: 3   Insurance Allowable Auth Needed   Mn []Yes    [x]No    Insurance: Payor: MEDICARE / Plan: MEDICARE PART A AND B / Product Type: *No Product type* /   Insurance ID: [id number] - (Medicare)  Secondary Insurance (if applicable): [facility]   Treatment Diagnosis:     ICD-10-CM    1. 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 BPPV (benign paroxysmal positional vertigo), left  H81.12       3. Dizziness  R42          Medical Diagnosis:  Benign paroxysmal positional vertigo, unspecified laterality [H81.10]   Referring Physician: Junior See DO  PCP: Sarita Neal MD       Plan of care signed (Y/N):     Date of Patient follow up with Physician: CEDRIC     Plan of Care Report: NO  POC update due: (10 visits /OR AUTH LIMITS, whichever is less)  2025                                             Medical History:  Comorbidities:  Other: CAD, HTN  Relevant Medical History:                                          Precautions/ Contra-indications:           Latex allergy:  NO  Pacemaker:    NO  Contraindications for Manipulation: None  Date of Surgery:   Other:    Red Flags:  None    Suicide Screening:   The patient did not verbalize a primary behavioral concern, suicidal ideation, suicidal intent, or demonstrate suicidal behaviors.    Preferred Language for Healthcare:   [x] English       [] other:    SUBJECTIVE EXAMINATION     Patient stated complaint/comment:  Patient reports that she continues to have some dizziness . States that she also is having some BP issues being addressed by PCP.     Patient reports that she has not had any real episodes just drifting with walking       Patient

## 2025-07-22 ENCOUNTER — HOSPITAL ENCOUNTER (OUTPATIENT)
Dept: PHYSICAL THERAPY | Age: 85
Setting detail: THERAPIES SERIES
Discharge: HOME | End: 2025-07-22
Attending: STUDENT IN AN ORGANIZED HEALTH CARE EDUCATION/TRAINING PROGRAM
Payer: MEDICARE

## 2025-07-22 PROCEDURE — 95992 CANALITH REPOSITIONING PROC: CPT

## 2025-07-22 PROCEDURE — 97112 NEUROMUSCULAR REEDUCATION: CPT

## 2025-07-22 SDOH — HEALTH STABILITY: PHYSICAL HEALTH: ON AVERAGE, HOW MANY MINUTES DO YOU ENGAGE IN EXERCISE AT THIS LEVEL?: 30 MIN

## 2025-07-22 SDOH — HEALTH STABILITY: PHYSICAL HEALTH: ON AVERAGE, HOW MANY DAYS PER WEEK DO YOU ENGAGE IN MODERATE TO STRENUOUS EXERCISE (LIKE A BRISK WALK)?: 2 DAYS

## 2025-07-22 ASSESSMENT — PATIENT HEALTH QUESTIONNAIRE - PHQ9
1. LITTLE INTEREST OR PLEASURE IN DOING THINGS: NOT AT ALL
SUM OF ALL RESPONSES TO PHQ QUESTIONS 1-9: 0
2. FEELING DOWN, DEPRESSED OR HOPELESS: NOT AT ALL
SUM OF ALL RESPONSES TO PHQ QUESTIONS 1-9: 0

## 2025-07-22 ASSESSMENT — LIFESTYLE VARIABLES
HOW MANY STANDARD DRINKS CONTAINING ALCOHOL DO YOU HAVE ON A TYPICAL DAY: 0
HOW OFTEN DO YOU HAVE SIX OR MORE DRINKS ON ONE OCCASION: 1
HOW OFTEN DO YOU HAVE A DRINK CONTAINING ALCOHOL: MONTHLY OR LESS
HOW MANY STANDARD DRINKS CONTAINING ALCOHOL DO YOU HAVE ON A TYPICAL DAY: PATIENT DOES NOT DRINK
HOW OFTEN DO YOU HAVE A DRINK CONTAINING ALCOHOL: 2

## 2025-07-22 NOTE — FLOWSHEET NOTE
[facility] - Outpatient Rehabilitation and Therapy: [address], [place] [address] office: [phone] fax: [phone]     Physical Therapy: TREATMENT/PROGRESS NOTE   Patient: Rosi Oliver (84 y.o. female)   Examination Date: 2025   :  1940 MRN: [id number]   Visit #: 4   Insurance Allowable Auth Needed   Mn []Yes    [x]No    Insurance: Payor: MEDICARE / Plan: MEDICARE PART A AND B / Product Type: *No Product type* /   Insurance ID: [id number] - (Medicare)  Secondary Insurance (if applicable): [facility]   Treatment Diagnosis:     ICD-10-CM    1. 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 BPPV (benign paroxysmal positional vertigo), left  H81.12       3. Dizziness  R42          Medical Diagnosis:  Benign paroxysmal positional vertigo, unspecified laterality [H81.10]   Referring Physician: Junior See DO  PCP: Sarita Neal MD       Plan of care signed (Y/N):     Date of Patient follow up with Physician: CEDRIC     Plan of Care Report: NO  POC update due: (10 visits /OR AUTH LIMITS, whichever is less)  2025                                             Medical History:  Comorbidities:  Other: CAD, HTN  Relevant Medical History:                                          Precautions/ Contra-indications:           Latex allergy:  NO  Pacemaker:    NO  Contraindications for Manipulation: None  Date of Surgery:   Other:    Red Flags:  None    Suicide Screening:   The patient did not verbalize a primary behavioral concern, suicidal ideation, suicidal intent, or demonstrate suicidal behaviors.    Preferred Language for Healthcare:   [x] English       [] other:    SUBJECTIVE EXAMINATION     Patient stated complaint/comment:  Pt is having some dizziness today, had someone drive her today in case she isn't feeling good afterward.        Test used Initial score  2025   Pain Summary VAS     Functional questionnaire DHI 28    Other: dizziness

## 2025-07-24 ENCOUNTER — APPOINTMENT (OUTPATIENT)
Dept: PHYSICAL THERAPY | Age: 85
End: 2025-07-24
Attending: STUDENT IN AN ORGANIZED HEALTH CARE EDUCATION/TRAINING PROGRAM
Payer: MEDICARE

## 2025-07-25 ENCOUNTER — OFFICE VISIT (OUTPATIENT)
Dept: FAMILY MEDICINE CLINIC | Age: 85
End: 2025-07-25

## 2025-07-25 ENCOUNTER — HOSPITAL ENCOUNTER (OUTPATIENT)
Dept: PHYSICAL THERAPY | Age: 85
Setting detail: THERAPIES SERIES
Discharge: HOME | End: 2025-07-25
Attending: STUDENT IN AN ORGANIZED HEALTH CARE EDUCATION/TRAINING PROGRAM
Payer: MEDICARE

## 2025-07-25 VITALS
WEIGHT: 178 LBS | HEIGHT: 65 IN | BODY MASS INDEX: 29.66 KG/M2 | DIASTOLIC BLOOD PRESSURE: 80 MMHG | OXYGEN SATURATION: 95 % | HEART RATE: 87 BPM | SYSTOLIC BLOOD PRESSURE: 140 MMHG

## 2025-07-25 DIAGNOSIS — R73.03 PREDIABETES: ICD-10-CM

## 2025-07-25 DIAGNOSIS — Z00.00 MEDICARE ANNUAL WELLNESS VISIT, SUBSEQUENT: Primary | ICD-10-CM

## 2025-07-25 DIAGNOSIS — E78.2 MIXED HYPERLIPIDEMIA: ICD-10-CM

## 2025-07-25 DIAGNOSIS — E03.9 ACQUIRED HYPOTHYROIDISM: ICD-10-CM

## 2025-07-25 DIAGNOSIS — I10 ESSENTIAL HYPERTENSION: ICD-10-CM

## 2025-07-25 PROCEDURE — 95992 CANALITH REPOSITIONING PROC: CPT

## 2025-07-25 PROCEDURE — 97112 NEUROMUSCULAR REEDUCATION: CPT

## 2025-07-25 RX ORDER — NIFEDIPINE 30 MG
30 TABLET, EXTENDED RELEASE ORAL NIGHTLY
Qty: 90 TABLET | Refills: 3 | Status: SHIPPED | OUTPATIENT
Start: 2025-07-25

## 2025-07-25 RX ORDER — LEVOTHYROXINE SODIUM 75 UG/1
75 TABLET ORAL DAILY
Qty: 90 TABLET | Refills: 3 | Status: SHIPPED | OUTPATIENT
Start: 2025-07-25

## 2025-07-25 NOTE — FLOWSHEET NOTE
[facility] - Outpatient Rehabilitation and Therapy: [address], [place] [address] office: [phone] fax: [phone]     Physical Therapy: TREATMENT/PROGRESS NOTE   Patient: Rosi Oliver (84 y.o. female)   Examination Date: 2025   :  1940 MRN: [id number]   Visit #: 5   Insurance Allowable Auth Needed   Mn []Yes    [x]No    Insurance: Payor: MEDICARE / Plan: MEDICARE PART A AND B / Product Type: *No Product type* /   Insurance ID: [id number] - (Medicare)  Secondary Insurance (if applicable): [facility]   Treatment Diagnosis:     ICD-10-CM    1. 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 BPPV (benign paroxysmal positional vertigo), left  H81.12       3. Dizziness  R42          Medical Diagnosis:  Benign paroxysmal positional vertigo, unspecified laterality [H81.10]   Referring Physician: Junior See DO  PCP: Sarita Neal MD       Plan of care signed (Y/N):     Date of Patient follow up with Physician: CEDRIC     Plan of Care Report: NO  POC update due: (10 visits /OR AUTH LIMITS, whichever is less)  2025                                             Medical History:  Comorbidities:  Other: CAD, HTN  Relevant Medical History:                                          Precautions/ Contra-indications:           Latex allergy:  NO  Pacemaker:    NO  Contraindications for Manipulation: None  Date of Surgery:   Other:    Red Flags:  None    Suicide Screening:   The patient did not verbalize a primary behavioral concern, suicidal ideation, suicidal intent, or demonstrate suicidal behaviors.    Preferred Language for Healthcare:   [x] English       [] other:    SUBJECTIVE EXAMINATION     Patient stated complaint/comment:  Felt slightly better after last session. Had a friend drive her today again.        Test used Initial score  2025   Pain Summary VAS     Functional questionnaire DHI 28    Other: dizziness  4/10           Associated

## 2025-07-25 NOTE — PROGRESS NOTES
Medicare Annual Wellness Visit    Rosi Oliver is here for Medicare AWV    Assessment & Plan   Medicare annual wellness visit, subsequent  Healthy diet, staying active    COVID-vaccine     Essential hypertension  Pressure fairly stable, continue meds including Avalide 300/12.5 mg daily nifedipine 30 mg extended release in the evening, continue    Prediabetes  Healthy diet and exercise continue metformin 500 mg extended release twice a day    Mixed hyperlipidemia  Stable on Lipitor 10 mg nightly, continue    Acquired hypothyroidism  Stable on levothyroxine 75 mcg each morning, continue    Follow-up in the fall with new PCP       No follow-ups on file.     Subjective   The following acute and/or chronic problems were also addressed today:  Labs from June 2025 reviewed with patient in detail  BP very good at home       Patient's complete Health Risk Assessment and screening values have been reviewed and are found in Flowsheets. The following problems were reviewed today and where indicated follow up appointments were made and/or referrals ordered.    Positive Risk Factor Screenings with Interventions:    Fall Risk:  Do you feel unsteady or are you worried about falling? : (!) yes  2 or more falls in past year?: no  Fall with injury in past year?: (!) yes  Interventions:    Patient declines any further evaluation or treatment             Inactivity:  On average, how many days per week do you engage in moderate to strenuous exercise (like a brisk walk)?: 2 days (!) Abnormal  On average, how many minutes do you engage in exercise at this level?: 30 min  Interventions:  Walk regular     Abnormal BMI (obese):  Body mass index is 30.08 kg/m². (!) Abnormal  Interventions:  Healthy diet and exercise                    Wt Readings from Last 3 Encounters:   07/25/25 80.7 kg (178 lb)   07/01/25 80.7 kg (178 lb)   06/20/25 83.9 kg (185 lb)            Objective   Vitals:    07/25/25 0951 07/25/25 1015   BP: (!) 143/86 (!)

## 2025-07-28 ENCOUNTER — HOSPITAL ENCOUNTER (OUTPATIENT)
Dept: PHYSICAL THERAPY | Age: 85
Setting detail: THERAPIES SERIES
Discharge: HOME | End: 2025-07-28
Attending: STUDENT IN AN ORGANIZED HEALTH CARE EDUCATION/TRAINING PROGRAM
Payer: MEDICARE

## 2025-07-28 PROCEDURE — 97112 NEUROMUSCULAR REEDUCATION: CPT

## 2025-07-28 PROCEDURE — 95992 CANALITH REPOSITIONING PROC: CPT

## 2025-07-28 NOTE — FLOWSHEET NOTE
[facility] - Outpatient Rehabilitation and Therapy: [address], [place] [address] office: [phone] fax: [phone]     Physical Therapy: TREATMENT/PROGRESS NOTE   Patient: Rosi Oliver (84 y.o. female)   Examination Date: 2025   :  1940 MRN: [id number]   Visit #: 6   Insurance Allowable Auth Needed   Mn []Yes    [x]No    Insurance: Payor: MEDICARE / Plan: MEDICARE PART A AND B / Product Type: *No Product type* /   Insurance ID: [id number] - (Medicare)  Secondary Insurance (if applicable): [facility]   Treatment Diagnosis:     ICD-10-CM    1. 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 BPPV (benign paroxysmal positional vertigo), left  H81.12       3. Dizziness  R42          Medical Diagnosis:  Benign paroxysmal positional vertigo, unspecified laterality [H81.10]   Referring Physician: Junior See DO  PCP: Sarita Neal MD       Plan of care signed (Y/N):     Date of Patient follow up with Physician: CEDRIC     Plan of Care Report: NO  POC update due: (10 visits /OR AUTH LIMITS, whichever is less)  2025                                             Medical History:  Comorbidities:  Other: CAD, HTN  Relevant Medical History:                                          Precautions/ Contra-indications:           Latex allergy:  NO  Pacemaker:    NO  Contraindications for Manipulation: None  Date of Surgery:   Other:    Red Flags:  None    Suicide Screening:   The patient did not verbalize a primary behavioral concern, suicidal ideation, suicidal intent, or demonstrate suicidal behaviors.    Preferred Language for Healthcare:   [x] English       [] other:    SUBJECTIVE EXAMINATION     Patient stated complaint/comment: Patient is having slight dizziness at 4/10 today. States a little today vs Friday.      Felt slightly better after last session. Had a friend drive her today again.        Test used Initial score  2025   Pain Summary VAS

## 2025-08-01 ENCOUNTER — HOSPITAL ENCOUNTER (OUTPATIENT)
Dept: PHYSICAL THERAPY | Age: 85
Setting detail: THERAPIES SERIES
Discharge: HOME | End: 2025-08-01
Attending: STUDENT IN AN ORGANIZED HEALTH CARE EDUCATION/TRAINING PROGRAM
Payer: MEDICARE

## 2025-08-01 PROCEDURE — 97110 THERAPEUTIC EXERCISES: CPT

## 2025-08-01 PROCEDURE — 97112 NEUROMUSCULAR REEDUCATION: CPT

## 2025-08-01 NOTE — FLOWSHEET NOTE
[facility] - Outpatient Rehabilitation and Therapy: [address], [place] [address] office: [phone] fax: [phone]     Physical Therapy: TREATMENT/PROGRESS NOTE   Patient: Rosi Oliver (84 y.o. female)   Examination Date: 2025   :  1940 MRN: [id number]   Visit #: 7   Insurance Allowable Auth Needed   Mn []Yes    [x]No    Insurance: Payor: MEDICARE / Plan: MEDICARE PART A AND B / Product Type: *No Product type* /   Insurance ID: [id number] - (Medicare)  Secondary Insurance (if applicable): [facility]   Treatment Diagnosis:     ICD-10-CM    1. 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 BPPV (benign paroxysmal positional vertigo), left  H81.12       3. Dizziness  R42          Medical Diagnosis:  Benign paroxysmal positional vertigo, unspecified laterality [H81.10]   Referring Physician: Junior See DO  PCP: Sarita Neal MD       Plan of care signed (Y/N):     Date of Patient follow up with Physician: CEDRIC     Plan of Care Report: NO  POC update due: (10 visits /OR AUTH LIMITS, whichever is less)  2025                                             Medical History:  Comorbidities:  Other: CAD, HTN  Relevant Medical History:                                          Precautions/ Contra-indications:           Latex allergy:  NO  Pacemaker:    NO  Contraindications for Manipulation: None  Date of Surgery:   Other:    Red Flags:  None    Suicide Screening:   The patient did not verbalize a primary behavioral concern, suicidal ideation, suicidal intent, or demonstrate suicidal behaviors.    Preferred Language for Healthcare:   [x] English       [] other:    SUBJECTIVE EXAMINATION     Patient stated complaint/comment: Patient is having slight dizziness at 4/10 today. States a little today vs Friday.      Felt slightly better after last session. Had a friend drive her today again.        Test used Initial score  2025   Pain Summary VAS

## 2025-08-04 ENCOUNTER — TELEPHONE (OUTPATIENT)
Dept: FAMILY MEDICINE CLINIC | Age: 85
End: 2025-08-04

## 2025-08-04 RX ORDER — ATORVASTATIN CALCIUM 10 MG/1
10 TABLET, FILM COATED ORAL DAILY
Qty: 90 TABLET | Refills: 3 | Status: SHIPPED | OUTPATIENT
Start: 2025-08-04

## 2025-08-04 RX ORDER — METFORMIN HYDROCHLORIDE 500 MG/1
TABLET, EXTENDED RELEASE ORAL
Qty: 180 TABLET | Refills: 3 | Status: SHIPPED | OUTPATIENT
Start: 2025-08-04

## 2025-08-04 RX ORDER — IRBESARTAN AND HYDROCHLOROTHIAZIDE 300; 12.5 MG/1; MG/1
1 TABLET, FILM COATED ORAL
Qty: 90 TABLET | Refills: 3 | Status: SHIPPED | OUTPATIENT
Start: 2025-08-04

## 2025-08-04 RX ORDER — LEVOTHYROXINE SODIUM 75 UG/1
75 TABLET ORAL DAILY
Qty: 90 TABLET | Refills: 3 | Status: SHIPPED | OUTPATIENT
Start: 2025-08-04

## 2025-08-06 ENCOUNTER — HOSPITAL ENCOUNTER (OUTPATIENT)
Dept: PHYSICAL THERAPY | Age: 85
Setting detail: THERAPIES SERIES
Discharge: HOME | End: 2025-08-06
Attending: STUDENT IN AN ORGANIZED HEALTH CARE EDUCATION/TRAINING PROGRAM
Payer: MEDICARE

## 2025-08-06 PROCEDURE — 97112 NEUROMUSCULAR REEDUCATION: CPT

## 2025-08-06 PROCEDURE — 97110 THERAPEUTIC EXERCISES: CPT

## 2025-08-12 ENCOUNTER — HOSPITAL ENCOUNTER (OUTPATIENT)
Dept: PHYSICAL THERAPY | Age: 85
Setting detail: THERAPIES SERIES
Discharge: HOME | End: 2025-08-12
Attending: STUDENT IN AN ORGANIZED HEALTH CARE EDUCATION/TRAINING PROGRAM
Payer: MEDICARE

## 2025-08-12 PROCEDURE — 97112 NEUROMUSCULAR REEDUCATION: CPT

## 2025-08-12 PROCEDURE — 97110 THERAPEUTIC EXERCISES: CPT

## 2025-08-19 ENCOUNTER — HOSPITAL ENCOUNTER (OUTPATIENT)
Dept: PHYSICAL THERAPY | Age: 85
Setting detail: THERAPIES SERIES
Discharge: HOME | End: 2025-08-19
Attending: STUDENT IN AN ORGANIZED HEALTH CARE EDUCATION/TRAINING PROGRAM
Payer: MEDICARE

## 2025-08-19 PROCEDURE — 97112 NEUROMUSCULAR REEDUCATION: CPT

## 2025-08-19 PROCEDURE — 97110 THERAPEUTIC EXERCISES: CPT

## (undated) DEVICE — SPECIMEN TRAP: Brand: ARGYLE

## (undated) DEVICE — CONMED CHANNEL MASTER PULMONARY AND PEDIATRIC CLEANING BRUSH, 160 CM X 2.0 MM: Brand: CONMED

## (undated) DEVICE — SINGLE USE SUCTION VALVE MAJ-209: Brand: SINGLE USE SUCTION VALVE (STERILE)

## (undated) DEVICE — SYRINGE MED 10ML POLYPR LUERSLIP TIP FLAT TOP W/O SFTY DISP

## (undated) DEVICE — AIRWAY PHGEAL SZ 9 9CM PNK AD ORAL FBROPT INTUB PLAS DISP

## (undated) DEVICE — ENDOSCOPIC KIT 6X3/16 FT COLON W/ 1.1 OZ 2 GWN W/O BRSH

## (undated) DEVICE — SINGLE USE BIOPSY VALVE MAJ-210: Brand: SINGLE USE BIOPSY VALVE (STERILE)

## (undated) DEVICE — SYRINGE MED 50ML LUERLOCK TIP

## (undated) DEVICE — GOWN AURORA NONREINF LG: Brand: MEDLINE INDUSTRIES, INC.